# Patient Record
Sex: MALE | Race: WHITE | Employment: OTHER | URBAN - METROPOLITAN AREA
[De-identification: names, ages, dates, MRNs, and addresses within clinical notes are randomized per-mention and may not be internally consistent; named-entity substitution may affect disease eponyms.]

---

## 2017-02-27 ENCOUNTER — OFFICE VISIT (OUTPATIENT)
Dept: OPHTHALMOLOGY | Facility: CLINIC | Age: 67
End: 2017-02-27
Attending: OPHTHALMOLOGY
Payer: COMMERCIAL

## 2017-02-27 DIAGNOSIS — H52.4 PRESBYOPIA: ICD-10-CM

## 2017-02-27 DIAGNOSIS — H52.13 MYOPIC ASTIGMATISM OF BOTH EYES: ICD-10-CM

## 2017-02-27 DIAGNOSIS — H52.203 MYOPIC ASTIGMATISM OF BOTH EYES: ICD-10-CM

## 2017-02-27 DIAGNOSIS — G90.2 HORNER SYNDROME: ICD-10-CM

## 2017-02-27 DIAGNOSIS — H26.9 CATARACTS, BILATERAL: Primary | ICD-10-CM

## 2017-02-27 PROCEDURE — 99214 OFFICE O/P EST MOD 30 MIN: CPT | Mod: ZF

## 2017-02-27 ASSESSMENT — CONF VISUAL FIELD
OD_NORMAL: 1
OS_NORMAL: 1
METHOD: COUNTING FINGERS

## 2017-02-27 ASSESSMENT — EXTERNAL EXAM - LEFT EYE: OS_EXAM: NORMAL

## 2017-02-27 ASSESSMENT — VISUAL ACUITY
OD_CC: 20/20
METHOD: SNELLEN - LINEAR
OD_CC+: -2
OS_CC: 20/20
CORRECTION_TYPE: GLASSES

## 2017-02-27 ASSESSMENT — TONOMETRY
IOP_METHOD: TONOPEN
OD_IOP_MMHG: 16
OS_IOP_MMHG: 14

## 2017-02-27 ASSESSMENT — SLIT LAMP EXAM - LIDS
COMMENTS: UPPER LID PTOSIS
COMMENTS: NORMAL

## 2017-02-27 ASSESSMENT — REFRACTION_WEARINGRX
OS_AXIS: 115
OD_SPHERE: -2.50
OS_SPHERE: -2.00
SPECS_TYPE: SVL
OS_CYLINDER: +0.25
OD_AXIS: 070
OD_CYLINDER: +0.50

## 2017-02-27 ASSESSMENT — CUP TO DISC RATIO
OD_RATIO: 0.5
OS_RATIO: 0.5

## 2017-02-27 ASSESSMENT — EXTERNAL EXAM - RIGHT EYE: OD_EXAM: NORMAL

## 2017-02-27 NOTE — NURSING NOTE
Chief Complaints and History of Present Illnesses   Patient presents with     COMPREHENSIVE EYE EXAM     routine eye exam, Carly syndrome       HPI    Symptoms:     (Comment: Vision is fine BE.)   Floaters (Comment: sometimes LE for last 2 years, unchanged)   No flashes   Glare   Photophobia         Do you have eye pain now?:  No      Comments:  routine eye exam, Carly syndrome. Eye fatigue left eye.    Harinder QUEZADA  12:37 PM02/27/2017

## 2017-02-27 NOTE — MR AVS SNAPSHOT
After Visit Summary   2/27/2017    Ronald Pearson    MRN: [id number]           Patient Information     Date Of Birth          1950        Visit Information        Provider Department      2/27/2017 12:30 PM Kemi Mares MD [facility]        Today's Diagnoses     Cataracts, bilateral    -  1    Carly syndrome        Myopic astigmatism of both eyes        Presbyopia           Follow-ups after your visit        Follow-up notes from your care team     Return in about 1 year (around 2/27/2018).      Who to contact     Please call your clinic at [phone] to:    Ask questions about your health    Make or cancel appointments    Discuss your medicines    Learn about your test results    Speak to your doctor   If you have compliments or concerns about an experience at your clinic, or if you wish to file a complaint, please contact [facility] Patient Relations at [phone] or email us at [email or web address]         Additional Information About Your Visit        MyChart Information     [employer] gives you secure access to your electronic health record. If you see a primary care provider, you can also send messages to your care team and make appointments. If you have questions, please call your primary care clinic.  If you do not have a primary care provider, please call [phone] and they will assist you.      [employer] is an electronic gateway that provides easy, online access to your medical records. With [employer], you can request a clinic appointment, read your test results, renew a prescription or communicate with your care team.     To access your existing account, please contact your [facility] or call [phone] for assistance.        Care EveryWhere ID     This is your Care EveryWhere ID. This could be used by other organizations to access your [place] medical records  [id number]         Blood Pressure from Last 3  Encounters:   07/30/15 150/87   05/27/15 120/78   02/25/15 118/86    Weight from Last 3 Encounters:   07/30/15 74.8 kg (165 lb)   05/27/15 75.3 kg (166 lb)   02/25/15 76.2 kg (168 lb)              Today, you had the following     No orders found for display       Primary Care Provider Office Phone # Fax #    Kerri Braswell TOBIAS Michael [phone]       [facility] [address] [address]        Thank you!     Thank you for choosing EYE CLINIC  for your care. Our goal is always to provide you with excellent care. Hearing back from our patients is one way we can continue to improve our services. Please take a few minutes to complete the written survey that you may receive in the mail after your visit with us. Thank you!             Your Updated Medication List - Protect others around you: Learn how to safely use, store and throw away your medicines at www.disposemymeds.org.          This list is accurate as of: 2/27/17 12:58 PM.  Always use your most recent med list.                   Brand Name Dispense Instructions for use    terbinafine 1 % cream    lamISIL     Apply topically 2 times daily

## 2017-02-27 NOTE — PROGRESS NOTES
HPI  Ronald Pearson is a 66 year old male here for full eye exam. He feels his vision is overall good and stable with current glasses. He is noting some mild increase in glare in his left eye when there is side or back-lighting that is bright. No pain, redness, or discharge. No flashes/floaters.     who has found to have a left internal carotid artery dissection in November of 2014. He states he was diagnosed with mini-strokes related to the dissection, but has no sequelae from these. He is now on anticoagulation, and he states the size of his artery has returned to normal. He has persistent left eyelid droopiness and a small pupil on the left. He notices some problems with glare in the left eye, but overall the pupil and the lid are not bothersome for him.    Assessment & Plan    (H26.9) Cataracts, bilateral  (primary encounter diagnosis)  Comment: May be some early visual significance with glare symptoms, but vision 20/20 OD and OS today.  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 Etiology already known - secondary to his left internal carotid dissection in November of 2014. He was diagnosed with mini-strokes related to the dissection, but has no sequelae from these. He is now on anticoagulation, and per the patient, the size of his artery has returned to normal.  Plan: Discussed that these findings will likely persist over time. If the ptosis becomes bothersome, can consider ptosis repair surgery. He is currently not bothered, so will observe.    (H52.203) Myopic astigmatism of both eyes  (H52.4) Presbyopia  Comment: Good vision with current glasses, Takes glasses off to read  Plan: Observe     -----------------------------------------------------------------------------------    Patient disposition:   Return to clinic in 1 year or sooner as needed.    Teaching statement:  Complete documentation of  historical and exam elements from today's encounter can be found in the full encounter summary report (not reduplicated in this progress note). I personally obtained the chief complaint(s) and history of present illness.  I confirmed and edited as necessary the review of systems, past medical/surgical history, family history, social history, and examination findings as documented by others; and I examined the patient myself. I personally reviewed the relevant tests, images, and reports as documented above.     I formulated and edited as necessary the assessment and plan and discussed the findings and management plan with the patient and family.      Kemi Mares MD  Comprehensive Ophthalmology & Ocular Pathology  Department of Ophthalmology and Visual Neurosciences  [email or web address]  Pager [phone]

## 2017-08-03 ENCOUNTER — MEDICAL CORRESPONDENCE (OUTPATIENT)
Dept: HEALTH INFORMATION MANAGEMENT | Facility: CLINIC | Age: 67
End: 2017-08-03

## 2017-08-03 ENCOUNTER — TRANSFERRED RECORDS (OUTPATIENT)
Dept: HEALTH INFORMATION MANAGEMENT | Facility: CLINIC | Age: 67
End: 2017-08-03

## 2017-08-08 ENCOUNTER — PRE VISIT (OUTPATIENT)
Dept: GASTROENTEROLOGY | Facility: CLINIC | Age: 67
End: 2017-08-08

## 2017-08-08 NOTE — TELEPHONE ENCOUNTER
1.  Date/reason for appt: 8/17/17 8AM - Dysphagia   2.  Referring provider: Dr. Michael  3.  Call to patient (Yes / No - short description): no, pt is referred  4.  Previous care at / records requested from: [facility] -- Need to fax request for records when closer to appt date

## 2017-08-14 ASSESSMENT — ENCOUNTER SYMPTOMS
HEARTBURN: 1
TROUBLE SWALLOWING: 1
BLOATING: 1

## 2017-08-17 ENCOUNTER — OFFICE VISIT (OUTPATIENT)
Dept: GASTROENTEROLOGY | Facility: CLINIC | Age: 67
End: 2017-08-17

## 2017-08-17 VITALS
HEART RATE: 73 BPM | DIASTOLIC BLOOD PRESSURE: 76 MMHG | SYSTOLIC BLOOD PRESSURE: 128 MMHG | OXYGEN SATURATION: 98 % | HEIGHT: 69 IN | BODY MASS INDEX: 25.77 KG/M2 | WEIGHT: 174 LBS

## 2017-08-17 DIAGNOSIS — K21.9 GASTROESOPHAGEAL REFLUX DISEASE, ESOPHAGITIS PRESENCE NOT SPECIFIED: Primary | ICD-10-CM

## 2017-08-17 DIAGNOSIS — I77.71 CAROTID ARTERY DISSECTION (H): ICD-10-CM

## 2017-08-17 DIAGNOSIS — R13.19 ESOPHAGEAL DYSPHAGIA: ICD-10-CM

## 2017-08-17 ASSESSMENT — PAIN SCALES - GENERAL: PAINLEVEL: MILD PAIN (2)

## 2017-08-17 NOTE — NURSING NOTE
"Chief Complaint   Patient presents with     Consult     Dysphagia       Vitals:    08/17/17 0757   BP: 128/76   Pulse: 73   SpO2: 98%   Weight: 78.9 kg (174 lb)   Height: 1.753 m (5' 9\")       Body mass index is 25.7 kg/(m^2).                          "

## 2017-08-17 NOTE — MR AVS SNAPSHOT
After Visit Summary   8/17/2017    Ronald Pearson    MRN: [id number]           Patient Information     Date Of Birth          1950        Visit Information        Provider Department      8/17/2017 8:00 AM Pia Vazquez, APRN CNP [facility]        Today's Diagnoses     Gastroesophageal reflux disease, esophagitis presence not specified    -  1    Esophageal dysphagia        Carotid artery dissection (H)          Care Instructions    1. Continue the ranitidine (Zantac)  Two times daily     2. Follow lifestyle precautions against acid reflux (GERD)    Do not overeat.   Avoid meals and snacks within three or four hours of lying down.   Avoid alcohol and mint. Decrease or quit smoking.   Do not drink carbonation - it IS acid. Decrease or quit caffeine.   If you have nighttime symptoms, elevate the head of the bed    first 3 inches, then 6 to 8 inches.    A foam wedge from the hip may be helpful, if a person lies on their back.    Pillows do NOT work. The entire back must be straight.   Lose weight if you are overweight.    Even ten pounds weight loss can make a difference.  Some people also have specific triggers: tomato, spice, chocolate, citrus/orange juice.    3. Upper GI endoscopy (EGD) at the [facility] ([facility])   [facility]   [address] [address]  [address] - on the north side, in the first driveway after the [address], building on left.    UPPER ENDOSCOPY (also known as EGD- esophageogastroduodenoscopy)    If you do not receive a call to schedule this within two business days, please call Endoscopy .       You will need a  to bring you home from the exam.  You may not take a cab home unless you have an adult with you.    If you have diabetes or are on blood thinners: talk with your doctor at least one week before the exam.  They may want you to change your medicine before  "the test.    This exam looks at the lining of your esophagus, stomach and duodenum (first part of the small intestine).    Do not eat of drink anything for 6-8 hours before your exam.    During this procedure, the doctor will help you to swallow a flexible tube called an endoscope. This endoscope has a small camera that lets the doctor see inside your body. The exam last from 10-20 minutes.    A small IV will be placed in your hand or arm, and medicine will be given to you through this IV to help you relax and reduce pain. They will spray your throat with numbing medicine and ask you to swallow to assist the doctor in passing the tube into your stomach. Also, biopsies may be taken to test in the lab and pictures may be taken of your esophagus, stomach, and duodenum.    You will rest in the recovery room for 30-60 minutes following the exam. Your throat may be numb for a short while and may be sore for a few days.     4. Re loose stool  ? Lactose intolerance? Dairy free two weeks, then add it back in.  Alcohol ? Alcohol free two weeks, add it back in.    For better stool form  Soluble fiber sops up water and gives soft stool and formed stool.    This also supports healthy gut bacteria.    Is a prebiotic that supports the \"pro-biotics\"  Psyllium (Metamucil) is most natural.   Powder - mix and drink immediately, -  or use the capsules or tablets.  or  Wheat dextrin (Benefiber) which has no taste or texture.   Available as powder, capsule, chewable.  When in doubt, return to powder.  If using the fiber discussed in clinic, start as discussed in clinic.  Or: Take the dose on the label.  Increase gradually to 6 two times daily as needed   If it causes distress, start at half the dose and work up to full dose.  If you have no improvement after two weeks, increase the dose and be sure to use it twice daily.  You may feel bloat at the beginning.   Improvement comes gradually.  Continue this regularly for a couple months " before deciding whether it is helpful for you.    Return to GI Clinic to discuss results. Cancel if not needed.     [facility]   For appointments call Suzanne, [phone]  Coordinator  [phone] Teresa or call -  GI Nurse Triage  [phone] for Medical Questions, # 3  Fax results to                       Follow-ups after your visit        Additional Services     GASTROENTEROLOGY ADULT REF PROCEDURE ONLY       Last Lab Result: Creatinine (mg/dL)       Date                     Value                 01/28/2015               0.88             ----------  Body mass index is 25.7 kg/(m^2).     Needed:  No  Language:  English    Patient will be contacted to schedule procedure.     Please be aware that coverage of these services is subject to the terms and limitations of your health insurance plan.  Call member services at your health plan with any benefit or coverage questions.  Any procedures must be performed at a [facility] facility OR coordinated by your clinic's referral office.    Please bring the following with you to your appointment:    (1) Any X-Rays, CTs or MRIs which have been performed.  Contact the facility where they were done to arrange for  prior to your scheduled appointment.    (2) List of current medications   (3) This referral request   (4) Any documents/labs given to you for this referral                  Follow-up notes from your care team     Return in about 2 months (around 10/17/2017).      Who to contact     Please call your clinic at [phone] to:    Ask questions about your health    Make or cancel appointments    Discuss your medicines    Learn about your test results    Speak to your doctor   If you have compliments or concerns about an experience at your clinic, or if you wish to file a complaint, please contact [facility] Patient Relations at [phone] or email us at [email or web address]    "      Additional Information About Your Visit        [employer] Information     [employer] gives you secure access to your electronic health record. If you see a primary care provider, you can also send messages to your care team and make appointments. If you have questions, please call your primary care clinic.  If you do not have a primary care provider, please call [phone] and they will assist you.      [employer] ([employer]) is an electronic gateway that provides easy, online access to your medical records. With [employer]), you can request a clinic appointment, read your test results, renew a prescription or communicate with your care team.     To access your existing account, please contact your [facility] or call [phone] for assistance.        Care EveryWhere ID     This is your Care EveryWhere ID. This could be used by other organizations to access your [place] medical records  [id number]        Your Vitals Were     Pulse Height Pulse Oximetry BMI (Body Mass Index)          73 1.753 m (5' 9\") 98% 25.7 kg/m2         Blood Pressure from Last 3 Encounters:   08/17/17 128/76   07/30/15 150/87   05/27/15 120/78    Weight from Last 3 Encounters:   08/17/17 78.9 kg (174 lb)   07/30/15 74.8 kg (165 lb)   05/27/15 75.3 kg (166 lb)              We Performed the Following     GASTROENTEROLOGY ADULT REF PROCEDURE ONLY        Primary Care Provider Office Phone # Fax #    Kerri Michael, TOBIAS [phone]       [facility] CLINIC [address] [address]        Equal Access to Services     DAHIANA SIMON AH: Hadii aad ku hadasho Soomaali, waaxda luqadaha, qaybta kaalmada adeegyada, waxay alicia chowdary. So [facility] [phone].    ATENCIÓN: Si habla español, tiene a jeffery disposición servicios gratuitos de asistencia lingüística. Llame al [phone].    We comply with applicable federal civil rights laws and [place] laws. We do not discriminate on the basis " of race, color, national origin, age, disability sex, sexual orientation or gender identity.            Thank you!     Thank you for choosing [facility] IBD  for your care. Our goal is always to provide you with excellent care. Hearing back from our patients is one way we can continue to improve our services. Please take a few minutes to complete the written survey that you may receive in the mail after your visit with us. Thank you!             Your Updated Medication List - Protect others around you: Learn how to safely use, store and throw away your medicines at www.disposemymeds.org.          This list is accurate as of: 8/17/17  8:52 AM.  Always use your most recent med list.                   Brand Name Dispense Instructions for use Diagnosis    ranitidine 150 MG tablet    ZANTAC          terbinafine 1 % cream    lamISIL     Apply topically 2 times daily

## 2017-08-17 NOTE — LETTER
"8/17/2017       RE: Ronald Pearson  [address] [facility] [address]     Dear Colleague,    Thank you for referring your patient, Ronald Pearson, to the [facility] at [facility]. Please see a copy of my visit note below.    CHIEF COMPLAINT:  Esophageal dysphagia.      REFERRING PROVIDER:  Kerri Michael CNP.      HISTORY OF PRESENT ILLNESS:  Ronald is a 67-year-old man who, in the last few months, has developed esophageal dysphagia with perception of food slowly going down the esophagus.  He asha with that by eating more slowly and drinking water between bites and still feels it goes slowly.  He has not had full impaction, need to bring food up, or odynophagia.  He has a distant past history of \"noisy swallowing in childhood according to his mother.\"  He has seldom experienced heartburn and taken Tums infrequently.  He has not had past endoscopy or regular use of PPI or H2 blocker.  He began H2 blocker Ranitidine 150 mg twice daily 10 days ago and does not yet note changes.      Ronald has no history of asthma, significant postnasal drip, oral allergy syndrome.  He drinks 1-3 beers each evening.  He has never awakened in the night with choking.  He does not have cough induced drinking liquids.      Ronald has loose stool 1-3 times each morning.  About once a week it is more like diarrhea and he will take a single Pepto-Bismol.  It does not seem especially gassy.  He occasionally has a left lower quadrant gassy feeling, mild discomfort resolved after passing gas or having bowel movements.  He does not have any other abdominal complaints.  Appetite is good.  Energy and appetite stable, no weight loss.      MEDICAL HISTORY:  Reviewed and updated.  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  Initially had diagnosis of " migraine.  Has not had recurrence of that.  Was on clopidogrel about 1 year but then had normal imaging of the internal carotid  and the clopidogrel was stopped. Hyperlipidemia for which he received statin for a while and that was stopped.  Mild rosacea.      SURGICAL HISTORY:  Hernia repair, colonoscopy  with sedation, a few sigmoid diverticula, otherwise normal.  Repeat in 10 years.      FAMILY HISTORY:  Mother  of Parkinsonism.  Father with hypertension, had coronary artery bypass, DM type 2, thyroid disease of hypertrophy for which he had thyroidectomy, no further information known.  No known GI disease, GI cancer, other possible autoimmune disease.      SOCIAL HISTORY:  Ronald is a former smoker, quit in .  Alcohol as beer, 1-3 daily.  Denies drugs.  He plays trombone, occasionally tuba.      REVIEW OF SYSTEMS:  Positive for heartburn, some bloat, hemorrhoids, dysphagia.  Questionnaire reviewed.      OBJECTIVE:  Vital signs reviewed.  Note BMI at 25.7.  Pain in chest mild at 2.  It does not change with exercise, seems likely related to acid reflux.  No recent Cardiology studies found.  A single patient visit note from Primary Care seen from late July.   GENERAL:  Clean, neatly groomed, casually dressed man who shows no distress.   HEENT:  Eyes are clear.  Breathing is calm and grossly normal.  Submandibular nodes are palpable, not enlarged by size criteria, equal.  There is no thyromegaly or nodularity.   BACK:  Nontender to palpation and percussion.   ABDOMEN:  With no unusual tympany, only at the epigastrium at this time.  It is nontender.  Liver margin is palpable at the right, smooth.  There are no other masses or organomegaly appreciated.   EXTREMITIES:  Without edema, cyanosis, clubbing.  Radial pulses are strong and equal and regular.  Left knee DTR is strong with good return, right not obtained.      ASSESSMENT:  A 67-year-old man with esophageal dysphagia, no symptoms of oropharyngeal  dysphagia, only infrequent actual sensation of GERD.  He has a past history of smoking, ongoing history of alcohol intake.  He also plays trombone, which can exacerbate acid reflux though he does not perceive it at that time.  Likely unrelated, he has loose stool 1-3 each morning.  This may relate to dairy ingestion, alcohol ingestion or other.      PLAN:   1.  Continue the Ranitidine 150 mg twice daily.   2.  Upper GI endoscopy with sedation.   3.  Two weeks dairy-free, then add it back in, 2 weeks alcohol free, then add it back in.   4.  Addition of soluable fiber for improved stool form.   5.  Return to GI Clinic for discussion.  Cancel if not needed.      We discussed the assessment and the plan.  We discussed his low possibility of celiac disease and deferred any testing.  Recommendation was to cease daily alcohol and have it perhaps once or twice weekly instead.  Also on exam, he appears to have mild rosacea but anicteric, mild eye changes raises question of higher levels of alcohol, at least at times.      We discussed the assessment and plan.  There were no barriers found to learning.      Total visit 35 minutes with counseling time 20 minutes.      KRISTINE ALEX CNP       D: 2017 09:41   T: 2017 10:14   MT: JASMINA      Name:     KATHERIN TAMAYO   MRN:      [id number]        Account:      [id number]   :      1950           Service Date: 2017      Document: [id number]

## 2017-08-17 NOTE — PATIENT INSTRUCTIONS
1. Continue the ranitidine (Zantac)  Two times daily     2. Follow lifestyle precautions against acid reflux (GERD)    Do not overeat.   Avoid meals and snacks within three or four hours of lying down.   Avoid alcohol and mint. Decrease or quit smoking.   Do not drink carbonation - it IS acid. Decrease or quit caffeine.   If you have nighttime symptoms, elevate the head of the bed    first 3 inches, then 6 to 8 inches.    A foam wedge from the hip may be helpful, if a person lies on their back.    Pillows do NOT work. The entire back must be straight.   Lose weight if you are overweight.    Even ten pounds weight loss can make a difference.  Some people also have specific triggers: tomato, spice, chocolate, citrus/orange juice.    3. Upper GI endoscopy (EGD) at the [facility] ([facility])   [facility]   [address] [place] [address] [address] [place] [address]  1 [address] west of [facility] [address] - on the north side, in the first driveway after the [address]age [address], building on left.    UPPER ENDOSCOPY (also known as EGD- esophageogastroduodenoscopy)    If you do not receive a call to schedule this within two business days, please call Endoscopy .       You will need a  to bring you home from the exam.  You may not take a cab home unless you have an adult with you.    If you have diabetes or are on blood thinners: talk with your doctor at least one week before the exam.  They may want you to change your medicine before the test.    This exam looks at the lining of your esophagus, stomach and duodenum (first part of the small intestine).    Do not eat of drink anything for 6-8 hours before your exam.    During this procedure, the doctor will help you to swallow a flexible tube called an endoscope. This endoscope has a small camera that lets the doctor see inside your body. The exam last from 10-20 minutes.    A small IV will be placed in your hand or arm, and medicine will be given to you  "through this IV to help you relax and reduce pain. They will spray your throat with numbing medicine and ask you to swallow to assist the doctor in passing the tube into your stomach. Also, biopsies may be taken to test in the lab and pictures may be taken of your esophagus, stomach, and duodenum.    You will rest in the recovery room for 30-60 minutes following the exam. Your throat may be numb for a short while and may be sore for a few days.     4. Re loose stool  ? Lactose intolerance? Dairy free two weeks, then add it back in.  Alcohol ? Alcohol free two weeks, add it back in.    For better stool form  Soluble fiber sops up water and gives soft stool and formed stool.    This also supports healthy gut bacteria.    Is a prebiotic that supports the \"pro-biotics\"  Psyllium (Metamucil) is most natural.   Powder - mix and drink immediately, -  or use the capsules or tablets.  or  Wheat dextrin (Benefiber) which has no taste or texture.   Available as powder, capsule, chewable.  When in doubt, return to powder.  If using the fiber discussed in clinic, start as discussed in clinic.  Or: Take the dose on the label.  Increase gradually to 6 two times daily as needed   If it causes distress, start at half the dose and work up to full dose.  If you have no improvement after two weeks, increase the dose and be sure to use it twice daily.  You may feel bloat at the beginning.   Improvement comes gradually.  Continue this regularly for a couple months before deciding whether it is helpful for you.    Return to GI Clinic to discuss results. Cancel if not needed.     Pia Vazquez, [facility]   For appointments call Suzanne, [phone]  Coordinator  [phone] Teresa or call -  GI Nurse Triage  [phone] for Medical Questions, # 3  Fax results to               "

## 2017-08-17 NOTE — PROGRESS NOTES
"CHIEF COMPLAINT:  Esophageal dysphagia.      REFERRING PROVIDER:  Kerri Michael CNP.      HISTORY OF PRESENT ILLNESS:  Ronald is a 67-year-old man who, in the last few months, has developed esophageal dysphagia with perception of food slowly going down the esophagus.  He asha with that by eating more slowly and drinking water between bites and still feels it goes slowly.  He has not had full impaction, need to bring food up, or odynophagia.  He has a distant past history of \"noisy swallowing in childhood according to his mother.\"  He has seldom experienced heartburn and taken Tums infrequently.  He has not had past endoscopy or regular use of PPI or H2 blocker.  He began H2 blocker Ranitidine 150 mg twice daily 10 days ago and does not yet note changes.      Ronald has no history of asthma, significant postnasal drip, oral allergy syndrome.  He drinks 1-3 beers each evening.  He has never awakened in the night with choking.  He does not have cough induced drinking liquids.      Ronald has loose stool 1-3 times each morning.  About once a week it is more like diarrhea and he will take a single Pepto-Bismol.  It does not seem especially gassy.  He occasionally has a left lower quadrant gassy feeling, mild discomfort resolved after passing gas or having bowel movements.  He does not have any other abdominal complaints.  Appetite is good.  Energy and appetite stable, no weight loss.      MEDICAL HISTORY:  Reviewed and updated.  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  Initially had diagnosis of migraine.  Has not had recurrence of that.  Was on clopidogrel about 1 year but then had normal imaging of the internal carotid 2015 and the clopidogrel was stopped. Hyperlipidemia for which he received statin for a while and that was stopped.  Mild rosacea.      SURGICAL HISTORY:  Hernia repair, " colonoscopy  with sedation, a few sigmoid diverticula, otherwise normal.  Repeat in 10 years.      FAMILY HISTORY:  Mother  of Parkinsonism.  Father with hypertension, had coronary artery bypass, DM type 2, thyroid disease of hypertrophy for which he had thyroidectomy, no further information known.  No known GI disease, GI cancer, other possible autoimmune disease.      SOCIAL HISTORY:  Ronald is a former smoker, quit in .  Alcohol as beer, 1-3 daily.  Denies drugs.  He plays trombone, occasionally tuba.      REVIEW OF SYSTEMS:  Positive for heartburn, some bloat, hemorrhoids, dysphagia.  Questionnaire reviewed.      OBJECTIVE:  Vital signs reviewed.  Note BMI at 25.7.  Pain in chest mild at 2.  It does not change with exercise, seems likely related to acid reflux.  No recent Cardiology studies found.  A single patient visit note from Primary Care seen from late July.   GENERAL:  Clean, neatly groomed, casually dressed man who shows no distress.   HEENT:  Eyes are clear.  Breathing is calm and grossly normal.  Submandibular nodes are palpable, not enlarged by size criteria, equal.  There is no thyromegaly or nodularity.   BACK:  Nontender to palpation and percussion.   ABDOMEN:  With no unusual tympany, only at the epigastrium at this time.  It is nontender.  Liver margin is palpable at the right, smooth.  There are no other masses or organomegaly appreciated.   EXTREMITIES:  Without edema, cyanosis, clubbing.  Radial pulses are strong and equal and regular.  Left knee DTR is strong with good return, right not obtained.      ASSESSMENT:  A 67-year-old man with esophageal dysphagia, no symptoms of oropharyngeal dysphagia, only infrequent actual sensation of GERD.  He has a past history of smoking, ongoing history of alcohol intake.  He also plays trombone, which can exacerbate acid reflux though he does not perceive it at that time.  Likely unrelated, he has loose stool 1-3 each morning.  This may relate  to dairy ingestion, alcohol ingestion or other.      PLAN:   1.  Continue the Ranitidine 150 mg twice daily.   2.  Upper GI endoscopy with sedation.   3.  Two weeks dairy-free, then add it back in, 2 weeks alcohol free, then add it back in.   4.  Addition of soluable fiber for improved stool form.   5.  Return to GI Clinic for discussion.  Cancel if not needed.      We discussed the assessment and the plan.  We discussed his low possibility of celiac disease and deferred any testing.  Recommendation was to cease daily alcohol and have it perhaps once or twice weekly instead.  Also on exam, he appears to have mild rosacea but anicteric, mild eye changes raises question of higher levels of alcohol, at least at times.      We discussed the assessment and plan.  There were no barriers found to learning.      Total visit 35 minutes with counseling time 20 minutes.         KRISTINE ALEX CNP             D: 2017 09:41   T: 2017 10:14   MT: JASMINA      Name:     KATHERIN TAMAYO   MRN:      [id number]        Account:      [id number]   :      1950           Service Date: 2017      Document: [id number]

## 2017-08-17 NOTE — PROGRESS NOTES
Answers for HPI/ROS submitted by the patient on 8/14/2017   General Symptoms: No  Skin Symptoms: No  HENT Symptoms: Yes  EYE SYMPTOMS: No  HEART SYMPTOMS: No  LUNG SYMPTOMS: No  INTESTINAL SYMPTOMS: Yes  URINARY SYMPTOMS: Yes  REPRODUCTIVE SYMPTOMS: Yes  SKELETAL SYMPTOMS: No  BLOOD SYMPTOMS: No  NERVOUS SYSTEM SYMPTOMS: No  MENTAL HEALTH SYMPTOMS: No  Trouble swallowing: Yes  Heart burn or indigestion: Yes  Bloating: Yes  Hemorrhoids: Yes  Erectile dysfunction: Yes

## 2017-09-13 ENCOUNTER — TELEPHONE (OUTPATIENT)
Dept: GASTROENTEROLOGY | Facility: OUTPATIENT CENTER | Age: 67
End: 2017-09-13

## 2017-09-20 ENCOUNTER — TRANSFERRED RECORDS (OUTPATIENT)
Dept: HEALTH INFORMATION MANAGEMENT | Facility: CLINIC | Age: 67
End: 2017-09-20

## 2017-09-20 ENCOUNTER — DOCUMENTATION ONLY (OUTPATIENT)
Dept: GASTROENTEROLOGY | Facility: OUTPATIENT CENTER | Age: 67
End: 2017-09-20

## 2017-09-25 LAB — COPATH REPORT: NORMAL

## 2017-09-28 ENCOUNTER — CARE COORDINATION (OUTPATIENT)
Dept: GASTROENTEROLOGY | Facility: CLINIC | Age: 67
End: 2017-09-28

## 2017-09-28 DIAGNOSIS — K22.70 BARRETT'S ESOPHAGUS: Primary | ICD-10-CM

## 2017-09-28 RX ORDER — NICOTINE POLACRILEX 4 MG/1
20 GUM, CHEWING ORAL DAILY
Qty: 90 TABLET | Refills: 3 | Status: SHIPPED | OUTPATIENT
Start: 2017-09-28 | End: 2019-12-10

## 2017-09-28 NOTE — PROGRESS NOTES
Called pt to let him know that Dr. Sharma is swithcing him to omeprazole and to stop ranitidine.  Also order placed to call him to repeat egd in 3 years or sooner if needed. Pt does not have any questions.             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 I wrote him a result note. He needs to switch ranitidine to omeprazole 20 mg daily. Can you help arrange?     Also needs repeat EGD in 3 years     Thanks,   Luis Alberto

## 2018-07-10 ENCOUNTER — MEDICAL CORRESPONDENCE (OUTPATIENT)
Dept: HEALTH INFORMATION MANAGEMENT | Facility: CLINIC | Age: 68
End: 2018-07-10

## 2018-07-10 ENCOUNTER — TRANSFERRED RECORDS (OUTPATIENT)
Dept: HEALTH INFORMATION MANAGEMENT | Facility: CLINIC | Age: 68
End: 2018-07-10

## 2018-07-30 ENCOUNTER — OFFICE VISIT (OUTPATIENT)
Dept: OPHTHALMOLOGY | Facility: CLINIC | Age: 68
End: 2018-07-30
Attending: OPHTHALMOLOGY
Payer: COMMERCIAL

## 2018-07-30 DIAGNOSIS — H52.4 PRESBYOPIA: ICD-10-CM

## 2018-07-30 DIAGNOSIS — H52.13 MYOPIC ASTIGMATISM OF BOTH EYES: ICD-10-CM

## 2018-07-30 DIAGNOSIS — G90.2 HORNER SYNDROME: ICD-10-CM

## 2018-07-30 DIAGNOSIS — H25.13 NUCLEAR SCLEROTIC CATARACT OF BOTH EYES: Primary | ICD-10-CM

## 2018-07-30 DIAGNOSIS — H52.203 MYOPIC ASTIGMATISM OF BOTH EYES: ICD-10-CM

## 2018-07-30 PROCEDURE — 92015 DETERMINE REFRACTIVE STATE: CPT | Mod: ZF

## 2018-07-30 PROCEDURE — G0463 HOSPITAL OUTPT CLINIC VISIT: HCPCS | Mod: ZF

## 2018-07-30 RX ORDER — TAMSULOSIN HYDROCHLORIDE 0.4 MG/1
0.4 CAPSULE ORAL DAILY
COMMUNITY
End: 2021-07-08

## 2018-07-30 ASSESSMENT — VISUAL ACUITY
OS_CC: 20/20
OD_CC: 20/20
METHOD: SNELLEN - LINEAR
CORRECTION_TYPE: GLASSES
OD_CC+: -1

## 2018-07-30 ASSESSMENT — EXTERNAL EXAM - RIGHT EYE: OD_EXAM: NORMAL

## 2018-07-30 ASSESSMENT — EXTERNAL EXAM - LEFT EYE: OS_EXAM: NORMAL

## 2018-07-30 ASSESSMENT — REFRACTION_MANIFEST
OD_CYLINDER: +0.25
OS_CYLINDER: SPHERE
OD_AXIS: 110
OS_SPHERE: -2.00
OD_SPHERE: -2.50

## 2018-07-30 ASSESSMENT — SLIT LAMP EXAM - LIDS
COMMENTS: NORMAL
COMMENTS: UPPER LID PTOSIS

## 2018-07-30 ASSESSMENT — TONOMETRY
OS_IOP_MMHG: 13
IOP_METHOD: TONOPEN
OD_IOP_MMHG: 17

## 2018-07-30 ASSESSMENT — REFRACTION_WEARINGRX
OS_SPHERE: -2.00
OS_AXIS: 115
OS_CYLINDER: +0.25
OD_SPHERE: -2.50
OD_AXIS: 070
OD_CYLINDER: +0.50
SPECS_TYPE: SVL

## 2018-07-30 ASSESSMENT — CUP TO DISC RATIO
OD_RATIO: 0.5
OS_RATIO: 0.5

## 2018-07-30 ASSESSMENT — CONF VISUAL FIELD
OD_NORMAL: 1
OS_NORMAL: 1

## 2018-07-30 NOTE — MR AVS SNAPSHOT
After Visit Summary   7/30/2018    Ronald Pearson    MRN: [id number]           Patient Information     Date Of Birth          1950        Visit Information        Provider Department      7/30/2018 10:00 AM Kemi Mares MD [facility]        Today's Diagnoses     Nuclear sclerotic cataract of both eyes    -  1    Carly syndrome        Myopic astigmatism of both eyes        Presbyopia           Follow-ups after your visit        Follow-up notes from your care team     Return in about 1 year (around 7/30/2019) for BAT testing.      Your next 10 appointments already scheduled     Sep 05, 2018  9:00 AM CDT   (Arrive by 8:45 AM)   New Patient Visit with Lisset Iraheta MD   [facility] ([facility])    [address] [place] [address]  [address] [place] [id number] [phone]              Who to contact     Please call your clinic at [phone] to:    Ask questions about your health    Make or cancel appointments    Discuss your medicines    Learn about your test results    Speak to your doctor            Additional Information About Your Visit        MyChart Information     [facility] gives you secure access to your electronic health record. If you see a primary care provider, you can also send messages to your care team and make appointments. If you have questions, please call your primary care clinic.  If you do not have a primary care provider, please call [phone] and they will assist you.      [facility] is an electronic gateway that provides easy, online access to your medical records. With [facility], you can request a clinic appointment, read your test results, renew a prescription or communicate with your care team.     To access your existing account, please contact your [facility] or call [phone] for assistance.        Care EveryWhere ID     This is your Care EveryWhere ID. This could be used by other organizations  to access your [place] medical records  [id number]         Blood Pressure from Last 3 Encounters:   08/17/17 128/76   07/30/15 150/87   05/27/15 120/78    Weight from Last 3 Encounters:   08/17/17 78.9 kg (174 lb)   07/30/15 74.8 kg (165 lb)   05/27/15 75.3 kg (166 lb)              Today, you had the following     No orders found for display       Primary Care Provider Office Phone # Fax #    Kerri Braswell [facility] Michael [phone]       [facility] [address] [facility] [address]        Equal Access to Services     DAHIANA SIMON : Hadii aad ku hadasho Souriel, waaxda luqadaha, qaybta kaalmada adequangyada, yulia chowdary. So [facility] [phone].    ATENCIÓN: Si habla español, tiene a jeffery disposición servicios gratuitos de asistencia lingüística. [facility] [phone].    We comply with applicable federal civil rights laws and [place] laws. We do not discriminate on the basis of race, color, national origin, age, disability, sex, sexual orientation, or gender identity.            Thank you!     Thank you for choosing EYE CLINIC  for your care. Our goal is always to provide you with excellent care. Hearing back from our patients is one way we can continue to improve our services. Please take a few minutes to complete the written survey that you may receive in the mail after your visit with us. Thank you!             Your Updated Medication List - Protect others around you: Learn how to safely use, store and throw away your medicines at www.disposemymeds.org.          This list is accurate as of 7/30/18 11:31 AM.  Always use your most recent med list.                   Brand Name Dispense Instructions for use Diagnosis    omeprazole 20 MG tablet     90 tablet    Take 1 tablet (20 mg) by mouth daily in am . To be taken at least 30 to 60 minutes prior to eating.    Patterson's esophagus       tamsulosin 0.4 MG capsule    FLOMAX     Take 0.4 mg by mouth daily         terbinafine 1 % cream    lamISIL     Apply topically 2 times daily

## 2018-07-30 NOTE — NURSING NOTE
Chief Complaints and History of Present Illnesses   Patient presents with     Follow Up For     Cataracts, bilateral       HPI    Affected eye(s):  Both   Symptoms:        Duration:  1 year   Frequency:  Constant       Do you have eye pain now?:  No      Comments:  Pt. States that he is doing well.  No change in VA BE.  No flashes or floaters BE.  No c/o comfort BE.  Ariana LEY 10:27 AM July 30, 2018

## 2018-07-30 NOTE — PROGRESS NOTES
HPI  Ronald Pearson is a 68 year old male here for full eye exam. He feels his vision is overall good and stable with current glasses. He is noting some increase in glare in both eyes, but especially the left eye when there is side or back-lighting that is bright. No pain, redness, or discharge. No flashes/floaters.     He has a history of left internal carotid artery dissection in November of 2014. He states he was diagnosed with mini-strokes related to the dissection, but has no sequelae from these. He is now on anticoagulation, and he states the size of his artery has returned to normal. He has persistent left eyelid droopiness and a small pupil on the left.     Assessment & Plan    (H26.9) Cataracts, bilateral  (primary encounter diagnosis)  Comment: May be some early visual significance with glare symptoms, but vision 20/20 OD and OS today.  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 Etiology already known - secondary to his left internal carotid dissection in November of 2014. He was diagnosed with mini-strokes related to the dissection, but has no sequelae from these. He is now on anticoagulation, and per the patient, the size of his artery has returned to normal.  Plan: Discussed that these findings will likely persist over time. If the ptosis becomes bothersome, can consider ptosis repair surgery. He is currently not bothered, so will observe.    (H52.203) Myopic astigmatism of both eyes  (H52.4) Presbyopia  Comment: Good vision with current glasses, Takes glasses off to read  Plan: Observe     -----------------------------------------------------------------------------------    Patient disposition:   Return to clinic in 1 year with BAT testing or sooner as needed.    Teaching statement:  Complete documentation of historical and exam elements from today's encounter can be found in the full  encounter summary report (not reduplicated in this progress note). I personally obtained the chief complaint(s) and history of present illness.  I confirmed and edited as necessary the review of systems, past medical/surgical history, family history, social history, and examination findings as documented by others; and I examined the patient myself. I personally reviewed the relevant tests, images, and reports as documented above.     I formulated and edited as necessary the assessment and plan and discussed the findings and management plan with the patient and family.      Kemi Mares MD  Comprehensive Ophthalmology & Ocular Pathology  Department of Ophthalmology and Visual Neurosciences  [email or web address]  Pager [phone]

## 2018-09-04 NOTE — TELEPHONE ENCOUNTER
FUTURE VISIT INFORMATION:    Date: 9/5/18    Time: 0900    Location:  Cardiology  REFERRAL INFORMATION:    Referring provider:  Kerri Michael NP    Referring providers clinic:  [facility]    Reason for visit/diagnosis:Family hx of heart disease, borderline high BP, appt mario Wiseman at [facility]

## 2018-09-05 ENCOUNTER — OFFICE VISIT (OUTPATIENT)
Dept: CARDIOLOGY | Facility: CLINIC | Age: 68
End: 2018-09-05
Attending: NURSE PRACTITIONER
Payer: COMMERCIAL

## 2018-09-05 ENCOUNTER — PRE VISIT (OUTPATIENT)
Dept: CARDIOLOGY | Facility: CLINIC | Age: 68
End: 2018-09-05

## 2018-09-05 VITALS
WEIGHT: 172.4 LBS | HEART RATE: 72 BPM | DIASTOLIC BLOOD PRESSURE: 85 MMHG | OXYGEN SATURATION: 98 % | HEIGHT: 69 IN | SYSTOLIC BLOOD PRESSURE: 135 MMHG | BODY MASS INDEX: 25.53 KG/M2

## 2018-09-05 DIAGNOSIS — Z86.79 HISTORY OF CAROTID ARTERY DISSECTION: ICD-10-CM

## 2018-09-05 DIAGNOSIS — E78.2 MIXED HYPERLIPIDEMIA: ICD-10-CM

## 2018-09-05 DIAGNOSIS — I10 ESSENTIAL HYPERTENSION: Primary | ICD-10-CM

## 2018-09-05 PROCEDURE — 93010 ELECTROCARDIOGRAM REPORT: CPT | Mod: ZP | Performed by: INTERNAL MEDICINE

## 2018-09-05 PROCEDURE — 93005 ELECTROCARDIOGRAM TRACING: CPT | Mod: ZF

## 2018-09-05 PROCEDURE — 99204 OFFICE O/P NEW MOD 45 MIN: CPT | Mod: 25 | Performed by: INTERNAL MEDICINE

## 2018-09-05 PROCEDURE — G0463 HOSPITAL OUTPT CLINIC VISIT: HCPCS

## 2018-09-05 RX ORDER — ATORVASTATIN CALCIUM 10 MG/1
40 TABLET, FILM COATED ORAL DAILY
Qty: 30 TABLET | Refills: 11 | Status: SHIPPED | OUTPATIENT
Start: 2018-09-05 | End: 2018-09-10

## 2018-09-05 ASSESSMENT — ENCOUNTER SYMPTOMS: MYALGIAS: 1

## 2018-09-05 ASSESSMENT — PAIN SCALES - GENERAL: PAINLEVEL: NO PAIN (0)
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
## 2018-09-05 NOTE — NURSING NOTE
Vitals completed successfully and medication reconciled. EKG done. Ariam Nugent MA  Chief Complaint   Patient presents with     New Patient     67 yo male h/o hyperlipidemia, ICA with underlying HTN present for borderline HTN

## 2018-09-05 NOTE — MR AVS SNAPSHOT
"              After Visit Summary   2018    Ronald Pearson    MRN: [id number]           Patient Information     Date Of Birth          1950        Visit Information        Provider Department      2018 9:00 AM Lisset Iraheta MD [facility]        Today's Diagnoses     Essential hypertension    -  1    Mixed hyperlipidemia        History of carotid artery dissection          Care Instructions    You were seen today in the Cardiovascular Clinic at the [facility].     Cardiology Providers you saw during your visit: Dr. Iraheta     Diagnosis:   Encounter Diagnoses   Name Primary?     Essential hypertension Yes     Mixed hyperlipidemia      History of carotid artery dissection         Results: Discussed with you today EKG/CTA neck & head/Cholesterol from July      Orders:   Orders Placed This Encounter   Procedures     EKG 12-lead, tracing only (Same Day)       Current Medication List  Current Outpatient Prescriptions   Medication Sig Dispense Refill     atorvastatin (LIPITOR) 10 MG tablet Take 4 tablets (40 mg) by mouth daily 30 tablet 11     omeprazole 20 MG tablet Take 1 tablet (20 mg) by mouth daily in am . To be taken at least 30 to 60 minutes prior to eating. 90 tablet 3     tamsulosin (FLOMAX) 0.4 MG capsule Take 0.4 mg by mouth daily       terbinafine (LAMISIL) 1 % cream Apply topically 2 times daily           Medications Discontinued:  There are no discontinued medications.      Recommendations:   1. Follow BP at home  2. Start baby aspirin and cholesterol lowering therapy Atorvastatin 40 mg daily    Follow-up: 1 year with fasting lipid panel plus EKG         Please feel free to call me with any questions or concerns.       Zen López LPN     Questions: [phone].   First press #1 for the [employer] and then press #3 for \"Medical Questions\" to reach us Cardiology Nurses.     [phone].   First press #1 for [employer] and then press #1 "      On Call Cardiologist for after hours or on weekends: [phone]   option #4 and ask to speak to the on-call Cardiologist.          If you need a medication refill please contact your pharmacy.  Please allow 3 business days for your refill to be completed.  ________________________________________________________________________________________________________________________________                Follow-ups after your visit        Additional Services     Follow-Up with Cardiologist                 Future tests that were ordered for you today     Open Future Orders        Priority Expected Expires Ordered    Follow-Up with Cardiologist Routine 9/5/2019 9/6/2019 9/5/2018    EKG 12-lead, tracing only Routine 9/5/2019 9/6/2019 9/5/2018    ALT Routine 9/5/2019 9/6/2019 9/5/2018    AST Routine 9/5/2019 9/6/2019 9/5/2018    Lipid panel reflex to direct LDL Fasting Routine 9/5/2019 9/6/2019 9/5/2018            Who to contact     If you have questions or need follow up information about today's clinic visit or your schedule please contact [facility] directly at [phone].  Normal or non-critical lab and imaging results will be communicated to you by [email or web address], letter or phone within 4 business days after the clinic has received the results. If you do not hear from us within 7 days, please contact the clinic through [email or web address] or phone. If you have a critical or abnormal lab result, we will notify you by phone as soon as possible.  Submit refill requests through [facility] or call your pharmacy and they will forward the refill request to us. Please allow 3 business days for your refill to be completed.          Additional Information About Your Visit        [email or web address] Information     [facility] gives you secure access to your electronic health record. If you see a primary care provider, you can also send messages to your care team and make appointments. If you have questions, please call your primary care clinic.  If  "you do not have a primary care provider, please call [phone] and they will assist you.        Care EveryWhere ID     This is your Care EveryWhere ID. This could be used by other organizations to access your [place] medical records  [id number]        Your Vitals Were     Pulse Height Pulse Oximetry BMI (Body Mass Index)          72 1.753 m (5' 9\") 98% 25.46 kg/m2         Blood Pressure from Last 3 Encounters:   09/05/18 135/85   08/17/17 128/76   07/30/15 150/87    Weight from Last 3 Encounters:   09/05/18 78.2 kg (172 lb 6.4 oz)   08/17/17 78.9 kg (174 lb)   07/30/15 74.8 kg (165 lb)              We Performed the Following     EKG 12-lead, tracing only (Same Day)          Today's Medication Changes          These changes are accurate as of 9/5/18  9:45 AM.  If you have any questions, ask your nurse or doctor.               Start taking these medicines.        Dose/Directions    atorvastatin 10 MG tablet   Commonly known as:  LIPITOR   Used for:  Essential hypertension, Mixed hyperlipidemia, History of carotid artery dissection   Started by:  Lisset Iraheta MD        Dose:  40 mg   Take 4 tablets (40 mg) by mouth daily   Quantity:  30 tablet   Refills:  11            Where to get your medicines      These medications were sent to [place] [address] [place] [facility] [address] [address]     Phone:  [phone]     atorvastatin 10 MG tablet                Primary Care Provider Office Phone # Fax #    Kerri Michaelle Michael [phone]       [facility] [address] [facility] [address]        Equal Access to Services     NEELAM [facility] Center[facility]AN AH: Hadcarli [place], washrutida luqadaha, qaybta kaalmayulia shen. So [facility] [phone].    ATENCIÓN: Si habla español, tiene a ejffery disposición servicios gratuitos de asistencia lingüística. Llame al [phone].    We comply with applicable federal " civil rights laws and [place] laws. We do not discriminate on the basis of race, color, national origin, age, disability, sex, sexual orientation, or gender identity.            Thank you!     Thank you for choosing [facility]  for your care. Our goal is always to provide you with excellent care. Hearing back from our patients is one way we can continue to improve our services. Please take a few minutes to complete the written survey that you may receive in the mail after your visit with us. Thank you!             Your Updated Medication List - Protect others around you: Learn how to safely use, store and throw away your medicines at www.disposemymeds.org.          This list is accurate as of 9/5/18  9:45 AM.  Always use your most recent med list.                   Brand Name Dispense Instructions for use Diagnosis    atorvastatin 10 MG tablet    LIPITOR    30 tablet    Take 4 tablets (40 mg) by mouth daily    Essential hypertension, Mixed hyperlipidemia, History of carotid artery dissection       omeprazole 20 MG tablet     90 tablet    Take 1 tablet (20 mg) by mouth daily in am . To be taken at least 30 to 60 minutes prior to eating.    Patterson's esophagus       tamsulosin 0.4 MG capsule    FLOMAX     Take 0.4 mg by mouth daily        terbinafine 1 % cream    lamISIL     Apply topically 2 times daily

## 2018-09-05 NOTE — PATIENT INSTRUCTIONS
"You were seen today in the Cardiovascular Clinic at the [facility].     Cardiology Providers you saw during your visit: Dr. Iraheta     Diagnosis:   Encounter Diagnoses   Name Primary?     Essential hypertension Yes     Mixed hyperlipidemia      History of carotid artery dissection         Results: Discussed with you today EKG/CTA neck & head/Cholesterol from July      Orders:   Orders Placed This Encounter   Procedures     EKG 12-lead, tracing only (Same Day)       Current Medication List  Current Outpatient Prescriptions   Medication Sig Dispense Refill     atorvastatin (LIPITOR) 10 MG tablet Take 4 tablets (40 mg) by mouth daily 30 tablet 11     omeprazole 20 MG tablet Take 1 tablet (20 mg) by mouth daily in am . To be taken at least 30 to 60 minutes prior to eating. 90 tablet 3     tamsulosin (FLOMAX) 0.4 MG capsule Take 0.4 mg by mouth daily       terbinafine (LAMISIL) 1 % cream Apply topically 2 times daily           Medications Discontinued:  There are no discontinued medications.      Recommendations:   1. Follow BP at home  2. Start baby aspirin and cholesterol lowering therapy Atorvastatin 40 mg daily    Follow-up: 1 year with fasting lipid panel plus EKG         Please feel free to call me with any questions or concerns.       Zen López LPN     Questions: [phone].   First press #1 for the [employer] and then press #3 for \"Medical Questions\" to reach us Cardiology Nurses.     [phone].   First press #1 for the [employer] and then press #1      On Call Cardiologist for after hours or on weekends: [phone]   option #4 and ask to speak to the on-call Cardiologist.          If you need a medication refill please contact your pharmacy.  Please allow 3 business days for your refill to be completed.  ________________________________________________________________________________________________________________________________        "

## 2018-09-06 LAB — INTERPRETATION ECG - MUSE: NORMAL

## 2018-09-10 DIAGNOSIS — E78.2 MIXED HYPERLIPIDEMIA: ICD-10-CM

## 2018-09-10 DIAGNOSIS — I10 ESSENTIAL HYPERTENSION: ICD-10-CM

## 2018-09-10 DIAGNOSIS — Z86.79 HISTORY OF CAROTID ARTERY DISSECTION: ICD-10-CM

## 2018-09-10 NOTE — TELEPHONE ENCOUNTER
Pharmacy calling to ask for script clarification for dosage. Pharmacy asking for a dispense quantity of 120 or one 40mg tablet.

## 2018-09-14 ENCOUNTER — CARE COORDINATION (OUTPATIENT)
Dept: CARDIOLOGY | Facility: CLINIC | Age: 68
End: 2018-09-14

## 2018-09-14 NOTE — PROGRESS NOTES
Received refill request to change 10 mg tablets to 40 mg tablets from pharmacy. LM for patient to confirm that he wants a bigger tablet (40 mg) or keep 10 mg tablets and just keep taking 4 of them.

## 2018-09-17 RX ORDER — ATORVASTATIN CALCIUM 40 MG/1
40 TABLET, FILM COATED ORAL DAILY
Qty: 90 TABLET | Refills: 3 | Status: SHIPPED | OUTPATIENT
Start: 2018-09-17 | End: 2021-07-08

## 2018-09-17 NOTE — PROGRESS NOTES
Patient returned call and advised him that an new updated RX for atorvastatin  40 mg -take 1 tablet by mouth daily was sent to his pharmacy as a 90 day supply refill. Patient states understanding and agrees to call with any questions or concerns.

## 2018-09-17 NOTE — TELEPHONE ENCOUNTER
Pharmacy called on 09/10/2018 to get dosage clarification. Changed tablet dosing from 4- 10 mg (40 mg) tablets to 1- 40 mg tablets.

## 2018-09-17 NOTE — PROGRESS NOTES
LM for patient again to clarify and confirm that he would like to change size of medication for atorvastatin. Spoke with pharmacy and he states that there were no notes for dosing clarification. Pharmacy states that RX was filled yesterday dispensed at Atorvastatin 40 mg tablets 30 tablets. Sent updated RX for Atrovastatin 40 mg by mouth daily.

## 2018-11-13 ENCOUNTER — THERAPY VISIT (OUTPATIENT)
Dept: PHYSICAL THERAPY | Facility: CLINIC | Age: 68
End: 2018-11-13
Payer: COMMERCIAL

## 2018-11-13 DIAGNOSIS — M25.511 CHRONIC RIGHT SHOULDER PAIN: ICD-10-CM

## 2018-11-13 DIAGNOSIS — G89.29 CHRONIC RIGHT SHOULDER PAIN: ICD-10-CM

## 2018-11-13 PROCEDURE — G8985 CARRY GOAL STATUS: HCPCS | Mod: GP | Performed by: PHYSICAL THERAPIST

## 2018-11-13 PROCEDURE — 97161 PT EVAL LOW COMPLEX 20 MIN: CPT | Mod: GP | Performed by: PHYSICAL THERAPIST

## 2018-11-13 PROCEDURE — 97110 THERAPEUTIC EXERCISES: CPT | Mod: GP | Performed by: PHYSICAL THERAPIST

## 2018-11-13 PROCEDURE — G8984 CARRY CURRENT STATUS: HCPCS | Mod: GP | Performed by: PHYSICAL THERAPIST

## 2018-11-13 NOTE — LETTER
[facility]  [address]  [facility] [address]  [phone]    2018    Re: Ronald Pearson   :   1950  MRN:  [id number]   REFERRING PHYSICIAN:   Kerri Michael    [facility]    Date of Initial Evaluation:  2018  Visits:  Rxs Used: 1  Reason for Referral:  Chronic right shoulder pain    EVALUATION SUMMARY    [facility]    Subjective:  Ronald Pearson is a 68 year old male.    Patient s chief complaints: Right shoulder pain.    Condition occurred due to no particular event.  Thinks maybe he may have over-stretched his arm in a stretching class potentially.  Date of Onset: 2018  Location of symptoms is Posterior shoulder, lateral deltoid down the upper arm.  Symptoms other than pain include: None   Quality of pain is dull and achy, sometimes throbbing, occasionally sharp and frequency is intermittent.    Pain dependence on time of day is: thinks it might be worse when he is trying to sleep.   Pain rating is: 3/10, 8/10.    Symptoms are exacerbated by: Aches when he is laying down at night.  Painful to play trombone.  Working on his smart phone.  Symptoms are relieved by:  Movement throughout the day.    Progression of symptoms is that symptoms are:  About the same.  Imaging/Special tests include: None   Previous treatments include: None.   Patient reports that general health is: Good.   Pertinent medical history includes:  History of  shoulder 40 years ago.  History of high blood pressure, acid reflux.  See EMR for full details.  Medical allergies includes: Reglan.   Surgical history includes: None.  Current medications include: Occasional Ibuprofen.  Tamsilosen, atorvastatin, omeprazole.  See EMR for full details.  Current occupation is: Musical instrument repair, playing trombone,   Work status is:  Part time  Primary job tasks include: handling instruments and tools, desk work, sits on sofa with his I-phone a lot  Barriers include: None  Red flags include: None      Re: Ronald Pearson   :   1950    Patient's expectations for therapy include: Less pain with daily activities, work, playing trombone  HPI            Objective:  SHOULDER:  Cervical Screen: Some minor tightness in upper traps bilaterally with cervical motion, WNL otherwise.  Negative spurling's.     Repeated cervical retraction: resulted in less pain with right shoulder flexion and abduction.    Shoulder:   PROM L PROM R AROM L AROM R MMT L MMT R   Flex   WNL WNL (end range pain) 5 5 (slight pain in forearm)   Abd   WNL WNL (pain at about 85 and end range) 5 5 (slight pain in shoulder and forearm)   Full Can     5 5   Empty Can     5 5   IR   T9 T9 (slight tightness at end range) 5 5   ER   40 40 5 4 (moderate pain in lateral shoulder)   Ext/IR           Scapulothoraic Rhythm: Slight winging in right shoulder blade  Palpation: Slight tenderness to anterior shoulder near biceps insertion      Special tests:   L R   Impingement     Neer's  +   Hawkin's-Adam  -   Coracoid Impingement  -   Biceps      Speed's  -   Assessment/Plan:    Patient is a 68 year old male with right side shoulder complaints.    Patient has the following significant findings with corresponding treatment plan.                Diagnosis 1:  Right Shoulder Pain, possible posterior R/C impingement and cervical spine referral     Pain -  hot/cold therapy, manual therapy, self management, education, directional preference exercise and home program  Decreased ROM/flexibility - manual therapy and therapeutic exercise  Decreased strength - therapeutic exercise and therapeutic activities  Decreased function - therapeutic activities  Impaired posture - neuro re-education  Therapy Evaluation Codes:   1) History comprised of:   Personal factors that impact the plan of care:       None.    Comorbidity factors that impact the plan of care are:      None.     Medications impacting care: None.  2) Examination of Body Systems comprised of:   Body structures and functions that impact the plan of care:      Cervical spine and Shoulder.   Activity limitations that impact the plan of care are:      Bathing, Dressing, Lifting, Working, Sleeping and Laying down.  3) Clinical presentation characteristics are:   Stable/Uncomplicated.  4) Decision-Making    Low complexity using standardized patient assessment instrument and/or measureable assessment of functional outcome.  Cumulative Therapy Evaluation is: Low complexity.    Previous and current functional limitations:  (See Goal Flow Sheet for this information)    Short term and Long term goals: (See Goal Flow Sheet for this information)     Communication ability:  Patient appears to be able to clearly communicate and understand verbal and written communication and follow directions correctly.  Treatment Explanation - The following has been discussed with the patient:   RX ordered/plan of care  Anticipated outcomes  Possible risks and side effects  This patient would benefit from PT intervention to resume normal activities.   Rehab potential is good.  Frequency:  1 X week, once daily  Duration:  for 6 weeks  Discharge Plan:  Achieve all LTG.  Independent in home treatment program.  Reach maximal therapeutic benefit.        Thank you for your referral.    INQUIRIES  Therapist: Jordyn Rainey, SCS, Cert. MDT  [facility]  [address] [place] [address] [address] [address]  Phone: [phone]  Fax: [phone]

## 2018-11-13 NOTE — PROGRESS NOTES
[facility] Initial Evaluation  Subjective:  Ronald Pearson is a 68 year old male.    Patient s chief complaints: Right shoulder pain.    Condition occurred due to no particular event.  Thinks maybe he may have over-stretched his arm in a stretching class potentially.  Date of Onset: 5/13/2018  Location of symptoms is Posterior shoulder, lateral deltoid down the upper arm.  Symptoms other than pain include: None   Quality of pain is dull and achy, sometimes throbbing, occasionally sharp and frequency is intermittent.    Pain dependence on time of day is: thinks it might be worse when he is trying to sleep.   Pain rating is: 3/10, 8/10.    Symptoms are exacerbated by: Aches when he is laying down at night.  Painful to play trombone.  Working on his smart phone.  Symptoms are relieved by:  Movement throughout the day.    Progression of symptoms is that symptoms are:  About the same.  Imaging/Special tests include: None   Previous treatments include: None.   Patient reports that general health is: Good.   Pertinent medical history includes:  History of  shoulder 40 years ago.  History of high blood pressure, acid reflux.  See EMR for full details.  Medical allergies includes: Reglan.   Surgical history includes: None.  Current medications include: Occasional Ibuprofen.  Tamsilosen, atorvastatin, omeprazole.  See EMR for full details.  Current occupation is: Musical instrument repair, playing trombone,   Work status is: Part time  Primary job tasks include: handling instruments and tools, desk work, sits on sofa with his I-phone a lot  Barriers include: None  Red flags include: None    Patient's expectations for therapy include: Less pain with daily activities, work, playing trombone  HPI                    Objective:  SHOULDER:    Cervical Screen: Some minor tightness in upper traps bilaterally with cervical motion, WNL otherwise.  Negative spurling's.     Repeated  cervical retraction: resulted in less pain with right shoulder flexion and abduction.    Shoulder:   PROM L PROM R AROM L AROM R MMT L MMT R   Flex   WNL WNL (end range pain) 5 5 (slight pain in forearm)   Abd   WNL WNL (pain at about 85 and end range) 5 5 (slight pain in shoulder and forearm)   Full Can     5 5   Empty Can     5 5   IR   T9 T9 (slight tightness at end range) 5 5   ER   40 40 5 4 (moderate pain in lateral shoulder)   Ext/IR           Scapulothoraic Rhythm: Slight winging in right shoulder blade    Palpation: Slight tenderness to anterior shoulder near biceps insertion      Special tests:   L R   Impingement     Neer's  +   Hawkin's-Adam  -   Coracoid Impingement  -   Biceps      Speed's  -     System    Physical Exam    General     ROS    Assessment/Plan:    Patient is a 68 year old male with right side shoulder complaints.    Patient has the following significant findings with corresponding treatment plan.                Diagnosis 1:  Right Shoulder Pain, possible posterior R/C impingement and cervical spine referral     Pain -  hot/cold therapy, manual therapy, self management, education, directional preference exercise and home program  Decreased ROM/flexibility - manual therapy and therapeutic exercise  Decreased strength - therapeutic exercise and therapeutic activities  Decreased function - therapeutic activities  Impaired posture - neuro re-education    Therapy Evaluation Codes:   1) History comprised of:   Personal factors that impact the plan of care:      None.    Comorbidity factors that impact the plan of care are:      None.     Medications impacting care: None.  2) Examination of Body Systems comprised of:   Body structures and functions that impact the plan of care:      Cervical spine and Shoulder.   Activity limitations that impact the plan of care are:      Bathing, Dressing, Lifting, Working, Sleeping and Laying down.  3) Clinical presentation characteristics  are:   Stable/Uncomplicated.  4) Decision-Making    Low complexity using standardized patient assessment instrument and/or measureable assessment of functional outcome.  Cumulative Therapy Evaluation is: Low complexity.    Previous and current functional limitations:  (See Goal Flow Sheet for this information)    Short term and Long term goals: (See Goal Flow Sheet for this information)     Communication ability:  Patient appears to be able to clearly communicate and understand verbal and written communication and follow directions correctly.  Treatment Explanation - The following has been discussed with the patient:   RX ordered/plan of care  Anticipated outcomes  Possible risks and side effects  This patient would benefit from PT intervention to resume normal activities.   Rehab potential is good.    Frequency:  1 X week, once daily  Duration:  for 6 weeks  Discharge Plan:  Achieve all LTG.  Independent in home treatment program.  Reach maximal therapeutic benefit.    Please refer to the daily flowsheet for treatment today, total treatment time and time spent performing 1:1 timed codes.

## 2018-11-29 ENCOUNTER — THERAPY VISIT (OUTPATIENT)
Dept: PHYSICAL THERAPY | Facility: CLINIC | Age: 68
End: 2018-11-29
Payer: COMMERCIAL

## 2018-11-29 DIAGNOSIS — G89.29 CHRONIC RIGHT SHOULDER PAIN: ICD-10-CM

## 2018-11-29 DIAGNOSIS — M25.511 CHRONIC RIGHT SHOULDER PAIN: ICD-10-CM

## 2018-11-29 PROCEDURE — 97112 NEUROMUSCULAR REEDUCATION: CPT | Mod: GP | Performed by: PHYSICAL THERAPIST

## 2018-11-29 PROCEDURE — 97110 THERAPEUTIC EXERCISES: CPT | Mod: GP | Performed by: PHYSICAL THERAPIST

## 2018-11-29 PROCEDURE — 97140 MANUAL THERAPY 1/> REGIONS: CPT | Mod: GP | Performed by: PHYSICAL THERAPIST

## 2018-12-13 ENCOUNTER — THERAPY VISIT (OUTPATIENT)
Dept: PHYSICAL THERAPY | Facility: CLINIC | Age: 68
End: 2018-12-13
Payer: COMMERCIAL

## 2018-12-13 DIAGNOSIS — M25.511 CHRONIC RIGHT SHOULDER PAIN: ICD-10-CM

## 2018-12-13 DIAGNOSIS — G89.29 CHRONIC RIGHT SHOULDER PAIN: ICD-10-CM

## 2018-12-13 PROCEDURE — 97110 THERAPEUTIC EXERCISES: CPT | Mod: GP | Performed by: PHYSICAL THERAPIST

## 2018-12-13 PROCEDURE — 97140 MANUAL THERAPY 1/> REGIONS: CPT | Mod: GP | Performed by: PHYSICAL THERAPIST

## 2018-12-13 PROCEDURE — 97112 NEUROMUSCULAR REEDUCATION: CPT | Mod: GP | Performed by: PHYSICAL THERAPIST

## 2018-12-26 ENCOUNTER — THERAPY VISIT (OUTPATIENT)
Dept: PHYSICAL THERAPY | Facility: CLINIC | Age: 68
End: 2018-12-26
Payer: COMMERCIAL

## 2018-12-26 DIAGNOSIS — G89.29 CHRONIC RIGHT SHOULDER PAIN: ICD-10-CM

## 2018-12-26 DIAGNOSIS — M25.511 CHRONIC RIGHT SHOULDER PAIN: ICD-10-CM

## 2018-12-26 PROCEDURE — 97112 NEUROMUSCULAR REEDUCATION: CPT | Mod: GP | Performed by: PHYSICAL THERAPIST

## 2018-12-26 PROCEDURE — 97110 THERAPEUTIC EXERCISES: CPT | Mod: GP | Performed by: PHYSICAL THERAPIST

## 2018-12-26 PROCEDURE — 97140 MANUAL THERAPY 1/> REGIONS: CPT | Mod: GP | Performed by: PHYSICAL THERAPIST

## 2019-01-16 ENCOUNTER — THERAPY VISIT (OUTPATIENT)
Dept: PHYSICAL THERAPY | Facility: CLINIC | Age: 69
End: 2019-01-16
Payer: COMMERCIAL

## 2019-01-16 DIAGNOSIS — M25.511 CHRONIC RIGHT SHOULDER PAIN: ICD-10-CM

## 2019-01-16 DIAGNOSIS — G89.29 CHRONIC RIGHT SHOULDER PAIN: ICD-10-CM

## 2019-01-16 PROCEDURE — 97112 NEUROMUSCULAR REEDUCATION: CPT | Mod: GP | Performed by: PHYSICAL THERAPIST

## 2019-01-16 PROCEDURE — 97140 MANUAL THERAPY 1/> REGIONS: CPT | Mod: GP | Performed by: PHYSICAL THERAPIST

## 2019-01-16 PROCEDURE — G8985 CARRY GOAL STATUS: HCPCS | Mod: GP | Performed by: PHYSICAL THERAPIST

## 2019-01-16 PROCEDURE — 97110 THERAPEUTIC EXERCISES: CPT | Mod: GP | Performed by: PHYSICAL THERAPIST

## 2019-01-16 PROCEDURE — G8986 CARRY D/C STATUS: HCPCS | Mod: GP | Performed by: PHYSICAL THERAPIST

## 2019-01-16 NOTE — LETTER
[facility]THLETIC [facility]AP[facility]  [address]  [facility] [address]  [phone]    2019    Re: Ronald Pearson   :   1950  MRN:  [id number]   REFERRING PHYSICIAN:   Kerri Michael    [facility]THLET[facility] PHYSICAL [facility]    Date of Initial Evaluation:  18  Visits:  Rxs Used: 5  Reason for Referral:  Chronic right shoulder pain    DISCHARGE REPORT  Progress reporting period is from 18 to 19 (5 visits).     SUBJECTIVE  Subjective changes noted by patient:  Patient notes pain is minimal now, 0-2/10.  Overall, he's doing better.  Neck is not as stiff.  Shoulder is feeling better.  Lifting is easier.  Had some mild pain during sleeping last night, but has otherwise been feeing good lately.     Current Pain level: (0-2/10).     Initial Pain level: 8/10.   Changes in function:  Yes (See Goal flowsheet attached for changes in current functional level)  Adverse reaction to treatment or activity: None  OBJECTIVE  Changes noted in objective findings:  Yes,   Objective: Cervical AROM: no pain during, min-nil loss.  Shoulder AROM no pain with flex, abd, IR, ER, ext/IR.  5/5 throughout shoulder strength testing, trace pain R shoulder ER otherwise non painful.     ASSESSMENT/PLAN  Updated problem list and treatment plan: Diagnosis 1:  R shoulder rotator cuff tendinopathy    Will transition to self management with HEP for any remaining deficits in pain, strength, function.  STG/LTGs have been met or progress has been made towards goals:  Yes (See Goal flow sheet completed today.)  Assessment of Progress: The patient's condition is improving.  Self Management Plans:  Patient has been instructed in a home treatment program.  I have re-evaluated this patient and find that the nature, scope, duration and intensity of the therapy is appropriate for the medical condition of the patient.  Ronald continues to require the following  intervention to meet STG and LTG's:  PT intervention is no longer required to meet STG/LTG.              Re: Ronald Pearson   :   1950    Recommendations:  This patient is ready to be discharged from therapy and continue their home treatment program.    Thank you for your referral.    INQUIRIES  Therapist: Harlan Maria, PT, DPT, SCS, Cert., MDT   [facility]  [address] [place] [id number]  Phone: [phone]  Fax: [phone]

## 2019-01-16 NOTE — PROGRESS NOTES
Subjective:  HPI                    Objective:  System    Physical Exam    General     ROS    Assessment/Plan:    DISCHARGE REPORT    Progress reporting period is from 11/13/18 to 1/16/19 (5 visits).       SUBJECTIVE  Subjective changes noted by patient:  Patient notes pain is minimal now, 0-2/10.  Overall, he's doing better.  Neck is not as stiff.  Shoulder is feeling better.  Lifting is easier.  Had some mild pain during sleeping last night, but has otherwise been feeing good lately.     Current Pain level: (0-2/10).     Initial Pain level: 8/10.   Changes in function:  Yes (See Goal flowsheet attached for changes in current functional level)  Adverse reaction to treatment or activity: None    OBJECTIVE  Changes noted in objective findings:  Yes,   Objective: Cervical AROM: no pain during, min-nil loss.  Shoulder AROM no pain with flex, abd, IR, ER, ext/IR.  5/5 throughout shoulder strength testing, trace pain R shoulder ER otherwise non painful.       ASSESSMENT/PLAN  Updated problem list and treatment plan: Diagnosis 1:  R shoulder rotator cuff tendinopathy    Will transition to self management with HEP for any remaining deficits in pain, strength, function.  STG/LTGs have been met or progress has been made towards goals:  Yes (See Goal flow sheet completed today.)  Assessment of Progress: The patient's condition is improving.  Self Management Plans:  Patient has been instructed in a home treatment program.  I have re-evaluated this patient and find that the nature, scope, duration and intensity of the therapy is appropriate for the medical condition of the patient.  Ronald continues to require the following intervention to meet STG and LTG's:  PT intervention is no longer required to meet STG/LTG.    Recommendations:  This patient is ready to be discharged from therapy and continue their home treatment program.    Please refer to the daily flowsheet for treatment today, total treatment time and time spent  performing 1:1 timed codes.

## 2019-10-04 ENCOUNTER — HEALTH MAINTENANCE LETTER (OUTPATIENT)
Age: 69
End: 2019-10-04

## 2019-11-05 ENCOUNTER — TELEPHONE (OUTPATIENT)
Dept: GASTROENTEROLOGY | Facility: CLINIC | Age: 69
End: 2019-11-05

## 2019-11-05 NOTE — TELEPHONE ENCOUNTER
[facility] Call Center    Phone Message    May a detailed message be left on voicemail: yes    Reason for Call: Other: Previous patient of Alyson Vazquez calling to schedule a follow up for thomas's esophagus, writer unable to get an appointment within a month. please call to discuss scheduling. thank you.      Action Taken: Message routed to:  [facility] gastro

## 2019-11-06 NOTE — TELEPHONE ENCOUNTER
Returned pt call, reached pt and scheduled appointment to see Dr. Mccarty. Pt did see past provider Pia Vazquez in 2017, at that time an EGD with Dr. Sharma revealed Barretts. Pt hasn't been to clinic since 2017 EGD. Per pt report, no severe s/s, but GERD persists and has become more noticeable in the past 2 months.

## 2019-11-07 NOTE — TELEPHONE ENCOUNTER
RECORDS RECEIVED FROM: Internal    DATE RECEIVED: 12/10/19    NOTES STATUS DETAILS   OFFICE NOTE from referring provider N/A Self referral    OFFICE NOTE from other specialist Internal Previous pt of Born APRN CNP    DISCHARGE SUMMARY from hospital N/A    OPERATIVE REPORT N/A    MEDICATION LIST N/A         ENDOSCOPY  Received 9/20/17   COLONOSCOPY Received 9/3/14   ERCP N/A    EUS N/A    STOOL TESTING N/A    PERTINENT LABS Internal    PATHOLOGY REPORTS (RELATED) Internal 9/20/17   IMAGING (CT, MRI, EGD) N/A      REFERRAL INFORMATION    Date referral was placed: 12/10/19   Date all records received: N/A   Date records were scanned into Epic: N/A   Date records were sent to Provider to review: N/A   Date and recommendation received from provider:  LETTER SENT  SCHEDULE APPOINTMENT   Date patient was contacted to schedule: 11/6/19

## 2019-12-06 ENCOUNTER — TELEPHONE (OUTPATIENT)
Dept: GASTROENTEROLOGY | Facility: CLINIC | Age: 69
End: 2019-12-06

## 2019-12-06 NOTE — TELEPHONE ENCOUNTER
Spoke to patient reminding of appointment scheduled on 12/10/19 at 0920 with [facility]. Patient to arrive 15 min early. To reschedule or cancel patient to call [phone].      YASEMIN Rodriguez

## 2019-12-10 ENCOUNTER — OFFICE VISIT (OUTPATIENT)
Dept: GASTROENTEROLOGY | Facility: CLINIC | Age: 69
End: 2019-12-10
Payer: COMMERCIAL

## 2019-12-10 ENCOUNTER — PRE VISIT (OUTPATIENT)
Dept: GASTROENTEROLOGY | Facility: CLINIC | Age: 69
End: 2019-12-10

## 2019-12-10 VITALS
WEIGHT: 176.2 LBS | DIASTOLIC BLOOD PRESSURE: 85 MMHG | BODY MASS INDEX: 26.1 KG/M2 | HEART RATE: 67 BPM | OXYGEN SATURATION: 99 % | HEIGHT: 69 IN | SYSTOLIC BLOOD PRESSURE: 143 MMHG

## 2019-12-10 DIAGNOSIS — K22.2 SCHATZKI'S RING: Primary | ICD-10-CM

## 2019-12-10 DIAGNOSIS — K22.89 MUCOSAL ABNORMALITY OF ESOPHAGUS: ICD-10-CM

## 2019-12-10 RX ORDER — NICOTINE POLACRILEX 4 MG/1
20 GUM, CHEWING ORAL DAILY
Qty: 90 TABLET | Refills: 3 | Status: SHIPPED | OUTPATIENT
Start: 2019-12-10 | End: 2021-07-08

## 2019-12-10 ASSESSMENT — PAIN SCALES - GENERAL: PAINLEVEL: NO PAIN (0)

## 2019-12-10 ASSESSMENT — MIFFLIN-ST. JEOR: SCORE: 1554.62
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
## 2019-12-10 NOTE — PATIENT INSTRUCTIONS
It was a pleasure taking care of you today.  I've included a brief summary of our discussion and care plan from today's visit below.  Please review this information with your primary care provider.  _______________________________________________________________________    My recommendations are summarized as follows:  1. Continue omeprazole 20mg once per day.   2. Schedule appointment for EGD in ~1 year- we will evaluate for Schatzki's ring and possible Patterson's esophagus.    Please call our nurse Nadira with any questions or concerns- [phone].  --    Return to GI Clinic as needed to review your progress.    _______________________________________________________________________    Who do I call with any questions after my visit?  Please be in touch if there are any further questions that arise following today's visit.  There are multiple ways to contact your gastroenterology care team.        During business hours, you may reach a Gastroenterology nurse at [phone] and choose option 3.         To schedule or reschedule an appointment, please call [phone].       You can always send a secure message through [employer].  [employer] messages are answered by your nurse or doctor typically within 24 hours.  Please allow extra time on weekends and holidays.        For urgent/emergent questions after business hours, you may reach the on-call GI Fellow by contacting the [facility] at [phone].     How will I get the results of any tests ordered?    You will receive all of your results.  If you have signed up for [employer], any tests ordered at your visit will be available to you after your physician reviews them.  Typically this takes 1-2 weeks.  If there are urgent results that require a change in your care plan, your physician or nurse will call you to discuss the next steps.      What is [employer]?  Invajo is a secure way for you to access all of your healthcare records from the [place] [facility]  [place].  It is a web based computer program, so you can sign on to it from any location.  It also allows you to send secure messages to your care team.  I recommend signing up for [employer] access if you have not already done so and are comfortable with using a computer.      How to I schedule a follow-up visit?  If you did not schedule a follow-up visit today, please call [phone] to schedule a follow-up office visit.        Sincerely,    Kenton Mccarty MD     [facility]  Division of Gastroenterology

## 2019-12-10 NOTE — NURSING NOTE
"Chief Complaint   Patient presents with     New Patient     new esophageal       Vitals:    12/10/19 0925   BP: (!) 143/85   Pulse: 67   SpO2: 99%   Weight: 79.9 kg (176 lb 3.2 oz)   Height: 1.753 m (5' 9\")       Body mass index is 26.02 kg/m .    Teresa Carrero CMA    "

## 2020-01-06 ENCOUNTER — OFFICE VISIT (OUTPATIENT)
Dept: FAMILY MEDICINE | Facility: CLINIC | Age: 70
End: 2020-01-06
Payer: COMMERCIAL

## 2020-01-06 VITALS
BODY MASS INDEX: 27.13 KG/M2 | WEIGHT: 179 LBS | OXYGEN SATURATION: 97 % | HEIGHT: 68 IN | RESPIRATION RATE: 16 BRPM | DIASTOLIC BLOOD PRESSURE: 91 MMHG | HEART RATE: 86 BPM | TEMPERATURE: 97.7 F | SYSTOLIC BLOOD PRESSURE: 156 MMHG

## 2020-01-06 DIAGNOSIS — M54.42 ACUTE BILATERAL LOW BACK PAIN WITH LEFT-SIDED SCIATICA: ICD-10-CM

## 2020-01-06 DIAGNOSIS — R07.0 THROAT PAIN: Primary | ICD-10-CM

## 2020-01-06 DIAGNOSIS — R09.82 POST-NASAL DRIP: ICD-10-CM

## 2020-01-06 LAB — S PYO AG THROAT QL IA.RAPID: NEGATIVE

## 2020-01-06 RX ORDER — SILDENAFIL 50 MG/1
50 TABLET, FILM COATED ORAL DAILY PRN
COMMUNITY
End: 2021-12-28

## 2020-01-06 RX ORDER — FLUTICASONE PROPIONATE 50 MCG
1-2 SPRAY, SUSPENSION (ML) NASAL DAILY
Qty: 9.9 ML | Refills: 1 | Status: SHIPPED | OUTPATIENT
Start: 2020-01-06 | End: 2021-07-08

## 2020-01-06 SDOH — HEALTH STABILITY: MENTAL HEALTH: HOW MANY STANDARD DRINKS CONTAINING ALCOHOL DO YOU HAVE ON A TYPICAL DAY?: 1 OR 2

## 2020-01-06 SDOH — HEALTH STABILITY: MENTAL HEALTH: HOW OFTEN DO YOU HAVE A DRINK CONTAINING ALCOHOL?: 2-3 TIMES A WEEK

## 2020-01-06 SDOH — HEALTH STABILITY: MENTAL HEALTH: HOW OFTEN DO YOU HAVE 6 OR MORE DRINKS ON ONE OCCASION?: NEVER

## 2020-01-06 ASSESSMENT — ENCOUNTER SYMPTOMS
VOICE CHANGE: 0
UNEXPECTED WEIGHT CHANGE: 0
RHINORRHEA: 1
FATIGUE: 1
SORE THROAT: 1
TROUBLE SWALLOWING: 0
SINUS PRESSURE: 0
APPETITE CHANGE: 0
FEVER: 0
CHILLS: 0
SINUS PAIN: 0
COUGH: 1

## 2020-01-06 ASSESSMENT — PATIENT HEALTH QUESTIONNAIRE - PHQ9
SUM OF ALL RESPONSES TO PHQ QUESTIONS 1-9: 3
5. POOR APPETITE OR OVEREATING: NOT AT ALL

## 2020-01-06 ASSESSMENT — ANXIETY QUESTIONNAIRES
2. NOT BEING ABLE TO STOP OR CONTROL WORRYING: NOT AT ALL
3. WORRYING TOO MUCH ABOUT DIFFERENT THINGS: SEVERAL DAYS
6. BECOMING EASILY ANNOYED OR IRRITABLE: NOT AT ALL
5. BEING SO RESTLESS THAT IT IS HARD TO SIT STILL: NOT AT ALL
GAD7 TOTAL SCORE: 2
IF YOU CHECKED OFF ANY PROBLEMS ON THIS QUESTIONNAIRE, HOW DIFFICULT HAVE THESE PROBLEMS MADE IT FOR YOU TO DO YOUR WORK, TAKE CARE OF THINGS AT HOME, OR GET ALONG WITH OTHER PEOPLE: SOMEWHAT DIFFICULT
1. FEELING NERVOUS, ANXIOUS, OR ON EDGE: SEVERAL DAYS
7. FEELING AFRAID AS IF SOMETHING AWFUL MIGHT HAPPEN: NOT AT ALL

## 2020-01-06 ASSESSMENT — MIFFLIN-ST. JEOR: SCORE: 1557.79

## 2020-01-06 NOTE — NURSING NOTE
"69 year old  Chief Complaint   Patient presents with     Pharyngitis     Pt. presents to the clinic today with a sore throat x 1 month and back pain from shoveling X 2 weeks.        Blood pressure (!) 156/91, pulse 86, temperature 97.7  F (36.5  C), temperature source Oral, resp. rate 16, height 1.737 m (5' 8.4\"), weight 81.2 kg (179 lb), SpO2 97 %. Body mass index is 26.9 kg/m .  BP completed using cuff size:    Patient Active Problem List   Diagnosis     Carotid artery dissection (H)     History of TIA (transient ischemic attack) and stroke     Carly syndrome     Stroke (H)       Wt Readings from Last 2 Encounters:   20 81.2 kg (179 lb)   12/10/19 79.9 kg (176 lb 3.2 oz)     BP Readings from Last 3 Encounters:   20 (!) 156/91   12/10/19 (!) 143/85   18 135/85       Allergies   Allergen Reactions     Reglan [Metoclopramide] Other (See Comments)     [\"Given for diagnosis of migraine, later disproved\"]   Dystonic reaction. Resolved with benadryl        Current Outpatient Medications   Medication     atorvastatin (LIPITOR) 40 MG tablet     omeprazole 20 MG tablet     sildenafil (VIAGRA) 50 MG tablet     tamsulosin (FLOMAX) 0.4 MG capsule     terbinafine (LAMISIL) 1 % cream     No current facility-administered medications for this visit.        Social History     Tobacco Use     Smoking status: Former Smoker     Last attempt to quit: 1983     Years since quittin.9     Smokeless tobacco: Never Used   Substance Use Topics     Alcohol use: Yes     Comment: beer 2 daily     Drug use: No       Honoring Choices - Health Care Directive Guide offered to patient at time of visit.    Health Maintenance Due   Topic Date Due     HEPATITIS C SCREENING  1950     ADVANCE CARE PLANNING  1950     ZOSTER IMMUNIZATION (1 of 2) 2000     PNEUMOCOCCAL IMMUNIZATION 65+ LOW/MEDIUM RISK (1 of 2 - PCV13) 2015     MEDICARE ANNUAL WELLNESS VISIT  2016     INFLUENZA VACCINE (1) 2019 "     LIPID  11/19/2019     PHQ-2  01/01/2020       Immunization History   Administered Date(s) Administered     Tdap (Adacel,Boostrix) 04/15/2014       No results found for: PAP      Recent Labs   Lab Test 01/28/15  0710 11/19/14  0430 11/17/14 2041   A1C  --  5.8  --    LDL  --  104  --    HDL  --  47  --    TRIG  --  100  --    ALT  --   --  34   CR 0.88 0.93 0.95   GFRESTIMATED 87 82 79   GFRESTBLACK >90   GFR Calc   >90   GFR Calc   >90   GFR Calc     ALBUMIN  --   --  3.6   POTASSIUM  --  4.3 3.7       PHQ-2 ( 1999 Pfizer) 1/6/2020 12/10/2019   Q1: Little interest or pleasure in doing things 0 0   Q2: Feeling down, depressed or hopeless 1 0   PHQ-2 Score 1 0   Q1: Little interest or pleasure in doing things - Not at all   Q2: Feeling down, depressed or hopeless - Not at all   PHQ-2 Score - 0       PHQ-9 SCORE 1/6/2020   PHQ-9 Total Score 3       SAKSHI-7 SCORE 1/6/2020   Total Score 2       No flowsheet data found.    Renae Jessica CMA  January 6, 2020 9:42 AM

## 2020-01-06 NOTE — PROGRESS NOTES
HPI       Ronald Pearson is a 69 year old male who presents for   Chief Complaint   Patient presents with     Pharyngitis     Pt. presents to the clinic today with a sore throat x 1 month and back pain from shoveling X 2 weeks.      Has had sore throat off and on for a month. Had a fever originally but has since resolved. Coughing in the evening. Has not taken any medication at this time. Endorses mild rhinorrhea, post-nasal drip, and congestion, denies any sinus pressure or pain. No ear pressure/pain or drainage,     Hurt back about 2 weeks ago left thoracic spine by shoulder blade and right lower back that has been hurting. Has had intermittent nubmness in either foot. No changes to bowel or bladder function. Endorses saddle anesthesia, specifically with numbness on his penis in the past two weeks. Denies any new sexual partners or genital lesions, wounds, drainage, testicular pain or swelling. Is on flomax for BPH and low stream. Denies any fevers, pathological fracture history, severe pain, weakness with legs or feet, or falls. Denies any trauma to spine, originally believed soreness is from shoveling. Pain has been gradually improving, rates at this time 4/10. Has not been taking anything OTC to help with discomfort. Reports debating informing provider of numbness on genitals.     Problem, Medication and Allergy Lists were       Current Outpatient Medications   Medication Sig Dispense Refill     atorvastatin (LIPITOR) 40 MG tablet Take 1 tablet (40 mg) by mouth daily 90 tablet 3     fluticasone (FLONASE) 50 MCG/ACT nasal spray Spray 1-2 sprays into both nostrils daily 9.9 mL 1     omeprazole 20 MG tablet Take 1 tablet (20 mg) by mouth daily in am . To be taken at least 30 to 60 minutes prior to eating. 90 tablet 3     sildenafil (VIAGRA) 50 MG tablet Take 50 mg by mouth daily as needed       tamsulosin (FLOMAX) 0.4 MG capsule Take 0.4 mg by mouth daily       terbinafine (LAMISIL) 1 % cream Apply  "topically 2 times daily           Allergies   Allergen Reactions     Reglan [Metoclopramide] Other (See Comments)     [\"Given for diagnosis of migraine, later disproved\"]   Dystonic reaction. Resolved with benadryl      Patient is a new patient to this clinic and so  I reviewed/updated the Past Medical History, the Family History and the Social History .         Review of Systems:   Review of Systems   Constitutional: Positive for fatigue. Negative for appetite change, chills, fever and unexpected weight change.   HENT: Positive for congestion, postnasal drip, rhinorrhea and sore throat. Negative for sinus pressure, sinus pain, trouble swallowing and voice change.    Respiratory: Positive for cough. Negative for choking, shortness of breath and wheezing.    Gastrointestinal: Negative for abdominal distention, abdominal pain, constipation, diarrhea, nausea and vomiting.   Genitourinary: Negative for difficulty urinating, discharge, dysuria, flank pain, frequency, penile pain, penile swelling, scrotal swelling, testicular pain and urgency.   Neurological: Positive for numbness. Negative for dizziness, tremors, syncope, weakness and light-headedness.   All other systems reviewed and are negative.           Physical Exam:     Vitals:    01/06/20 0941   BP: (!) 156/91   BP Location: Left arm   Patient Position: Chair   Cuff Size: Adult Regular   Pulse: 86   Resp: 16   Temp: 97.7  F (36.5  C)   TempSrc: Oral   SpO2: 97%   Weight: 81.2 kg (179 lb)   Height: 1.737 m (5' 8.4\")     Body mass index is 26.9 kg/m .  Vitals were reviewed and were normal     Physical Exam  Vitals signs and nursing note reviewed.   Constitutional:       General: He is not in acute distress.     Appearance: Normal appearance. He is normal weight. He is not ill-appearing, toxic-appearing or diaphoretic.   HENT:      Head: Normocephalic and atraumatic.      Right Ear: Tympanic membrane, ear canal and external ear normal. There is no impacted cerumen. "      Left Ear: Tympanic membrane, ear canal and external ear normal. There is no impacted cerumen.      Nose: Nose normal. No congestion or rhinorrhea.      Mouth/Throat:      Mouth: Mucous membranes are moist.      Pharynx: Oropharynx is clear. No oropharyngeal exudate or posterior oropharyngeal erythema.   Eyes:      General:         Right eye: No discharge.         Left eye: No discharge.      Extraocular Movements: Extraocular movements intact.      Conjunctiva/sclera: Conjunctivae normal.      Pupils: Pupils are equal, round, and reactive to light.   Neck:      Musculoskeletal: Normal range of motion and neck supple. No neck rigidity or muscular tenderness.   Cardiovascular:      Rate and Rhythm: Normal rate and regular rhythm.      Pulses: Normal pulses.      Heart sounds: Normal heart sounds. No murmur. No friction rub. No gallop.    Pulmonary:      Effort: Pulmonary effort is normal. No respiratory distress.      Breath sounds: Normal breath sounds. No stridor. No wheezing, rhonchi or rales.   Chest:      Chest wall: No tenderness.   Lymphadenopathy:      Cervical: No cervical adenopathy.   Skin:     General: Skin is warm and dry.      Coloration: Skin is not jaundiced or pale.      Findings: No bruising, erythema, lesion or rash.   Neurological:      General: No focal deficit present.      Mental Status: He is alert and oriented to person, place, and time. Mental status is at baseline.      Cranial Nerves: No cranial nerve deficit.      Sensory: No sensory deficit.      Motor: No weakness.      Coordination: Coordination normal.      Gait: Gait normal.      Deep Tendon Reflexes: Reflexes normal.   Psychiatric:         Mood and Affect: Mood normal.         Thought Content: Thought content normal.     Back Exam     Tenderness   The patient is experiencing tenderness in the thoracic.    Range of Motion   The patient has normal back ROM.  Extension: normal   Flexion: normal   Lateral bend right: normal    Lateral bend left: normal   Rotation right: normal   Rotation left: normal     Muscle Strength   The patient has normal back strength.    Tests   Straight leg raise right: negative  Straight leg raise left: negative    Reflexes   Patellar: 1/4  Achilles: 1/4    Other   Toe walk: normal  Heel walk: normal  Sensation: normal  Gait: normal   Erythema: no back redness  Scars: absent            Results:   Results are ordered and pending    Assessment and Plan        1. Throat pain  - Rapid Strep Screen (Group) ([facility]) - negative  - Beta strep group A culture    2. Post-nasal drip  Will trial treating postnasal drip to aid with sore throat resolution as rapid strep test was negative. Reviewed side effects of medication, risks and benefits, and proper medication administration. Discussed red flag symptoms and when patient should seek immediate medical attention. Ronald Pearson verbalized understanding.   - fluticasone (FLONASE) 50 MCG/ACT nasal spray; Spray 1-2 sprays into both nostrils daily  Dispense: 9.9 mL; Refill: 1    3. Acute bilateral low back pain with left-sided sciatica  With numbness on genitals, bilateral feet that correlate with onset of pain will order MRI to rule out cauda equina syndrome. Harlan hesitant to commit to scheduling at time of office visit and would prefer to check with insurance for where he should go to have best price of imaging. Discussed possibilities of infection (less like), inflammation, malignancy, narrowing of spinal canal, or fracture and best way to rule out is to having imaging done ASAP. Harlan stated he would call clinic later in day to have orders for MRI sent to desired location ([facility] versus [facility]).   - MR Lumbar Spine w/o Contrast; Future       There are no discontinued medications.    Options for treatment and follow-up care were reviewed with the patient. Ronald Pearson  engaged in the decision making process and verbalized understanding of the options discussed and  agreed with the final plan.    KRISTINE Nguyen CNP

## 2020-01-06 NOTE — PATIENT INSTRUCTIONS
Flonase nasal spray 1-2 spray daily into nose    Call [facility] and find where the best place for you to have an MRI is for you lumbar spine.

## 2020-01-07 ENCOUNTER — MYC MEDICAL ADVICE (OUTPATIENT)
Dept: FAMILY MEDICINE | Facility: CLINIC | Age: 70
End: 2020-01-07

## 2020-01-07 ASSESSMENT — ENCOUNTER SYMPTOMS
CHOKING: 0
DIFFICULTY URINATING: 0
DIARRHEA: 0
FLANK PAIN: 0
ABDOMINAL PAIN: 0
DIZZINESS: 0
NAUSEA: 0
WEAKNESS: 0
SHORTNESS OF BREATH: 0
CONSTIPATION: 0
VOMITING: 0
TREMORS: 0
ABDOMINAL DISTENTION: 0
WHEEZING: 0
DYSURIA: 0
LIGHT-HEADEDNESS: 0
NUMBNESS: 1
FREQUENCY: 0

## 2020-01-07 ASSESSMENT — ANXIETY QUESTIONNAIRES: GAD7 TOTAL SCORE: 2

## 2020-01-08 LAB
BACTERIA SPEC CULT: NORMAL
Lab: NORMAL
SPECIMEN SOURCE: NORMAL

## 2020-01-12 ENCOUNTER — ANCILLARY PROCEDURE (OUTPATIENT)
Dept: MRI IMAGING | Facility: CLINIC | Age: 70
End: 2020-01-12
Attending: NURSE PRACTITIONER
Payer: COMMERCIAL

## 2020-01-12 DIAGNOSIS — M54.42 ACUTE BILATERAL LOW BACK PAIN WITH LEFT-SIDED SCIATICA: ICD-10-CM

## 2020-01-14 DIAGNOSIS — M54.42 ACUTE BILATERAL LOW BACK PAIN WITH LEFT-SIDED SCIATICA: Primary | ICD-10-CM

## 2020-01-16 DIAGNOSIS — H25.10 SENILE NUCLEAR SCLEROSIS: Primary | ICD-10-CM

## 2020-01-20 ENCOUNTER — OFFICE VISIT (OUTPATIENT)
Dept: OPHTHALMOLOGY | Facility: CLINIC | Age: 70
End: 2020-01-20
Attending: OPHTHALMOLOGY
Payer: COMMERCIAL

## 2020-01-20 DIAGNOSIS — H52.4 PRESBYOPIA: ICD-10-CM

## 2020-01-20 DIAGNOSIS — H52.203 MYOPIC ASTIGMATISM OF BOTH EYES: ICD-10-CM

## 2020-01-20 DIAGNOSIS — H52.13 MYOPIC ASTIGMATISM OF BOTH EYES: ICD-10-CM

## 2020-01-20 DIAGNOSIS — G90.2 HORNER SYNDROME: ICD-10-CM

## 2020-01-20 DIAGNOSIS — H25.813 COMBINED FORM OF SENILE CATARACT OF BOTH EYES: Primary | ICD-10-CM

## 2020-01-20 PROCEDURE — G0463 HOSPITAL OUTPT CLINIC VISIT: HCPCS | Mod: ZF

## 2020-01-20 PROCEDURE — 92015 DETERMINE REFRACTIVE STATE: CPT | Mod: ZF

## 2020-01-20 ASSESSMENT — TONOMETRY
IOP_METHOD: TONOPEN
OS_IOP_MMHG: 14
OD_IOP_MMHG: 14

## 2020-01-20 ASSESSMENT — VISUAL ACUITY
OD_CC: 20/25
METHOD: SNELLEN - LINEAR
CORRECTION_TYPE: GLASSES
OS_BAT_MED: 20/20
OD_BAT_HIGH: 20/30
OD_BAT_LOW: 20/25
OS_BAT_LOW: 20/20
OD_BAT_MED: 20/20
OS_BAT_HIGH: 20/20
OS_CC: 20/20

## 2020-01-20 ASSESSMENT — REFRACTION_MANIFEST
OD_SPHERE: -3.00
OD_CYLINDER: +0.50
OD_AXIS: 145
OD_AXIS: 145
OS_SPHERE: -2.50
OS_SPHERE: -0.75
OD_CYLINDER: +0.50
OS_CYLINDER: SPHERE
OS_CYLINDER: SPHERE
OD_SPHERE: -1.25

## 2020-01-20 ASSESSMENT — CUP TO DISC RATIO
OD_RATIO: 0.4
OS_RATIO: 0.35

## 2020-01-20 ASSESSMENT — CONF VISUAL FIELD
OD_NORMAL: 1
OS_NORMAL: 1

## 2020-01-20 ASSESSMENT — REFRACTION_WEARINGRX
OS_SPHERE: -2.00
OD_CYLINDER: +0.50
OS_CYLINDER: +0.25
SPECS_TYPE: SVL
OD_AXIS: 070
OS_AXIS: 115
OD_SPHERE: -2.50

## 2020-01-20 ASSESSMENT — EXTERNAL EXAM - RIGHT EYE: OD_EXAM: NORMAL

## 2020-01-20 ASSESSMENT — EXTERNAL EXAM - LEFT EYE: OS_EXAM: NORMAL

## 2020-01-20 NOTE — PROGRESS NOTES
HPI  Ronald Pearson is a 69 year old male here for full eye exam. He feels his vision is overall good and stable with current glasses. He notes mild trouble with glare when driving at night in unfamiliar areas and a little bit of trouble reading music in dim lighting, but he does well with good lighting. No pain, redness, or discharge. No flashes/floaters.     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   - Flomax patient   - HLD    PMH:  - Multiple mini-strokes s/p LICA dissection 11/2014 without residual deficits.      Assessment & Plan    (H26.9) Cataracts, bilateral  (primary encounter diagnosis)  Comment: May be some early visual significance with glare symptoms, but vision 20/20 OD and 20/15 OS today.  BAT's to 20/30 and 20/20.  R > L.  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 Etiology already known - secondary to his left internal carotid dissection in November of 2014. He was diagnosed with mini-strokes related to the dissection, but has no sequelae from these. He is now on anticoagulation, and per the patient, the size of his artery has returned to normal.  Plan: Discussed that these findings will likely persist over time. If the ptosis becomes bothersome, can consider ptosis repair surgery. He is currently not bothered, so will observe.    (H52.203) Myopic astigmatism of both eyes  (H52.4) Presbyopia  Comment: Good vision with current glasses, Takes glasses off to read  Plan: Observe     -----------------------------------------------------------------------------------    Patient disposition:   Return to clinic in 1 year with BAT testing or sooner as needed.    Natalio Pierre, PGY2  Ophthalmology Resident      Teaching statement:  Complete documentation of historical and exam elements from today's encounter can be found in the full encounter summary report (not reduplicated in this  progress note). I personally obtained the chief complaint(s) and history of present illness.  I confirmed and edited as necessary the review of systems, past medical/surgical history, family history, social history, and examination findings as documented by others; and I examined the patient myself. I personally reviewed the relevant tests, images, and reports as documented above.     I formulated and edited as necessary the assessment and plan and discussed the findings and management plan with the patient and family.    Kemi Mares MD  Comprehensive Ophthalmology & Ocular Pathology  Department of Ophthalmology and Visual Neurosciences  [email or web address]  Pager [phone]

## 2020-01-20 NOTE — NURSING NOTE
Chief Complaints and History of Present Illnesses   Patient presents with     Annual Eye Exam     Chief Complaint(s) and History of Present Illness(es)     Annual Eye Exam     Laterality: both eyes    Associated symptoms: flashes (rare ), floaters and glare.  Negative for haloes and eye pain    Treatments tried: no treatments    Pain scale: 0/10              Comments     Routine exam  Doing good w current glasses  Kimmie QUEZADA 9:04 AM January 20, 2020

## 2020-01-22 NOTE — TELEPHONE ENCOUNTER
DIAGNOSIS: Acute bilateral low back pain with left-sided sciatica/MRI/Kia Boateng APRN CNP/[facility]/ortho con   APPOINTMENT DATE: 2/12   NOTES STATUS DETAILS   OFFICE NOTE from referring provider internal Kia Boateng APRN CNP   OFFICE NOTE from other specialist Internal    DISCHARGE SUMMARY from hospital N/A    DISCHARGE REPORT from the ER N/A    OPERATIVE REPORT N/A    MEDICATION LIST Internal    MRI Internal 1/12/20   CT SCAN N/A    XRAYS (IMAGES & REPORTS) N/A

## 2020-02-08 ENCOUNTER — HEALTH MAINTENANCE LETTER (OUTPATIENT)
Age: 70
End: 2020-02-08

## 2020-02-12 ENCOUNTER — PRE VISIT (OUTPATIENT)
Dept: ORTHOPEDICS | Facility: CLINIC | Age: 70
End: 2020-02-12

## 2020-02-12 ENCOUNTER — OFFICE VISIT (OUTPATIENT)
Dept: ORTHOPEDICS | Facility: CLINIC | Age: 70
End: 2020-02-12
Payer: COMMERCIAL

## 2020-02-12 VITALS — WEIGHT: 179 LBS | HEIGHT: 69 IN | BODY MASS INDEX: 26.51 KG/M2

## 2020-02-12 DIAGNOSIS — M25.552 LEFT HIP PAIN: ICD-10-CM

## 2020-02-12 DIAGNOSIS — M54.50 CHRONIC BILATERAL LOW BACK PAIN WITHOUT SCIATICA: Primary | ICD-10-CM

## 2020-02-12 DIAGNOSIS — G89.29 CHRONIC BILATERAL LOW BACK PAIN WITHOUT SCIATICA: Primary | ICD-10-CM

## 2020-02-12 ASSESSMENT — PAIN SCALES - GENERAL: PAINLEVEL: NO PAIN (1)

## 2020-02-12 ASSESSMENT — MIFFLIN-ST. JEOR: SCORE: 1567.32
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
## 2020-02-19 ENCOUNTER — THERAPY VISIT (OUTPATIENT)
Dept: PHYSICAL THERAPY | Facility: CLINIC | Age: 70
End: 2020-02-19
Attending: FAMILY MEDICINE
Payer: COMMERCIAL

## 2020-02-19 DIAGNOSIS — G89.29 CHRONIC BILATERAL LOW BACK PAIN WITH LEFT-SIDED SCIATICA: ICD-10-CM

## 2020-02-19 DIAGNOSIS — G89.29 CHRONIC BILATERAL LOW BACK PAIN WITHOUT SCIATICA: ICD-10-CM

## 2020-02-19 DIAGNOSIS — M54.42 CHRONIC BILATERAL LOW BACK PAIN WITH LEFT-SIDED SCIATICA: ICD-10-CM

## 2020-02-19 DIAGNOSIS — M54.50 CHRONIC BILATERAL LOW BACK PAIN WITHOUT SCIATICA: ICD-10-CM

## 2020-02-19 DIAGNOSIS — M25.552 LEFT HIP PAIN: ICD-10-CM

## 2020-02-19 PROCEDURE — 97530 THERAPEUTIC ACTIVITIES: CPT | Mod: GP | Performed by: PHYSICAL THERAPIST

## 2020-02-19 PROCEDURE — 97161 PT EVAL LOW COMPLEX 20 MIN: CPT | Mod: GP | Performed by: PHYSICAL THERAPIST

## 2020-02-19 PROCEDURE — 97110 THERAPEUTIC EXERCISES: CPT | Mod: GP | Performed by: PHYSICAL THERAPIST

## 2020-02-19 NOTE — PROGRESS NOTES
[facility] Initial Evaluation    Subjective:  Ronald Pearson is a 69 year old male with a lumbar condition. Symptoms commenced as a result of: shoveling snow. Condition occurred in the following environment: at home. Onset of symptoms: November 2019. Location of symptoms: bilateral low back, left buttock, left hip, left ankle and foot. Pain level on number scale: 2-4/10. Quality of pain: tender, spasm. Associated symptoms: numbness and tingling. Pain frequency (constant/intermittent): intermittent. Symptoms are exacerbated by: shoveling, sit<>stand, prolonged sitting. Symptoms are relieved by: rest, stretching. Progression of symptoms since onset (same/better/worse): better. Special tests (x-ray, MRI, CT scan, EMG, bone scan): MRI. Previous treatment: None. Improvement with previous treatment: NA. General health as reported by patient is good. Pertinent medical history includes: See Epic. Medical allergies: see Epic. Other pertinent surgeries: see Epic. Current medications: See Epic. Occupation: musical . Patient is (working in normal job without restrictions/working in normal job with restrictions/working in an alternate job/not working due to present treatment problem): working in normal job without restrictions. Primary job tasks: prolonged sitting. Barriers at home/work: None reported by patient. Red flags: None reported by patient.    Objective  Posture    Sitting (good/fair/poor): poor  Standing (good/fair/poor):  fair  Lordosis (red/acc/normal):  reduced  Lateral shift (right/left/nil):  nil  Relevant lateral shift (yes/no):  no  Correction of posture (better/worse/no effect): better    Neurological    Myotomes L R   L1-2 (hip flexion) 5/5 5/5   L3 (knee extension) 5/5 5/5   L4 (ankle DF) 5/5 5/5   L5 (g. toe ext) 5/5 5/5   S1 (ankle PF or knee flex) 5/5 5/5     Sensory Deficit, Reflexes: lumbar dermatomes WNL    Dural Signs L R   Slump - -   SLR       Lumbar  Movement Loss Jd Mod Min Nil Pain   Flexion   x  +   Extension  x      Side Gliding L   x     Side Gliding R   x       Assessment/Plan:    Patient is a 69 year old male with lumbar complaints.    Patient has the following significant findings with corresponding treatment plan.                Diagnosis 1:  LBP with left radiculopathy  Pain -  manual therapy, self management, education, directional preference exercise and home program  Decreased ROM/flexibility - manual therapy and therapeutic exercise  Decreased joint mobility - manual therapy and therapeutic exercise  Decreased strength - therapeutic exercise and therapeutic activities  Impaired muscle performance - neuro re-education  Decreased function - therapeutic activities  Impaired posture - neuro re-education    Previous and current functional limitations:  (See Goal Flow Sheet for this information)    Short term and Long term goals: (See Goal Flow Sheet for this information)     Communication ability:  Patient appears to be able to clearly communicate and understand verbal and written communication and follow directions correctly.  Treatment Explanation - The following has been discussed with the patient:   RX ordered/plan of care  Anticipated outcomes  Possible risks and side effects  This patient would benefit from PT intervention to resume normal activities.   Rehab potential is good.    Frequency:  1 X week, once daily  Duration:  for 4 weeks tapering to 2 X a month over 1 month  Discharge Plan:  Achieve all LTG.  Independent in home treatment program.  Reach maximal therapeutic benefit.    Please refer to the daily flowsheet for treatment today, total treatment time and time spent performing 1:1 timed codes.

## 2020-02-24 ENCOUNTER — THERAPY VISIT (OUTPATIENT)
Dept: PHYSICAL THERAPY | Facility: CLINIC | Age: 70
End: 2020-02-24
Payer: COMMERCIAL

## 2020-02-24 DIAGNOSIS — G89.29 CHRONIC BILATERAL LOW BACK PAIN WITH LEFT-SIDED SCIATICA: ICD-10-CM

## 2020-02-24 DIAGNOSIS — M54.42 CHRONIC BILATERAL LOW BACK PAIN WITH LEFT-SIDED SCIATICA: ICD-10-CM

## 2020-02-24 PROCEDURE — 97110 THERAPEUTIC EXERCISES: CPT | Mod: GP | Performed by: PHYSICAL THERAPIST

## 2020-02-24 PROCEDURE — 97530 THERAPEUTIC ACTIVITIES: CPT | Mod: GP | Performed by: PHYSICAL THERAPIST

## 2020-03-02 ENCOUNTER — THERAPY VISIT (OUTPATIENT)
Dept: PHYSICAL THERAPY | Facility: CLINIC | Age: 70
End: 2020-03-02
Payer: COMMERCIAL

## 2020-03-02 DIAGNOSIS — G89.29 CHRONIC BILATERAL LOW BACK PAIN WITH LEFT-SIDED SCIATICA: ICD-10-CM

## 2020-03-02 DIAGNOSIS — M54.42 CHRONIC BILATERAL LOW BACK PAIN WITH LEFT-SIDED SCIATICA: ICD-10-CM

## 2020-03-02 PROCEDURE — 97110 THERAPEUTIC EXERCISES: CPT | Mod: GP | Performed by: PHYSICAL THERAPIST

## 2020-03-23 PROBLEM — M54.42 CHRONIC BILATERAL LOW BACK PAIN WITH LEFT-SIDED SCIATICA: Status: RESOLVED | Noted: 2020-02-19 | Resolved: 2020-03-23

## 2020-03-23 PROBLEM — G89.29 CHRONIC BILATERAL LOW BACK PAIN WITH LEFT-SIDED SCIATICA: Status: RESOLVED | Noted: 2020-02-19 | Resolved: 2020-03-23

## 2020-03-23 NOTE — PROGRESS NOTES
Discharge Note    Progress reporting period is from initial evaluation date (please see noted date below) to Mar 2, 2020.  Linked Episodes   Type: Episode: Status: Noted: Resolved: Last update: Updated by:   PHYSICAL THERAPY lbp Active 2/19/2020  3/2/2020 10:54 AM Ronn Gallagher, PT      Comments:       Ronald failed to follow up and current status is unknown.  Please see information below for last relevant information on current status.  Patient seen for 3 visits.    SUBJECTIVE  Subjective changes noted by patient:  Patient reports improving pain symptoms and flexibility. Most pain today is located in right low back. Some stiffness today in right leg - buttock to thigh.  .  Current pain level is 2/10.     Previous pain level was  4/10.   Changes in function:  Yes (See Goal flowsheet attached for changes in current functional level)  Adverse reaction to treatment or activity: None    OBJECTIVE  Changes noted in objective findings: lumbar AROM: FL = WNL and no pain, EXT = min loss and no pain     ASSESSMENT/PLAN  Diagnosis: LBP with left radiculopathy   Updated problem list and treatment plan:     STG/LTGs have been met or progress has been made towards goals:  Yes, please see goal flowsheet for most current information  Assessment of Progress: current status is unknown.    Last current status: Pt is progressing as expected   Self Management Plans:  HEP  I have re-evaluated this patient and find that the nature, scope, duration and intensity of the therapy is appropriate for the medical condition of the patient.  Ronald continues to require the following intervention to meet STG and LTG's:  HEP.    Recommendations:  Discharge with current home program.  Patient to follow up with MD as needed.    Please refer to the daily flowsheet for treatment today, total treatment time and time spent performing 1:1 timed codes.

## 2020-09-24 ENCOUNTER — OFFICE VISIT (OUTPATIENT)
Dept: FAMILY MEDICINE | Facility: CLINIC | Age: 70
End: 2020-09-24
Payer: COMMERCIAL

## 2020-09-24 VITALS
RESPIRATION RATE: 16 BRPM | HEART RATE: 98 BPM | TEMPERATURE: 98.7 F | DIASTOLIC BLOOD PRESSURE: 86 MMHG | HEIGHT: 69 IN | BODY MASS INDEX: 25.62 KG/M2 | SYSTOLIC BLOOD PRESSURE: 134 MMHG | OXYGEN SATURATION: 98 % | WEIGHT: 173 LBS

## 2020-09-24 DIAGNOSIS — N40.1 BENIGN PROSTATIC HYPERPLASIA WITH WEAK URINARY STREAM: Primary | ICD-10-CM

## 2020-09-24 DIAGNOSIS — R39.12 BENIGN PROSTATIC HYPERPLASIA WITH WEAK URINARY STREAM: Primary | ICD-10-CM

## 2020-09-24 ASSESSMENT — MIFFLIN-ST. JEOR: SCORE: 1535.1

## 2020-09-24 NOTE — NURSING NOTE
"70 year old  Chief Complaint   Patient presents with     Consult     Pt. presents to the clinic today with questions about medication Flomax causing a rash and other side affects.        Blood pressure 134/86, pulse 98, temperature 98.7  F (37.1  C), temperature source Oral, resp. rate 16, height 1.753 m (5' 9\"), weight 78.5 kg (173 lb), SpO2 98 %. Body mass index is 25.55 kg/m .  BP completed using cuff size:    Patient Active Problem List   Diagnosis     Carotid artery dissection (H)     History of TIA (transient ischemic attack) and stroke     Carly syndrome     Stroke (H)       Wt Readings from Last 2 Encounters:   20 78.5 kg (173 lb)   20 81.2 kg (179 lb)     BP Readings from Last 3 Encounters:   20 134/86   20 (!) 156/91   12/10/19 (!) 143/85       Allergies   Allergen Reactions     Reglan [Metoclopramide] Other (See Comments)     [\"Given for diagnosis of migraine, later disproved\"]   Dystonic reaction. Resolved with benadryl        Current Outpatient Medications   Medication     sildenafil (VIAGRA) 50 MG tablet     tamsulosin (FLOMAX) 0.4 MG capsule     atorvastatin (LIPITOR) 40 MG tablet     fluticasone (FLONASE) 50 MCG/ACT nasal spray     omeprazole 20 MG tablet     terbinafine (LAMISIL) 1 % cream     No current facility-administered medications for this visit.        Social History     Tobacco Use     Smoking status: Former Smoker     Last attempt to quit: 1983     Years since quittin.6     Smokeless tobacco: Never Used     Tobacco comment: does not vape    Substance Use Topics     Alcohol use: Yes     Frequency: 2-3 times a week     Drinks per session: 1 or 2     Binge frequency: Never     Comment: beer 2 daily     Drug use: No       Honoring Choices - Health Care Directive Guide offered to patient at time of visit.    Health Maintenance Due   Topic Date Due     HEPATITIS C SCREENING  1950     ADVANCE CARE PLANNING  1950     ZOSTER IMMUNIZATION (1 of 2) " 05/17/2000     PNEUMOCOCCAL IMMUNIZATION 65+ LOW/MEDIUM RISK (1 of 2 - PCV13) 05/17/2015     MEDICARE ANNUAL WELLNESS VISIT  07/30/2016     LIPID  11/19/2019     INFLUENZA VACCINE (1) 09/01/2020       Immunization History   Administered Date(s) Administered     Tdap (Adacel,Boostrix) 04/15/2014       No results found for: PAP      Recent Labs   Lab Test 01/28/15  0710 11/19/14  0430 11/17/14 2041   A1C  --  5.8  --    LDL  --  104  --    HDL  --  47  --    TRIG  --  100  --    ALT  --   --  34   CR 0.88 0.93 0.95   GFRESTIMATED 87 82 79   GFRESTBLACK >90   GFR Calc   >90   GFR Calc   >90   GFR Calc     ALBUMIN  --   --  3.6   POTASSIUM  --  4.3 3.7       PHQ-2 ( 1999 Pfizer) 1/6/2020 12/10/2019   Q1: Little interest or pleasure in doing things 0 0   Q2: Feeling down, depressed or hopeless 1 0   PHQ-2 Score 1 0   Q1: Little interest or pleasure in doing things - Not at all   Q2: Feeling down, depressed or hopeless - Not at all   PHQ-2 Score - 0       PHQ-9 SCORE 1/6/2020   PHQ-9 Total Score 3       SAKSHI-7 SCORE 1/6/2020   Total Score 2       No flowsheet data found.        Renae Jessica CMA  September 24, 2020 9:33 AM
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
## 2020-11-08 ENCOUNTER — HEALTH MAINTENANCE LETTER (OUTPATIENT)
Age: 70
End: 2020-11-08

## 2021-03-27 ENCOUNTER — HEALTH MAINTENANCE LETTER (OUTPATIENT)
Age: 71
End: 2021-03-27

## 2021-04-07 ENCOUNTER — OFFICE VISIT (OUTPATIENT)
Dept: OPHTHALMOLOGY | Facility: CLINIC | Age: 71
End: 2021-04-07
Attending: OPHTHALMOLOGY
Payer: COMMERCIAL

## 2021-04-07 DIAGNOSIS — H52.4 PRESBYOPIA: ICD-10-CM

## 2021-04-07 DIAGNOSIS — G90.2 HORNER SYNDROME: ICD-10-CM

## 2021-04-07 DIAGNOSIS — H52.13 MYOPIC ASTIGMATISM OF BOTH EYES: ICD-10-CM

## 2021-04-07 DIAGNOSIS — H25.13 NUCLEAR SCLEROTIC CATARACT OF BOTH EYES: Primary | ICD-10-CM

## 2021-04-07 DIAGNOSIS — H52.203 MYOPIC ASTIGMATISM OF BOTH EYES: ICD-10-CM

## 2021-04-07 PROCEDURE — G0463 HOSPITAL OUTPT CLINIC VISIT: HCPCS

## 2021-04-07 PROCEDURE — 92014 COMPRE OPH EXAM EST PT 1/>: CPT | Mod: GC | Performed by: OPHTHALMOLOGY

## 2021-04-07 ASSESSMENT — REFRACTION_WEARINGRX
OD_AXIS: 070
OS_SPHERE: -2.00
OD_CYLINDER: +0.50
OD_SPHERE: -2.50
SPECS_TYPE: SVL
OS_AXIS: 115
OS_CYLINDER: +0.25

## 2021-04-07 ASSESSMENT — EXTERNAL EXAM - RIGHT EYE: OD_EXAM: NORMAL

## 2021-04-07 ASSESSMENT — TONOMETRY
OS_IOP_MMHG: 12
IOP_METHOD: TONOPEN
OD_IOP_MMHG: 14

## 2021-04-07 ASSESSMENT — VISUAL ACUITY
CORRECTION_TYPE: GLASSES
OD_CC+: -2
OS_BAT_HIGH: 20/25
OD_BAT_HIGH: 20/40-1
OD_BAT_MED: 20/30-2
OS_CC: 20/20
METHOD: SNELLEN - LINEAR
OD_CC: 20/25
OS_BAT_MED: 20/25

## 2021-04-07 ASSESSMENT — EXTERNAL EXAM - LEFT EYE: OS_EXAM: NORMAL

## 2021-04-07 ASSESSMENT — CUP TO DISC RATIO
OD_RATIO: 0.4
OS_RATIO: 0.35

## 2021-04-07 ASSESSMENT — CONF VISUAL FIELD
OD_NORMAL: 1
OS_NORMAL: 1

## 2021-04-07 NOTE — PROGRESS NOTES
HPI  Ronald Pearson is a 70 year old male here for full eye exam.     Last clinic visit 1/20/20. He feels vision is stable since then. He notes difficulty with glare and halos, particularly with driving at nighttime. No pain, redness, or discharge. No flashes/floaters.      Patient self-discontinued flomax (for BPH) around 09/2020 secondary to concern with complications regarding cataract surgery. Patient's father reportedly had bleeding associated with cataract surgery, and was on flomax. He is planning to follow up with urology this spring.    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   - Flomax patient   - HLD    PMH:  - Multiple mini-strokes s/p LICA dissection 11/2014 without residual deficits.    Assessment & Plan    (H26.9) Cataracts, bilateral  (primary encounter diagnosis)  Comment: May be some early visual significance with glare symptoms, but vision 20/20 OD and 20/15 OS today.  BAT testing on medium with 20/30 and 20/25. R > L.  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 Etiology already known - secondary to his left internal carotid dissection in November of 2014. He was diagnosed with mini-strokes related to the dissection, but has no sequelae from these. He is now on anticoagulation, and per the patient, the size of his artery has returned to normal.  Plan: Discussed that these findings will likely persist over time. If the ptosis becomes bothersome, can consider ptosis repair surgery. He is currently not bothered, so will observe.    (H52.203) Myopic astigmatism of both eyes  (H52.4) Presbyopia  Comment: Good vision with current glasses, Takes glasses off to read  Plan: Observe     -----------------------------------------------------------------------------------    Patient disposition: 1 year VALDO Whitfield MD  Ophthalmology Resident, PGY-2    Teaching  statement:  Complete documentation of historical and exam elements from today's encounter can be found in the full encounter summary report (not reduplicated in this progress note). I personally obtained the chief complaint(s) and history of present illness.  I confirmed and edited as necessary the review of systems, past medical/surgical history, family history, social history, and examination findings as documented by others; and I examined the patient myself. I personally reviewed the relevant tests, images, and reports as documented above.     I formulated and edited as necessary the assessment and plan and discussed the findings and management plan with the patient and family    Kemi Mares MD  Comprehensive Ophthalmology & Ocular Pathology  Department of Ophthalmology and Visual Neurosciences  [email or web address]  Pager [phone]

## 2021-04-07 NOTE — NURSING NOTE
Chief Complaints and History of Present Illnesses   Patient presents with     Follow Up     Annual f/u for cataracts/Carly syndrome     Chief Complaint(s) and History of Present Illness(es)     Follow Up     Laterality: both eyes    Associated symptoms: haloes (around headlights with night driving), floaters (occasionally notes a few floaters) and flashes (notes a quick flash randomly in the corner of the LE>RE).  Negative for eye pain    Pain scale: 0/10    Comments: Annual f/u for cataracts/Carly syndrome              Comments     Pt feels vision is stable - does note haloes around headlights with night driving and feels he can't see as well at night    No drops    PILAR Hinton 10:04 AM April 7, 2021

## 2021-06-08 ENCOUNTER — OFFICE VISIT (OUTPATIENT)
Dept: UROLOGY | Facility: CLINIC | Age: 71
End: 2021-06-08
Payer: COMMERCIAL

## 2021-06-08 VITALS
WEIGHT: 175 LBS | DIASTOLIC BLOOD PRESSURE: 92 MMHG | HEART RATE: 82 BPM | HEIGHT: 69 IN | SYSTOLIC BLOOD PRESSURE: 159 MMHG | BODY MASS INDEX: 25.92 KG/M2

## 2021-06-08 DIAGNOSIS — R39.12 BENIGN PROSTATIC HYPERPLASIA WITH WEAK URINARY STREAM: Primary | ICD-10-CM

## 2021-06-08 DIAGNOSIS — N40.1 BENIGN PROSTATIC HYPERPLASIA WITH WEAK URINARY STREAM: Primary | ICD-10-CM

## 2021-06-08 PROCEDURE — 51741 ELECTRO-UROFLOWMETRY FIRST: CPT | Performed by: NURSE PRACTITIONER

## 2021-06-08 PROCEDURE — 51798 US URINE CAPACITY MEASURE: CPT | Performed by: NURSE PRACTITIONER

## 2021-06-08 PROCEDURE — 99203 OFFICE O/P NEW LOW 30 MIN: CPT | Mod: 25 | Performed by: NURSE PRACTITIONER

## 2021-06-08 RX ORDER — TAMSULOSIN HYDROCHLORIDE 0.4 MG/1
0.4 CAPSULE ORAL DAILY
Qty: 90 CAPSULE | Refills: 3 | Status: SHIPPED | OUTPATIENT
Start: 2021-06-08 | End: 2022-03-15

## 2021-06-08 ASSESSMENT — PAIN SCALES - GENERAL: PAINLEVEL: NO PAIN (0)

## 2021-06-08 ASSESSMENT — MIFFLIN-ST. JEOR: SCORE: 1539.17
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
## 2021-06-08 NOTE — NURSING NOTE
"Chief Complaint   Patient presents with     Follow Up     BPH /ED       Blood pressure (!) 159/92, pulse 82, height 1.753 m (5' 9\"), weight 79.4 kg (175 lb). Body mass index is 25.84 kg/m .    Patient Active Problem List   Diagnosis     Carotid artery dissection (H)     History of TIA (transient ischemic attack) and stroke     Carly syndrome     Stroke (H)       Allergies   Allergen Reactions     Reglan [Metoclopramide] Other (See Comments)     [\"Given for diagnosis of migraine, later disproved\"]   Dystonic reaction. Resolved with benadryl        Current Outpatient Medications   Medication Sig Dispense Refill     atorvastatin (LIPITOR) 40 MG tablet Take 1 tablet (40 mg) by mouth daily (Patient not taking: Reported on 2020) 90 tablet 3     fluticasone (FLONASE) 50 MCG/ACT nasal spray Spray 1-2 sprays into both nostrils daily (Patient not taking: Reported on 2020) 9.9 mL 1     omeprazole 20 MG tablet Take 1 tablet (20 mg) by mouth daily in am . To be taken at least 30 to 60 minutes prior to eating. (Patient not taking: Reported on 2020) 90 tablet 3     sildenafil (VIAGRA) 50 MG tablet Take 50 mg by mouth daily as needed       tamsulosin (FLOMAX) 0.4 MG capsule Take 0.4 mg by mouth daily       terbinafine (LAMISIL) 1 % cream Apply topically 2 times daily         Social History     Tobacco Use     Smoking status: Former Smoker     Quit date: 1983     Years since quittin.3     Smokeless tobacco: Never Used     Tobacco comment: does not vape    Substance Use Topics     Alcohol use: Yes     Frequency: 2-3 times a week     Drinks per session: 1 or 2     Binge frequency: Never     Comment: beer 2 daily     Drug use: No       Primo Zuniga  2021  10:27 AM  " Color consistent with ethnicity/race, warm, dry intact, resilient.

## 2021-06-08 NOTE — PATIENT INSTRUCTIONS
UROLOGY CLINIC VISIT PATIENT INSTRUCTIONS    -Restart the Flomax 0.4 mg daily. Can take in the evening.   -Follow up with me in 6 months for a virtual visit.    If you have any issues, questions or concerns in the meantime, do not hesitate to contact us at [phone] or via [employer]     It was a pleasure meeting with you today.  Thank you for allowing me and my team the privilege of caring for you today.  YOU are the reason we are here, and I truly hope we provided you with the excellent service you deserve.  Please let us know if there is anything else we can do for you so that we can be sure you are leaving completely satisfied with your care experience.    Darlene Sung, CNP

## 2021-06-09 ENCOUNTER — TELEPHONE (OUTPATIENT)
Dept: UROLOGY | Facility: CLINIC | Age: 71
End: 2021-06-09

## 2021-07-08 ENCOUNTER — OFFICE VISIT (OUTPATIENT)
Dept: FAMILY MEDICINE | Facility: CLINIC | Age: 71
End: 2021-07-08
Payer: COMMERCIAL

## 2021-07-08 VITALS
BODY MASS INDEX: 26.1 KG/M2 | WEIGHT: 172.2 LBS | HEIGHT: 68 IN | TEMPERATURE: 98.7 F | HEART RATE: 71 BPM | DIASTOLIC BLOOD PRESSURE: 87 MMHG | SYSTOLIC BLOOD PRESSURE: 146 MMHG | OXYGEN SATURATION: 98 %

## 2021-07-08 DIAGNOSIS — Z00.00 ROUTINE HISTORY AND PHYSICAL EXAMINATION OF ADULT: Primary | ICD-10-CM

## 2021-07-08 DIAGNOSIS — M25.562 CHRONIC PAIN OF LEFT KNEE: ICD-10-CM

## 2021-07-08 DIAGNOSIS — G89.29 CHRONIC PAIN OF LEFT KNEE: ICD-10-CM

## 2021-07-08 DIAGNOSIS — F41.9 ANXIETY: ICD-10-CM

## 2021-07-08 DIAGNOSIS — N40.0 BENIGN PROSTATIC HYPERPLASIA WITHOUT LOWER URINARY TRACT SYMPTOMS: ICD-10-CM

## 2021-07-08 LAB
ALBUMIN SERPL-MCNC: 3.6 G/DL (ref 3.4–5)
ALP SERPL-CCNC: 66 U/L (ref 40–150)
ALT SERPL W P-5'-P-CCNC: 23 U/L (ref 0–70)
ANION GAP SERPL CALCULATED.3IONS-SCNC: 8 MMOL/L (ref 3–14)
AST SERPL W P-5'-P-CCNC: 22 U/L (ref 0–45)
BASOPHILS # BLD AUTO: 0 10E9/L (ref 0–0.2)
BASOPHILS NFR BLD AUTO: 0 %
BILIRUB SERPL-MCNC: 0.4 MG/DL (ref 0.2–1.3)
BUN SERPL-MCNC: 12 MG/DL (ref 7–30)
CALCIUM SERPL-MCNC: 9.1 MG/DL (ref 8.5–10.1)
CHLORIDE SERPL-SCNC: 108 MMOL/L (ref 94–109)
CHOLEST SERPL-MCNC: 190 MG/DL
CO2 SERPL-SCNC: 24 MMOL/L (ref 20–32)
CREAT SERPL-MCNC: 0.87 MG/DL (ref 0.66–1.25)
DIFFERENTIAL METHOD BLD: NORMAL
EOSINOPHIL # BLD AUTO: 0.1 10E9/L (ref 0–0.7)
EOSINOPHIL NFR BLD AUTO: 2.7 %
ERYTHROCYTE [DISTWIDTH] IN BLOOD BY AUTOMATED COUNT: 13 % (ref 10–15)
GFR SERPL CREATININE-BSD FRML MDRD: 87 ML/MIN/{1.73_M2}
GLUCOSE SERPL-MCNC: 98 MG/DL (ref 70–99)
HCT VFR BLD AUTO: 49.7 % (ref 40–53)
HDLC SERPL-MCNC: 50 MG/DL
HGB BLD-MCNC: 16.3 G/DL (ref 13.3–17.7)
IMM GRANULOCYTES # BLD: 0 10E9/L (ref 0–0.4)
IMM GRANULOCYTES NFR BLD: 0.6 %
LDLC SERPL CALC-MCNC: 120 MG/DL
LYMPHOCYTES # BLD AUTO: 1.1 10E9/L (ref 0.8–5.3)
LYMPHOCYTES NFR BLD AUTO: 21.9 %
MCH RBC QN AUTO: 31.8 PG (ref 26.5–33)
MCHC RBC AUTO-ENTMCNC: 32.8 G/DL (ref 31.5–36.5)
MCV RBC AUTO: 97 FL (ref 78–100)
MONOCYTES # BLD AUTO: 0.5 10E9/L (ref 0–1.3)
MONOCYTES NFR BLD AUTO: 10 %
NEUTROPHILS # BLD AUTO: 3.1 10E9/L (ref 1.6–8.3)
NEUTROPHILS NFR BLD AUTO: 64.8 %
NONHDLC SERPL-MCNC: 140 MG/DL
NRBC # BLD AUTO: 0 10*3/UL
NRBC BLD AUTO-RTO: 0 /100
PLATELET # BLD AUTO: 270 10E9/L (ref 150–450)
POTASSIUM SERPL-SCNC: 4 MMOL/L (ref 3.4–5.3)
PROT SERPL-MCNC: 7.4 G/DL (ref 6.8–8.8)
PSA SERPL-ACNC: 2.56 UG/L (ref 0–4)
RBC # BLD AUTO: 5.12 10E12/L (ref 4.4–5.9)
SODIUM SERPL-SCNC: 139 MMOL/L (ref 133–144)
TRIGL SERPL-MCNC: 104 MG/DL
TSH SERPL DL<=0.005 MIU/L-ACNC: 1.97 MU/L (ref 0.4–4)
WBC # BLD AUTO: 4.8 10E9/L (ref 4–11)

## 2021-07-08 ASSESSMENT — PATIENT HEALTH QUESTIONNAIRE - PHQ9
5. POOR APPETITE OR OVEREATING: SEVERAL DAYS
SUM OF ALL RESPONSES TO PHQ QUESTIONS 1-9: 4

## 2021-07-08 ASSESSMENT — ANXIETY QUESTIONNAIRES
IF YOU CHECKED OFF ANY PROBLEMS ON THIS QUESTIONNAIRE, HOW DIFFICULT HAVE THESE PROBLEMS MADE IT FOR YOU TO DO YOUR WORK, TAKE CARE OF THINGS AT HOME, OR GET ALONG WITH OTHER PEOPLE: SOMEWHAT DIFFICULT
GAD7 TOTAL SCORE: 4
6. BECOMING EASILY ANNOYED OR IRRITABLE: SEVERAL DAYS
2. NOT BEING ABLE TO STOP OR CONTROL WORRYING: NOT AT ALL
5. BEING SO RESTLESS THAT IT IS HARD TO SIT STILL: NOT AT ALL
3. WORRYING TOO MUCH ABOUT DIFFERENT THINGS: SEVERAL DAYS
1. FEELING NERVOUS, ANXIOUS, OR ON EDGE: SEVERAL DAYS
7. FEELING AFRAID AS IF SOMETHING AWFUL MIGHT HAPPEN: NOT AT ALL

## 2021-07-08 ASSESSMENT — MIFFLIN-ST. JEOR: SCORE: 1510.59

## 2021-07-08 NOTE — NURSING NOTE
"ROOM:3    Preferred Name: Harlan     71 year old  Chief Complaint   Patient presents with     Physical       Blood pressure (!) 146/87, pulse 71, temperature 98.7  F (37.1  C), temperature source Oral, height 1.727 m (5' 8\"), weight 78.1 kg (172 lb 3.2 oz), SpO2 98 %. Body mass index is 26.18 kg/m .      Patient Active Problem List   Diagnosis     Carotid artery dissection (H)     History of TIA (transient ischemic attack) and stroke     Carly syndrome     Stroke (H)       Wt Readings from Last 2 Encounters:   21 78.1 kg (172 lb 3.2 oz)   21 79.4 kg (175 lb)     BP Readings from Last 3 Encounters:   21 (!) 146/87   21 (!) 159/92   20 134/86       Allergies   Allergen Reactions     Reglan [Metoclopramide] Other (See Comments)     [\"Given for diagnosis of migraine, later disproved\"]   Dystonic reaction. Resolved with benadryl        Current Outpatient Medications   Medication     sildenafil (VIAGRA) 50 MG tablet     tamsulosin (FLOMAX) 0.4 MG capsule     atorvastatin (LIPITOR) 40 MG tablet     fluticasone (FLONASE) 50 MCG/ACT nasal spray     omeprazole 20 MG tablet     tamsulosin (FLOMAX) 0.4 MG capsule     terbinafine (LAMISIL) 1 % cream     No current facility-administered medications for this visit.        Social History     Tobacco Use     Smoking status: Former Smoker     Quit date: 1983     Years since quittin.4     Smokeless tobacco: Never Used     Tobacco comment: does not vape    Substance Use Topics     Alcohol use: Yes     Frequency: 2-3 times a week     Drinks per session: 1 or 2     Binge frequency: Never     Comment: beer 2 daily     Drug use: No       Honoring Choices - Health Care Directive Guide offered to patient at time of visit.    Health Maintenance Due   Topic Date Due     ADVANCE CARE PLANNING  Never done     HEPATITIS C SCREENING  Never done     ZOSTER IMMUNIZATION (1 of 2) Never done     Pneumococcal Vaccine: 65+ Years (1 of 1 - PPSV23) Never done     " MEDICARE ANNUAL WELLNESS VISIT  07/30/2016     LIPID  11/19/2019     FALL RISK ASSESSMENT  01/20/2021       Immunization History   Administered Date(s) Administered     COVID-19,PF,Moderna 02/12/2021, 03/12/2021     Tdap (Adacel,Boostrix) 04/15/2014       No results found for: PAP      Recent Labs   Lab Test 01/28/15  0710 11/19/14  0430 11/17/14 2041   A1C  --  5.8  --    LDL  --  104  --    HDL  --  47  --    TRIG  --  100  --    ALT  --   --  34   CR 0.88 0.93 0.95   GFRESTIMATED 87 82 79   GFRESTBLACK >90   GFR Calc   >90   GFR Calc   >90   GFR Calc     ALBUMIN  --   --  3.6   POTASSIUM  --  4.3 3.7       PHQ-2 ( 1999 Pfizer) 7/8/2021 4/7/2021   Q1: Little interest or pleasure in doing things 0 0   Q2: Feeling down, depressed or hopeless 1 0   PHQ-2 Score 1 0   Q1: Little interest or pleasure in doing things - -   Q2: Feeling down, depressed or hopeless - -   PHQ-2 Score - -       PHQ-9 SCORE 1/6/2020 7/8/2021   PHQ-9 Total Score 3 4       SAKSHI-7 SCORE 1/6/2020 7/8/2021   Total Score 2 4       No flowsheet data found.      Paty Null CMA  July 8, 2021 1:59 PM
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
## 2021-07-09 ASSESSMENT — ANXIETY QUESTIONNAIRES: GAD7 TOTAL SCORE: 4

## 2021-07-20 ENCOUNTER — THERAPY VISIT (OUTPATIENT)
Dept: PHYSICAL THERAPY | Facility: CLINIC | Age: 71
End: 2021-07-20
Attending: NURSE PRACTITIONER
Payer: COMMERCIAL

## 2021-07-20 DIAGNOSIS — G89.29 CHRONIC PAIN OF LEFT KNEE: ICD-10-CM

## 2021-07-20 DIAGNOSIS — M25.562 CHRONIC PAIN OF LEFT KNEE: ICD-10-CM

## 2021-07-20 PROCEDURE — 97161 PT EVAL LOW COMPLEX 20 MIN: CPT | Mod: GP | Performed by: PHYSICAL THERAPIST

## 2021-07-20 PROCEDURE — 97110 THERAPEUTIC EXERCISES: CPT | Mod: GP | Performed by: PHYSICAL THERAPIST

## 2021-07-20 ASSESSMENT — ACTIVITIES OF DAILY LIVING (ADL)
WALK: ACTIVITY IS MINIMALLY DIFFICULT
WEAKNESS: I DO NOT HAVE THE SYMPTOM
RISE FROM A CHAIR: ACTIVITY IS MINIMALLY DIFFICULT
SIT WITH YOUR KNEE BENT: ACTIVITY IS MINIMALLY DIFFICULT
STAND: ACTIVITY IS MINIMALLY DIFFICULT
SQUAT: ACTIVITY IS SOMEWHAT DIFFICULT
PAIN: THE SYMPTOM AFFECTS MY ACTIVITY SLIGHTLY
GO DOWN STAIRS: ACTIVITY IS MINIMALLY DIFFICULT
LIMPING: I DO NOT HAVE THE SYMPTOM
HOW_WOULD_YOU_RATE_THE_CURRENT_FUNCTION_OF_YOUR_KNEE_DURING_YOUR_USUAL_DAILY_ACTIVITIES_ON_A_SCALE_FROM_0_TO_100_WITH_100_BEING_YOUR_LEVEL_OF_KNEE_FUNCTION_PRIOR_TO_YOUR_INJURY_AND_0_BEING_THE_INABILITY_TO_PERFORM_ANY_OF_YOUR_USUAL_DAILY_ACTIVITIES?: 95
KNEE_ACTIVITY_OF_DAILY_LIVING_SUM: 58
AS_A_RESULT_OF_YOUR_KNEE_INJURY,_HOW_WOULD_YOU_RATE_YOUR_CURRENT_LEVEL_OF_DAILY_ACTIVITY?: NEARLY NORMAL
GIVING WAY, BUCKLING OR SHIFTING OF KNEE: I DO NOT HAVE THE SYMPTOM
STIFFNESS: THE SYMPTOM AFFECTS MY ACTIVITY SLIGHTLY
RAW_SCORE: 58
KNEEL ON THE FRONT OF YOUR KNEE: ACTIVITY IS MINIMALLY DIFFICULT
KNEE_ACTIVITY_OF_DAILY_LIVING_SCORE: 82.86
HOW_WOULD_YOU_RATE_THE_OVERALL_FUNCTION_OF_YOUR_KNEE_DURING_YOUR_USUAL_DAILY_ACTIVITIES?: NEARLY NORMAL
GO UP STAIRS: ACTIVITY IS NOT DIFFICULT
SWELLING: I DO NOT HAVE THE SYMPTOM

## 2021-07-20 NOTE — PROGRESS NOTES
Physical Therapy Initial Evaluation  Subjective:  The history is provided by the patient. No  was used.   Patient Health History  Ronald Pearson being seen for Pain in left leg near knee.       Problem occurred: ?????dont know   Pain is reported as 2/10 on pain scale.  General health as reported by patient is good.  Pertinent medical history includes: none.   Red flags:  None as reported by patient.  Medical allergies: other. Other medical allergies details: Meto chlopromide.   Surgeries include:  Other. Other surgery history details: Hernia.        Current occupation is retired.                     Therapist Generated HPI Evaluation  Problem details: Chronic medial knee pain for long time. Worse past 6 months. MD 7/8/21.         Type of problem:  Left knee.    This is a chronic condition.  Condition occurred with:  Degenerative joint disease.    Patient reports pain:  Medial.  Pain is described as aching and is intermittent.  Pain is worse in the A.M..  Since onset symptoms are unchanged.  Associated symptoms:  Catching. Symptoms are exacerbated by certain positions        Barriers include:  None as reported by patient.                        Objective:    Gait:    Gait Type:  Normal   Assistive Devices:  None      Flexibility/Screens:       Lower Extremity:  Decreased left lower extremity flexibility:IT Band and Hamstrings    Decreased right lower extremity flexibility:  IT Band and Hamstrings                                                      Knee Evaluation:  ROM:  AROM: normal  PROM: normal  Strength:  Normal            Ligament Testing:  Normal                Special Tests: Normal      Palpation:    Left knee tenderness present at:  Medial Joint Line    Edema:  Normal    Mobility Testing:  Normal            Functional Testing:          Quad:    Single Leg Squat:  Left:     Femoral IR and significant loss of control  Right:        Bilateral Leg Squat:                General      ROS    Assessment/Plan:    Patient is a 71 year old male with left side knee complaints.    Patient has the following significant findings with corresponding treatment plan.                Diagnosis 1:  L knee mild OA  Pain -  home program  Decreased strength - therapeutic exercise, therapeutic activities and home program  Decreased function - therapeutic activities and home program    Therapy Evaluation Codes:   1) History comprised of:   Personal factors that impact the plan of care:      None.    Comorbidity factors that impact the plan of care are:      None.     Medications impacting care: None.  2) Examination of Body Systems comprised of:   Body structures and functions that impact the plan of care:      Knee.   Activity limitations that impact the plan of care are:      Squatting/kneeling.  3) Clinical presentation characteristics are:   Stable/Uncomplicated.  4) Decision-Making    Low complexity using standardized patient assessment instrument and/or measureable assessment of functional outcome.  Cumulative Therapy Evaluation is: Low complexity.    Previous and current functional limitations:  (See Goal Flow Sheet for this information)    Short term and Long term goals: (See Goal Flow Sheet for this information)     Communication ability:  Patient appears to be able to clearly communicate and understand verbal and written communication and follow directions correctly.  Treatment Explanation - The following has been discussed with the patient:   RX ordered/plan of care  Anticipated outcomes  Possible risks and side effects  This patient would benefit from PT intervention to resume normal activities.   Rehab potential is excellent.    Frequency:  1 X week, once daily  Duration:  1x only  Discharge Plan:  Achieve all LTG.  Independent in home treatment program.  Reach maximal therapeutic benefit.    Please refer to the daily flowsheet for treatment today, total treatment time and time spent performing 1:1  timed codes.

## 2021-08-03 ENCOUNTER — PRE VISIT (OUTPATIENT)
Dept: UROLOGY | Facility: CLINIC | Age: 71
End: 2021-08-03

## 2021-08-20 ENCOUNTER — VIRTUAL VISIT (OUTPATIENT)
Dept: PSYCHOLOGY | Facility: CLINIC | Age: 71
End: 2021-08-20
Attending: NURSE PRACTITIONER
Payer: COMMERCIAL

## 2021-08-20 DIAGNOSIS — F41.9 ANXIETY DISORDER, UNSPECIFIED TYPE: Primary | ICD-10-CM

## 2021-08-20 PROCEDURE — 90834 PSYTX W PT 45 MINUTES: CPT | Mod: 95

## 2021-08-20 ASSESSMENT — ANXIETY QUESTIONNAIRES
GAD7 TOTAL SCORE: 4
2. NOT BEING ABLE TO STOP OR CONTROL WORRYING: NOT AT ALL
5. BEING SO RESTLESS THAT IT IS HARD TO SIT STILL: SEVERAL DAYS
6. BECOMING EASILY ANNOYED OR IRRITABLE: SEVERAL DAYS
7. FEELING AFRAID AS IF SOMETHING AWFUL MIGHT HAPPEN: NOT AT ALL
7. FEELING AFRAID AS IF SOMETHING AWFUL MIGHT HAPPEN: NOT AT ALL
3. WORRYING TOO MUCH ABOUT DIFFERENT THINGS: SEVERAL DAYS
GAD7 TOTAL SCORE: 4
GAD7 TOTAL SCORE: 4
4. TROUBLE RELAXING: NOT AT ALL
8. IF YOU CHECKED OFF ANY PROBLEMS, HOW DIFFICULT HAVE THESE MADE IT FOR YOU TO DO YOUR WORK, TAKE CARE OF THINGS AT HOME, OR GET ALONG WITH OTHER PEOPLE?: SOMEWHAT DIFFICULT
1. FEELING NERVOUS, ANXIOUS, OR ON EDGE: SEVERAL DAYS

## 2021-08-20 ASSESSMENT — PATIENT HEALTH QUESTIONNAIRE - PHQ9
SUM OF ALL RESPONSES TO PHQ QUESTIONS 1-9: 2
SUM OF ALL RESPONSES TO PHQ QUESTIONS 1-9: 2
10. IF YOU CHECKED OFF ANY PROBLEMS, HOW DIFFICULT HAVE THESE PROBLEMS MADE IT FOR YOU TO DO YOUR WORK, TAKE CARE OF THINGS AT HOME, OR GET ALONG WITH OTHER PEOPLE: SOMEWHAT DIFFICULT

## 2021-08-21 ASSESSMENT — ANXIETY QUESTIONNAIRES: GAD7 TOTAL SCORE: 4

## 2021-08-21 ASSESSMENT — PATIENT HEALTH QUESTIONNAIRE - PHQ9: SUM OF ALL RESPONSES TO PHQ QUESTIONS 1-9: 2

## 2021-08-23 ASSESSMENT — COLUMBIA-SUICIDE SEVERITY RATING SCALE - C-SSRS
1. IN THE PAST MONTH, HAVE YOU WISHED YOU WERE DEAD OR WISHED YOU COULD GO TO SLEEP AND NOT WAKE UP?: NO
1. IN THE PAST MONTH, HAVE YOU WISHED YOU WERE DEAD OR WISHED YOU COULD GO TO SLEEP AND NOT WAKE UP?: NO
4. HAVE YOU HAD THESE THOUGHTS AND HAD SOME INTENTION OF ACTING ON THEM?: NO
5. HAVE YOU STARTED TO WORK OUT OR WORKED OUT THE DETAILS OF HOW TO KILL YOURSELF? DO YOU INTEND TO CARRY OUT THIS PLAN?: NO
3. HAVE YOU BEEN THINKING ABOUT HOW YOU MIGHT KILL YOURSELF?: NO
4. HAVE YOU HAD THESE THOUGHTS AND HAD SOME INTENTION OF ACTING ON THEM?: NO
2. HAVE YOU ACTUALLY HAD ANY THOUGHTS OF KILLING YOURSELF LIFETIME?: NO
5. HAVE YOU STARTED TO WORK OUT OR WORKED OUT THE DETAILS OF HOW TO KILL YOURSELF? DO YOU INTEND TO CARRY OUT THIS PLAN?: NO
2. HAVE YOU ACTUALLY HAD ANY THOUGHTS OF KILLING YOURSELF?: NO
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
## 2021-08-27 ENCOUNTER — VIRTUAL VISIT (OUTPATIENT)
Dept: PSYCHOLOGY | Facility: CLINIC | Age: 71
End: 2021-08-27
Payer: COMMERCIAL

## 2021-08-27 DIAGNOSIS — F41.1 GAD (GENERALIZED ANXIETY DISORDER): Primary | ICD-10-CM

## 2021-08-27 PROCEDURE — 90791 PSYCH DIAGNOSTIC EVALUATION: CPT | Mod: 95
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
## 2021-09-03 ENCOUNTER — VIRTUAL VISIT (OUTPATIENT)
Dept: PSYCHOLOGY | Facility: CLINIC | Age: 71
End: 2021-09-03
Payer: COMMERCIAL

## 2021-09-03 DIAGNOSIS — F41.1 GAD (GENERALIZED ANXIETY DISORDER): Primary | ICD-10-CM

## 2021-09-03 PROCEDURE — 90834 PSYTX W PT 45 MINUTES: CPT | Mod: 95
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
## 2021-09-10 ENCOUNTER — VIRTUAL VISIT (OUTPATIENT)
Dept: PSYCHOLOGY | Facility: CLINIC | Age: 71
End: 2021-09-10
Payer: COMMERCIAL

## 2021-09-10 DIAGNOSIS — F41.1 GAD (GENERALIZED ANXIETY DISORDER): Primary | ICD-10-CM

## 2021-09-10 PROCEDURE — 90834 PSYTX W PT 45 MINUTES: CPT | Mod: 95

## 2021-09-11 ENCOUNTER — HEALTH MAINTENANCE LETTER (OUTPATIENT)
Age: 71
End: 2021-09-11
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
## 2021-09-17 ENCOUNTER — VIRTUAL VISIT (OUTPATIENT)
Dept: PSYCHOLOGY | Facility: CLINIC | Age: 71
End: 2021-09-17
Payer: COMMERCIAL

## 2021-09-17 DIAGNOSIS — F41.1 GAD (GENERALIZED ANXIETY DISORDER): Primary | ICD-10-CM

## 2021-09-17 PROCEDURE — 90834 PSYTX W PT 45 MINUTES: CPT | Mod: 95

## 2021-09-23 ENCOUNTER — VIRTUAL VISIT (OUTPATIENT)
Dept: PSYCHOLOGY | Facility: CLINIC | Age: 71
End: 2021-09-23
Payer: COMMERCIAL

## 2021-09-23 DIAGNOSIS — F41.1 GAD (GENERALIZED ANXIETY DISORDER): Primary | ICD-10-CM

## 2021-09-23 PROCEDURE — 90834 PSYTX W PT 45 MINUTES: CPT | Mod: 95

## 2021-10-07 ENCOUNTER — VIRTUAL VISIT (OUTPATIENT)
Dept: PSYCHOLOGY | Facility: CLINIC | Age: 71
End: 2021-10-07
Payer: COMMERCIAL

## 2021-10-07 DIAGNOSIS — F41.1 GAD (GENERALIZED ANXIETY DISORDER): Primary | ICD-10-CM

## 2021-10-07 PROCEDURE — 90834 PSYTX W PT 45 MINUTES: CPT | Mod: 95
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
## 2021-10-21 ENCOUNTER — VIRTUAL VISIT (OUTPATIENT)
Dept: PSYCHOLOGY | Facility: CLINIC | Age: 71
End: 2021-10-21
Payer: COMMERCIAL

## 2021-10-21 DIAGNOSIS — F41.1 GAD (GENERALIZED ANXIETY DISORDER): Primary | ICD-10-CM

## 2021-10-21 PROCEDURE — 90834 PSYTX W PT 45 MINUTES: CPT | Mod: 95
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
## 2021-10-29 ENCOUNTER — VIRTUAL VISIT (OUTPATIENT)
Dept: PSYCHOLOGY | Facility: CLINIC | Age: 71
End: 2021-10-29
Payer: COMMERCIAL

## 2021-10-29 DIAGNOSIS — F41.1 GAD (GENERALIZED ANXIETY DISORDER): Primary | ICD-10-CM

## 2021-10-29 PROCEDURE — 90834 PSYTX W PT 45 MINUTES: CPT | Mod: 95
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
## 2021-11-04 ENCOUNTER — VIRTUAL VISIT (OUTPATIENT)
Dept: PSYCHOLOGY | Facility: CLINIC | Age: 71
End: 2021-11-04
Payer: COMMERCIAL

## 2021-11-04 DIAGNOSIS — F41.1 GAD (GENERALIZED ANXIETY DISORDER): Primary | ICD-10-CM

## 2021-11-04 PROCEDURE — 90834 PSYTX W PT 45 MINUTES: CPT | Mod: 95
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
## 2021-11-09 ENCOUNTER — IMMUNIZATION (OUTPATIENT)
Dept: NURSING | Facility: CLINIC | Age: 71
End: 2021-11-09
Payer: COMMERCIAL

## 2021-11-09 PROCEDURE — 91306 COVID-19,PF,MODERNA (18+ YRS BOOSTER .25ML): CPT

## 2021-11-09 PROCEDURE — 0064A COVID-19,PF,MODERNA (18+ YRS BOOSTER .25ML): CPT

## 2021-11-10 ENCOUNTER — VIRTUAL VISIT (OUTPATIENT)
Dept: PSYCHOLOGY | Facility: CLINIC | Age: 71
End: 2021-11-10
Payer: COMMERCIAL

## 2021-11-10 DIAGNOSIS — F41.1 GAD (GENERALIZED ANXIETY DISORDER): Primary | ICD-10-CM

## 2021-11-10 PROCEDURE — 90834 PSYTX W PT 45 MINUTES: CPT | Mod: 95

## 2021-11-10 ASSESSMENT — ANXIETY QUESTIONNAIRES
7. FEELING AFRAID AS IF SOMETHING AWFUL MIGHT HAPPEN: NOT AT ALL
GAD7 TOTAL SCORE: 0
GAD7 TOTAL SCORE: 0
7. FEELING AFRAID AS IF SOMETHING AWFUL MIGHT HAPPEN: NOT AT ALL
3. WORRYING TOO MUCH ABOUT DIFFERENT THINGS: NOT AT ALL
6. BECOMING EASILY ANNOYED OR IRRITABLE: NOT AT ALL
4. TROUBLE RELAXING: NOT AT ALL
2. NOT BEING ABLE TO STOP OR CONTROL WORRYING: NOT AT ALL
8. IF YOU CHECKED OFF ANY PROBLEMS, HOW DIFFICULT HAVE THESE MADE IT FOR YOU TO DO YOUR WORK, TAKE CARE OF THINGS AT HOME, OR GET ALONG WITH OTHER PEOPLE?: NOT DIFFICULT AT ALL
GAD7 TOTAL SCORE: 0
5. BEING SO RESTLESS THAT IT IS HARD TO SIT STILL: NOT AT ALL
1. FEELING NERVOUS, ANXIOUS, OR ON EDGE: NOT AT ALL

## 2021-11-10 ASSESSMENT — PATIENT HEALTH QUESTIONNAIRE - PHQ9
10. IF YOU CHECKED OFF ANY PROBLEMS, HOW DIFFICULT HAVE THESE PROBLEMS MADE IT FOR YOU TO DO YOUR WORK, TAKE CARE OF THINGS AT HOME, OR GET ALONG WITH OTHER PEOPLE: NOT DIFFICULT AT ALL
SUM OF ALL RESPONSES TO PHQ QUESTIONS 1-9: 0
SUM OF ALL RESPONSES TO PHQ QUESTIONS 1-9: 0
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
## 2021-11-11 ASSESSMENT — PATIENT HEALTH QUESTIONNAIRE - PHQ9: SUM OF ALL RESPONSES TO PHQ QUESTIONS 1-9: 0

## 2021-11-11 ASSESSMENT — ANXIETY QUESTIONNAIRES: GAD7 TOTAL SCORE: 0

## 2021-11-18 ENCOUNTER — VIRTUAL VISIT (OUTPATIENT)
Dept: PSYCHOLOGY | Facility: CLINIC | Age: 71
End: 2021-11-18
Payer: COMMERCIAL

## 2021-11-18 DIAGNOSIS — F41.1 GAD (GENERALIZED ANXIETY DISORDER): Primary | ICD-10-CM

## 2021-11-18 PROCEDURE — 90834 PSYTX W PT 45 MINUTES: CPT | Mod: 95
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
## 2021-11-29 ENCOUNTER — OFFICE VISIT (OUTPATIENT)
Dept: PSYCHOLOGY | Facility: CLINIC | Age: 71
End: 2021-11-29
Payer: COMMERCIAL

## 2021-11-29 DIAGNOSIS — F41.1 GAD (GENERALIZED ANXIETY DISORDER): Primary | ICD-10-CM

## 2021-11-29 PROCEDURE — 90834 PSYTX W PT 45 MINUTES: CPT

## 2021-11-29 ASSESSMENT — ANXIETY QUESTIONNAIRES
6. BECOMING EASILY ANNOYED OR IRRITABLE: NOT AT ALL
2. NOT BEING ABLE TO STOP OR CONTROL WORRYING: NOT AT ALL
3. WORRYING TOO MUCH ABOUT DIFFERENT THINGS: SEVERAL DAYS
1. FEELING NERVOUS, ANXIOUS, OR ON EDGE: SEVERAL DAYS
4. TROUBLE RELAXING: NOT AT ALL
7. FEELING AFRAID AS IF SOMETHING AWFUL MIGHT HAPPEN: NOT AT ALL
5. BEING SO RESTLESS THAT IT IS HARD TO SIT STILL: NOT AT ALL
GAD7 TOTAL SCORE: 2
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
## 2021-11-30 ASSESSMENT — PATIENT HEALTH QUESTIONNAIRE - PHQ9: SUM OF ALL RESPONSES TO PHQ QUESTIONS 1-9: 2

## 2021-11-30 ASSESSMENT — ANXIETY QUESTIONNAIRES: GAD7 TOTAL SCORE: 2

## 2021-12-08 ENCOUNTER — VIRTUAL VISIT (OUTPATIENT)
Dept: PSYCHOLOGY | Facility: CLINIC | Age: 71
End: 2021-12-08
Payer: COMMERCIAL

## 2021-12-08 DIAGNOSIS — F41.1 GAD (GENERALIZED ANXIETY DISORDER): Primary | ICD-10-CM

## 2021-12-08 PROCEDURE — 90834 PSYTX W PT 45 MINUTES: CPT | Mod: 95
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
## 2021-12-20 ENCOUNTER — OFFICE VISIT (OUTPATIENT)
Dept: PSYCHOLOGY | Facility: CLINIC | Age: 71
End: 2021-12-20
Payer: COMMERCIAL

## 2021-12-20 ENCOUNTER — TELEPHONE (OUTPATIENT)
Dept: OPHTHALMOLOGY | Facility: CLINIC | Age: 71
End: 2021-12-20
Payer: COMMERCIAL

## 2021-12-20 DIAGNOSIS — F41.1 GAD (GENERALIZED ANXIETY DISORDER): Primary | ICD-10-CM

## 2021-12-20 PROCEDURE — 90834 PSYTX W PT 45 MINUTES: CPT

## 2021-12-20 NOTE — TELEPHONE ENCOUNTER
Faxed Harlan's eyeglass rx to [facility]. Fax Number [phone]    Denise Wren Communication Facilitator on 12/20/2021 at 10:39 AM
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
## 2021-12-20 NOTE — TELEPHONE ENCOUNTER
[facility] Call Center    Phone Message    May a detailed message be left on voicemail: yes     Reason for Call: Other: Please fax most recent vision prescription to [facility] in [place]. Fax # [phone]     Action Taken: Message routed to:  [facility] E[facility]    Travel Screening: Not Applicable

## 2021-12-23 ENCOUNTER — PRE VISIT (OUTPATIENT)
Dept: UROLOGY | Facility: CLINIC | Age: 71
End: 2021-12-23
Payer: COMMERCIAL

## 2021-12-23 NOTE — TELEPHONE ENCOUNTER
Reason for visit: follow up      Dx/Hx/Sx: BPH w/ LUTS, ED    Records/imaging/labs/orders: in epic     At Rooming: video visit

## 2021-12-28 ENCOUNTER — VIRTUAL VISIT (OUTPATIENT)
Dept: UROLOGY | Facility: CLINIC | Age: 71
End: 2021-12-28
Payer: COMMERCIAL

## 2021-12-28 DIAGNOSIS — R39.12 BENIGN PROSTATIC HYPERPLASIA WITH WEAK URINARY STREAM: ICD-10-CM

## 2021-12-28 DIAGNOSIS — N40.1 BENIGN PROSTATIC HYPERPLASIA WITH WEAK URINARY STREAM: ICD-10-CM

## 2021-12-28 DIAGNOSIS — N52.9 ERECTILE DYSFUNCTION, UNSPECIFIED ERECTILE DYSFUNCTION TYPE: Primary | ICD-10-CM

## 2021-12-28 PROBLEM — K21.00 GERD WITH ESOPHAGITIS: Status: ACTIVE | Noted: 2017-10-05

## 2021-12-28 PROBLEM — E78.00 ELEVATED CHOLESTEROL: Status: ACTIVE | Noted: 2017-09-07

## 2021-12-28 PROBLEM — M25.519 PAIN IN SHOULDER: Status: ACTIVE | Noted: 2018-11-01

## 2021-12-28 PROCEDURE — 99213 OFFICE O/P EST LOW 20 MIN: CPT | Mod: 95 | Performed by: NURSE PRACTITIONER

## 2021-12-28 RX ORDER — SILDENAFIL 50 MG/1
50 TABLET, FILM COATED ORAL DAILY PRN
Qty: 30 TABLET | Refills: 3 | Status: SHIPPED | OUTPATIENT
Start: 2021-12-28 | End: 2023-01-04

## 2021-12-28 ASSESSMENT — PAIN SCALES - GENERAL: PAINLEVEL: NO PAIN (0)

## 2021-12-28 NOTE — LETTER
12/28/2021       RE: Ronald Pearson  [address] [facility] [address]     Dear Colleague,    Thank you for referring your patient, Ronald Pearson, to the [facility] UROLOGY [facility] [place] at [facility]. Please see a copy of my visit note below.    Harlan is a 71 year old who is being evaluated via a billable video visit.      How would you like to obtain your AVS? MyChart  If the video visit is dropped, the invitation should be resent by: Text to cell phone: [phone]  Will anyone else be joining your video visit? No    Video Start Time: 9:05 AM  Video-Visit Details    Type of service:  Video Visit    Video End Time: 9:25 AM    Originating Location (pt. Location): Home    Distant Location (provider location):  [facility]OLOG[facility] [place]     Platform used for Video Visit: Candie       Ronald Pearson complains of   Chief Complaint   Patient presents with     RECHECK     BPH/LUTS       Assessment/Plan:  71 year old male with a history of BPH with LUTS (weak stream, post-void dribbling, urgency and a few instances of UUI). Obstructive LUTS have improved with Flomax, but continues to have some urgency which is not entirely bothersome. We discussed the potential benefit of adding an anticholinergic, but he would like to defer. Acknowledges caffeine impact on these symptoms. Regarding ED, has had variable effects with sildenafil, but would like to continue using these and needs a renewed prescription.   -Continue Flomax 0.4 mg daily.  -Continue sildenafil 50 mg as needed for ED. He will split these in half and take 25 mg as he feels is appropriate.   -Will plan for 6 month follow up in-person to update MICHELET, with a PSA drawn beforehand.     Darlene Sung, CNP  Department of Urology      Subjective:   71 year old male with a history of TIA and BPH who presents for follow up. I last saw him in 06/2021, at which time he  reported weak stream, post-void dribbling, urgency and a few instances of UUI. Nocturia worse after drinking a few beers before bed. Had previously taken Flomax, but had stopped it in preparation for cataract surgery. This was resumed. We also discussed ED treatment options, but he opted to monitor at that time. PVR was low at 28 mL. Last PSA was 2.56 in July    Today, he states that his stream is stronger and the post-void dribbling has improved with Flomax. He does continue to have some urgency, though he does not feel this is bothersome enough to warrant a change to his current regimen.     Regarding ED, he has been using an existing prescription of sildenafil and has had variable results. Sometimes 25 mg alone works well, but sometimes 50 mg does not. He can sometimes get no erection and just a headache. Had one instance where he could not get a good erection up to 1.5 hours after taking a dose, but later that night he developed a very firm erection spontaneously. He does want to continue to use the sildenafil and requests a renewed prescription.       Objective:     Exam:   Patient is a 71 year old male   No vital signs obtained due to virtual visit.  General Appearance: Well groomed, hygenic  Respiratory: No cough, no respiratory distress or labored breathing  Musculoskeletal: Grossly normal with no gross deficits  Skin: No discoloration or apparent rashes  Neurologic: No tremors  Psychiatric: Alert and oriented  Further examination is deferred due to the nature of our visit.

## 2021-12-28 NOTE — PROGRESS NOTES
Harlan is a 71 year old who is being evaluated via a billable video visit.      How would you like to obtain your AVS? MyChart  If the video visit is dropped, the invitation should be resent by: Text to cell phone: [phone]  Will anyone else be joining your video visit? No    Video Start Time: 9:05 AM  Video-Visit Details    Type of service:  Video Visit    Video End Time: 9:25 AM    Originating Location (pt. Location): Home    Distant Location (provider location):  [facility] [place]     Platform used for Video Visit: Candie Cardenas REID Pearson complains of   Chief Complaint   Patient presents with     RECHECK     BPH/LUTS       Assessment/Plan:  71 year old male with a history of BPH with LUTS (weak stream, post-void dribbling, urgency and a few instances of UUI). Obstructive LUTS have improved with Flomax, but continues to have some urgency which is not entirely bothersome. We discussed the potential benefit of adding an anticholinergic, but he would like to defer. Acknowledges caffeine impact on these symptoms. Regarding ED, has had variable effects with sildenafil, but would like to continue using these and needs a renewed prescription.   -Continue Flomax 0.4 mg daily.  -Continue sildenafil 50 mg as needed for ED. He will split these in half and take 25 mg as he feels is appropriate.   -Will plan for 6 month follow up in-person to update MICHELET, with a PSA drawn beforehand.     Darlene Sung, CNP  Department of Urology      Subjective:   71 year old male with a history of TIA and BPH who presents for follow up. I last saw him in 06/2021, at which time he reported weak stream, post-void dribbling, urgency and a few instances of UUI. Nocturia worse after drinking a few beers before bed. Had previously taken Flomax, but had stopped it in preparation for cataract surgery. This was resumed. We also discussed ED treatment options, but he opted to monitor at that time. PVR was low at  28 mL. Last PSA was 2.56 in July    Today, he states that his stream is stronger and the post-void dribbling has improved with Flomax. He does continue to have some urgency, though he does not feel this is bothersome enough to warrant a change to his current regimen.     Regarding ED, he has been using an existing prescription of sildenafil and has had variable results. Sometimes 25 mg alone works well, but sometimes 50 mg does not. He can sometimes get no erection and just a headache. Had one instance where he could not get a good erection up to 1.5 hours after taking a dose, but later that night he developed a very firm erection spontaneously. He does want to continue to use the sildenafil and requests a renewed prescription.       Objective:     Exam:   Patient is a 71 year old male   No vital signs obtained due to virtual visit.  General Appearance: Well groomed, hygenic  Respiratory: No cough, no respiratory distress or labored breathing  Musculoskeletal: Grossly normal with no gross deficits  Skin: No discoloration or apparent rashes  Neurologic: No tremors  Psychiatric: Alert and oriented  Further examination is deferred due to the nature of our visit.

## 2021-12-28 NOTE — PATIENT INSTRUCTIONS
UROLOGY CLINIC VISIT PATIENT INSTRUCTIONS    -Continue the Flomax daily and sildenafil as needed for ED. OK to split the sildenafil and take 25 mg per dose.  -Follow up with me in 6 months for an in-person visit with a PSA beforehand.     If you have any issues, questions or concerns in the meantime, do not hesitate to contact us at [phone] or via [place].       Darlene Sung, CNP  Department of Urology

## 2021-12-29 ENCOUNTER — VIRTUAL VISIT (OUTPATIENT)
Dept: PSYCHOLOGY | Facility: CLINIC | Age: 71
End: 2021-12-29
Payer: COMMERCIAL

## 2021-12-29 DIAGNOSIS — F41.1 GAD (GENERALIZED ANXIETY DISORDER): Primary | ICD-10-CM

## 2021-12-29 PROCEDURE — 90834 PSYTX W PT 45 MINUTES: CPT | Mod: 95
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
## 2022-01-06 ENCOUNTER — VIRTUAL VISIT (OUTPATIENT)
Dept: PSYCHOLOGY | Facility: CLINIC | Age: 72
End: 2022-01-06
Payer: COMMERCIAL

## 2022-01-06 DIAGNOSIS — F41.1 GAD (GENERALIZED ANXIETY DISORDER): Primary | ICD-10-CM

## 2022-01-06 PROCEDURE — 90834 PSYTX W PT 45 MINUTES: CPT | Mod: 95
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
## 2022-01-24 ENCOUNTER — VIRTUAL VISIT (OUTPATIENT)
Dept: PSYCHOLOGY | Facility: CLINIC | Age: 72
End: 2022-01-24
Payer: COMMERCIAL

## 2022-01-24 DIAGNOSIS — F41.1 GAD (GENERALIZED ANXIETY DISORDER): Primary | ICD-10-CM

## 2022-01-24 PROCEDURE — 90834 PSYTX W PT 45 MINUTES: CPT | Mod: 95
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
## 2022-02-02 ENCOUNTER — VIRTUAL VISIT (OUTPATIENT)
Dept: PSYCHOLOGY | Facility: CLINIC | Age: 72
End: 2022-02-02
Payer: COMMERCIAL

## 2022-02-02 DIAGNOSIS — F41.1 GAD (GENERALIZED ANXIETY DISORDER): Primary | ICD-10-CM

## 2022-02-02 PROCEDURE — 90834 PSYTX W PT 45 MINUTES: CPT | Mod: 95
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
## 2022-02-09 ENCOUNTER — MYC MEDICAL ADVICE (OUTPATIENT)
Dept: OPHTHALMOLOGY | Facility: CLINIC | Age: 72
End: 2022-02-09
Payer: COMMERCIAL

## 2022-02-10 ENCOUNTER — OFFICE VISIT (OUTPATIENT)
Dept: OPHTHALMOLOGY | Facility: CLINIC | Age: 72
End: 2022-02-10
Attending: OPHTHALMOLOGY
Payer: COMMERCIAL

## 2022-02-10 DIAGNOSIS — H43.393 VITREOUS SYNERESIS OF BOTH EYES: Primary | ICD-10-CM

## 2022-02-10 PROCEDURE — G0463 HOSPITAL OUTPT CLINIC VISIT: HCPCS | Mod: 25

## 2022-02-10 PROCEDURE — 99207 FUNDUS PHOTOS OU (BOTH EYES): CPT | Mod: 26 | Performed by: OPHTHALMOLOGY

## 2022-02-10 PROCEDURE — 92250 FUNDUS PHOTOGRAPHY W/I&R: CPT | Performed by: OPHTHALMOLOGY

## 2022-02-10 PROCEDURE — 92134 CPTRZ OPH DX IMG PST SGM RTA: CPT | Performed by: OPHTHALMOLOGY

## 2022-02-10 PROCEDURE — 99214 OFFICE O/P EST MOD 30 MIN: CPT | Performed by: OPHTHALMOLOGY

## 2022-02-10 ASSESSMENT — CONF VISUAL FIELD
OS_NORMAL: 1
OD_NORMAL: 1

## 2022-02-10 ASSESSMENT — EXTERNAL EXAM - RIGHT EYE: OD_EXAM: NORMAL

## 2022-02-10 ASSESSMENT — REFRACTION_WEARINGRX
OS_AXIS: 115
OS_CYLINDER: +0.25
OS_SPHERE: -2.00
OD_CYLINDER: +0.50
SPECS_TYPE: SVL
OD_SPHERE: -2.50
OD_AXIS: 070

## 2022-02-10 ASSESSMENT — VISUAL ACUITY
OS_CC: 20/20
OD_CC: 20/20
OD_CC+: -2
CORRECTION_TYPE: GLASSES
METHOD: SNELLEN - LINEAR

## 2022-02-10 ASSESSMENT — TONOMETRY
IOP_METHOD: TONOPEN
OS_IOP_MMHG: 17
OD_IOP_MMHG: 13

## 2022-02-10 ASSESSMENT — CUP TO DISC RATIO
OD_RATIO: 0.4
OS_RATIO: 0.4

## 2022-02-10 ASSESSMENT — EXTERNAL EXAM - LEFT EYE: OS_EXAM: NORMAL

## 2022-02-10 NOTE — PROGRESS NOTES
CC: flashes and floaters right eye   First tia with me   HPI: patient complaining of New right eye floaters/flashing right eye starting around 5 PM yesterday  One floater few days ago; no eye pain; no changes in vision     Past ocular history: Cataracts both eyes. Patient self-discontinued flomax (for BPH) around 09/2020 secondary to concern with complications regarding cataract surgery. Patient's father reportedly had bleeding associated with cataract surgery, and was on flomax. He is planning to follow up with urology this spring.    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   - Flomax patient   - HLD  PMH: Multiple mini-strokes s/p LICA dissection 11/2014 without residual deficits.    Imaging  Optical Coherence Tomography 02/10/22   Right eye: string of vitreous attached to fovea  Left eye: vitreous off fovea    Assessment & Plan    # Posterior vitreous detachment (PVD) right eye  (primary encounter diagnosis)  No tears on scleral depression   Retina detachment precautions were discussed with the patient (presence or increased in flashes, floaters or a curtain in the visual field) and was asked to return if any of the those occur    #optic nerve heme right eye   Probably related to Posterior vitreous detachment (PVD)  Will observe for now   Will obtain topcon pic of the optic nerve and ONFL     # Cataracts, cortical and nuclear bilateral  Patient was on flomax  becoming visual significance with cortical in visual axis given glare symptoms, vision 20/20  Will repeat BAT testing next follow up   Plan: Observe    # history of Carly syndrome in 2014  He has anisocoria greater in dark with 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    secondary to his left internal carotid dissection in November of 2014 after an MRI was ordered in ER.  Patient with history of neck massage. He was diagnosed with mini-strokes related to the dissection, but has no sequelae from these. He is off on  anticoagulation, and per the patient, the size of his artery has returned to normal.  Discussed that these findings will likely persist over time. If the ptosis becomes bothersome, can consider ptosis repair surgery. He is currently not bothered, so will observe.   Recommend to discussed with primary care physician/ cardiologist regarding future neck massage.    # Myopic astigmatism and Presbyopia- Good vision with current glasses, Takes glasses off to read    Follow up in 3 weeks repeated dilated exam   Sooner as needed   Retina detachment precautions were discussed with the patient (presence or increased in flashes, floaters or a curtain in the visual field) and was asked to return if any of the those occur   topcon pic of the optic nerve and ONFL next follow up      ~~~~~~~~~~~~~~~~~~~~~~~~~~~~~~~~~~   Complete documentation of historical and exam elements from today's encounter can be found in the full encounter summary report (not reduplicated in this progress note).  I personally obtained the chief complaint(s) and history of present illness.  I confirmed and edited as necessary the review of systems, past medical/surgical history, family history, social history, and examination findings as documented by others; and I examined the patient myself.  I personally reviewed the relevant tests, images, and reports as documented above.  I formulated and edited as necessary the assessment and plan and discussed the findings and management plan with the patient and family    Yarely Lamar MD   of Ophthalmology.  Retina Service   Department of Ophthalmology and Visual Neurosciences   [facility]  Phone: [phone]   Fax: [phone]

## 2022-02-10 NOTE — TELEPHONE ENCOUNTER
Spoke to pt at 0809    New right eye floaters/flashing right eye starting around 5 PM yesterday    One floater few days ago    Yesterday new flashing and more floaters--ink lines moving around in vision    Scheduled with Dr. Lamar in same day time slot at 0910 today    Pt aware of date/time/location at [facility]    Arnulfo Kaplan RN 8:14 AM 02/10/22

## 2022-02-10 NOTE — NURSING NOTE
Chief Complaints and History of Present Illnesses   Patient presents with     Floaters     Chief Complaint(s) and History of Present Illness(es)     Floaters     Laterality: right eye    Associated symptoms: flashes.  Negative for peripheral vision loss, headache, photophobia and shade              Comments     Pt here for new flashes and floaters right eye that started yesterday 02/09/2022. 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 Pt notes one floater right eye. Flashes were last seen at 8am. Pt says yesterday there was dark lines in line of vision that are no longer visible.   Pt does mention hitting his eye about 1 month ago and it has been irritated since.   Pt not using drops.   MILLY ROJO, COA 9:21 AM February 10, 2022

## 2022-02-18 ENCOUNTER — VIRTUAL VISIT (OUTPATIENT)
Dept: PSYCHOLOGY | Facility: CLINIC | Age: 72
End: 2022-02-18
Payer: COMMERCIAL

## 2022-02-18 DIAGNOSIS — F41.1 GAD (GENERALIZED ANXIETY DISORDER): Primary | ICD-10-CM

## 2022-02-18 PROCEDURE — 90834 PSYTX W PT 45 MINUTES: CPT | Mod: 95
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
## 2022-02-23 ENCOUNTER — VIRTUAL VISIT (OUTPATIENT)
Dept: PSYCHOLOGY | Facility: CLINIC | Age: 72
End: 2022-02-23
Payer: COMMERCIAL

## 2022-02-23 DIAGNOSIS — H47.023: ICD-10-CM

## 2022-02-23 DIAGNOSIS — H43.393 VITREOUS SYNERESIS OF BOTH EYES: Primary | ICD-10-CM

## 2022-02-23 DIAGNOSIS — F41.1 GAD (GENERALIZED ANXIETY DISORDER): Primary | ICD-10-CM

## 2022-02-23 DIAGNOSIS — H47.029: ICD-10-CM

## 2022-02-23 PROCEDURE — 90834 PSYTX W PT 45 MINUTES: CPT | Mod: 95
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
## 2022-03-03 ENCOUNTER — OFFICE VISIT (OUTPATIENT)
Dept: OPHTHALMOLOGY | Facility: CLINIC | Age: 72
End: 2022-03-03
Attending: OPHTHALMOLOGY
Payer: COMMERCIAL

## 2022-03-03 DIAGNOSIS — H47.023: ICD-10-CM

## 2022-03-03 DIAGNOSIS — H47.029: ICD-10-CM

## 2022-03-03 PROCEDURE — 99207 FUNDUS PHOTOS OU (BOTH EYES): CPT | Mod: 26 | Performed by: OPHTHALMOLOGY

## 2022-03-03 PROCEDURE — 92133 CPTRZD OPH DX IMG PST SGM ON: CPT | Performed by: OPHTHALMOLOGY

## 2022-03-03 PROCEDURE — 92012 INTRM OPH EXAM EST PATIENT: CPT | Performed by: OPHTHALMOLOGY

## 2022-03-03 PROCEDURE — G0463 HOSPITAL OUTPT CLINIC VISIT: HCPCS | Mod: 25

## 2022-03-03 PROCEDURE — 92250 FUNDUS PHOTOGRAPHY W/I&R: CPT | Performed by: OPHTHALMOLOGY

## 2022-03-03 ASSESSMENT — EXTERNAL EXAM - RIGHT EYE: OD_EXAM: NORMAL

## 2022-03-03 ASSESSMENT — CONF VISUAL FIELD
METHOD: COUNTING FINGERS
OD_NORMAL: 1
OS_NORMAL: 1

## 2022-03-03 ASSESSMENT — REFRACTION_WEARINGRX
OS_SPHERE: -2.00
OS_CYLINDER: +0.25
SPECS_TYPE: SVL
OS_AXIS: 115
OD_AXIS: 070
OD_SPHERE: -2.50
OD_CYLINDER: +0.50

## 2022-03-03 ASSESSMENT — EXTERNAL EXAM - LEFT EYE: OS_EXAM: NORMAL

## 2022-03-03 ASSESSMENT — CUP TO DISC RATIO
OS_RATIO: 0.4
OD_RATIO: 0.4

## 2022-03-03 ASSESSMENT — VISUAL ACUITY
CORRECTION_TYPE: GLASSES
OS_CC: 20/20
METHOD: SNELLEN - LINEAR
OD_CC+: -2
OS_CC+: -1
OD_CC: 20/20

## 2022-03-03 ASSESSMENT — TONOMETRY
IOP_METHOD: ICARE
OD_IOP_MMHG: 11
OS_IOP_MMHG: 12

## 2022-03-03 NOTE — PROGRESS NOTES
CC: flashes and floaters right eye   Interim: reports improvement of the flashes and floaters right eye. No new complains   HPI: patient complaining of New right eye floaters/flashing right eye starting around 5 PM yesterday  One floater few days ago; no eye pain; no changes in vision     Past ocular history: Cataracts both eyes. Patient self-discontinued flomax (for BPH) around 09/2020 secondary to concern with complications regarding cataract surgery. Patient's father reportedly had bleeding associated with cataract surgery, and was on flomax. He is planning to follow up with urology this spring.    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   - Flomax patient   - HLD  PMH: Multiple mini-strokes s/p LICA dissection 11/2014 without residual deficits.    Imaging  ONFL 03/03/22   Right eye: normal   Left eye: normal     Optical Coherence Tomography 02/10/22   Right eye: string of vitreous attached to fovea  Left eye: vitreous off fovea    Assessment & Plan    # Posterior vitreous detachment (PVD) right eye  (primary encounter diagnosis)  No tears on scleral depression   Retina detachment precautions were discussed with the patient (presence or increased in flashes, floaters or a curtain in the visual field) and was asked to return if any of the those occur    #optic nerve heme right eye   Probably related to Posterior vitreous detachment (PVD)  Will observe for now   Normal ONFL     # Cataracts, cortical and nuclear bilateral  Patient was on flomax  becoming visual significance with cortical in visual axis given glare symptoms,  Difficulty driving in the evening   Will repeat BAT testing next follow up   Plan: cataract evaluation next follow up     # history of Carly syndrome in 2014  He has anisocoria greater in dark with 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    secondary to his left internal carotid dissection in November of 2014 after an MRI was ordered in ER.  Patient with history  of neck massage. He was diagnosed with mini-strokes related to the dissection, but has no sequelae from these. He is off on anticoagulation, and per the patient, the size of his artery has returned to normal.  Discussed that these findings will likely persist over time. If the ptosis becomes bothersome, can consider ptosis repair surgery. He is currently not bothered, so will observe.   Recommend to discussed with primary care physician/ cardiologist regarding future neck massage.    # Myopic astigmatism and Presbyopia- Good vision with current glasses, Takes glasses off to read    PLAN   Follow up in 4-8 weeks with cataract evaluation. BAT and intraocular lens calcs   Retina detachment precautions were discussed with the patient (presence or increased in flashes, floaters or a curtain in the visual field) and was asked to return if any of the those occur        ~~~~~~~~~~~~~~~~~~~~~~~~~~~~~~~~~~   Complete documentation of historical and exam elements from today's encounter can be found in the full encounter summary report (not reduplicated in this progress note).  I personally obtained the chief complaint(s) and history of present illness.  I confirmed and edited as necessary the review of systems, past medical/surgical history, family history, social history, and examination findings as documented by others; and I examined the patient myself.  I personally reviewed the relevant tests, images, and reports as documented above.  I formulated and edited as necessary the assessment and plan and discussed the findings and management plan with the patient and family    Yarely Lamar MD   of Ophthalmology.  Retina Service   Department of Ophthalmology and Visual Neurosciences   [facility]  Phone: [phone]   Fax: [phone]

## 2022-03-03 NOTE — NURSING NOTE
"Chief Complaints and History of Present Illnesses   Patient presents with     Follow Up     Chief Complaint(s) and History of Present Illness(es)     Follow Up     Laterality: right eye    Onset: weeks ago    Associated symptoms: floaters (OD).  Negative for flashes and eye pain    Pain scale: 0/10              Comments     3 week follow up for PVD right eye and Optic nerve heme right eye with testing today.   \"I got flashes the day after I was here, but then it went away.\" Patient notes vision is stable each eye in the last few weeks. Patient notes right eye feels \"off\", but \"nothing I can really point to\".     Lenore Miles, COT 12:44 PM 03/03/2022                  "

## 2022-03-09 ENCOUNTER — VIRTUAL VISIT (OUTPATIENT)
Dept: PSYCHOLOGY | Facility: CLINIC | Age: 72
End: 2022-03-09
Payer: COMMERCIAL

## 2022-03-09 DIAGNOSIS — F41.1 GAD (GENERALIZED ANXIETY DISORDER): Primary | ICD-10-CM

## 2022-03-09 PROCEDURE — 90834 PSYTX W PT 45 MINUTES: CPT | Mod: 95
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
## 2022-03-14 ENCOUNTER — MYC MEDICAL ADVICE (OUTPATIENT)
Dept: UROLOGY | Facility: CLINIC | Age: 72
End: 2022-03-14
Payer: COMMERCIAL

## 2022-03-14 DIAGNOSIS — N40.1 BENIGN PROSTATIC HYPERPLASIA WITH WEAK URINARY STREAM: ICD-10-CM

## 2022-03-14 DIAGNOSIS — R39.12 BENIGN PROSTATIC HYPERPLASIA WITH WEAK URINARY STREAM: ICD-10-CM

## 2022-03-15 RX ORDER — TAMSULOSIN HYDROCHLORIDE 0.4 MG/1
0.4 CAPSULE ORAL DAILY
Qty: 90 CAPSULE | Refills: 1 | Status: SHIPPED | OUTPATIENT
Start: 2022-03-15 | End: 2022-06-07

## 2022-03-15 NOTE — TELEPHONE ENCOUNTER
" tamsulosin (FLOMAX) 0.4 MG capsule  Last Written Prescription Date:  6/8/2021  Last Fill Quantity: 90,   # refills: 3  Last Office Visit : 12/28/21  Future Office visit: Recommended: \" Will plan for 6 month follow up in-person to update MICHELET, with a PSA drawn beforehand \"       Resent remaining per fulfillment. Reminder to make appt for Juliet    [id number] [place], [place] - [address] [place] [address]    "

## 2022-03-24 ENCOUNTER — VIRTUAL VISIT (OUTPATIENT)
Dept: PSYCHOLOGY | Facility: CLINIC | Age: 72
End: 2022-03-24
Payer: COMMERCIAL

## 2022-03-24 DIAGNOSIS — F41.1 GAD (GENERALIZED ANXIETY DISORDER): Primary | ICD-10-CM

## 2022-03-24 PROCEDURE — 90834 PSYTX W PT 45 MINUTES: CPT | Mod: 95
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
## 2022-03-31 ENCOUNTER — VIRTUAL VISIT (OUTPATIENT)
Dept: PSYCHOLOGY | Facility: CLINIC | Age: 72
End: 2022-03-31
Payer: COMMERCIAL

## 2022-03-31 DIAGNOSIS — F41.1 GAD (GENERALIZED ANXIETY DISORDER): Primary | ICD-10-CM

## 2022-03-31 PROCEDURE — 90834 PSYTX W PT 45 MINUTES: CPT | Mod: 95
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
## 2022-04-13 ENCOUNTER — VIRTUAL VISIT (OUTPATIENT)
Dept: PSYCHOLOGY | Facility: CLINIC | Age: 72
End: 2022-04-13
Payer: COMMERCIAL

## 2022-04-13 DIAGNOSIS — F41.1 GAD (GENERALIZED ANXIETY DISORDER): Primary | ICD-10-CM

## 2022-04-13 PROCEDURE — 90834 PSYTX W PT 45 MINUTES: CPT | Mod: 95
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
## 2022-04-20 ENCOUNTER — OFFICE VISIT (OUTPATIENT)
Dept: FAMILY MEDICINE | Facility: CLINIC | Age: 72
End: 2022-04-20
Payer: COMMERCIAL

## 2022-04-20 VITALS
BODY MASS INDEX: 26.78 KG/M2 | SYSTOLIC BLOOD PRESSURE: 137 MMHG | TEMPERATURE: 98.1 F | OXYGEN SATURATION: 98 % | DIASTOLIC BLOOD PRESSURE: 87 MMHG | WEIGHT: 176.7 LBS | HEIGHT: 68 IN | HEART RATE: 107 BPM

## 2022-04-20 DIAGNOSIS — R63.1 POLYDIPSIA: Primary | ICD-10-CM

## 2022-04-20 DIAGNOSIS — M25.552 HIP PAIN, LEFT: ICD-10-CM

## 2022-04-20 DIAGNOSIS — R68.2 DRY MOUTH: ICD-10-CM

## 2022-04-20 LAB
ALBUMIN SERPL-MCNC: 3.5 G/DL (ref 3.4–5)
ALBUMIN UR-MCNC: NEGATIVE MG/DL
ALP SERPL-CCNC: 66 U/L (ref 40–150)
ALT SERPL W P-5'-P-CCNC: 26 U/L (ref 0–70)
ANION GAP SERPL CALCULATED.3IONS-SCNC: 5 MMOL/L (ref 3–14)
APPEARANCE UR: CLEAR
AST SERPL W P-5'-P-CCNC: 23 U/L (ref 0–45)
BILIRUB SERPL-MCNC: 0.6 MG/DL (ref 0.2–1.3)
BILIRUB UR QL STRIP: NEGATIVE
BUN SERPL-MCNC: 12 MG/DL (ref 7–30)
CALCIUM SERPL-MCNC: 9.1 MG/DL (ref 8.5–10.1)
CHLORIDE BLD-SCNC: 107 MMOL/L (ref 94–109)
CO2 SERPL-SCNC: 28 MMOL/L (ref 20–32)
COLOR UR AUTO: YELLOW
CREAT SERPL-MCNC: 1.08 MG/DL (ref 0.66–1.25)
GFR SERPL CREATININE-BSD FRML MDRD: 73 ML/MIN/1.73M2
GLUCOSE BLD-MCNC: 104 MG/DL (ref 70–99)
GLUCOSE UR STRIP-MCNC: NEGATIVE MG/DL
HBA1C MFR BLD: 5.5 % (ref 0–5.6)
HGB UR QL STRIP: ABNORMAL
KETONES UR STRIP-MCNC: NEGATIVE MG/DL
LEUKOCYTE ESTERASE UR QL STRIP: NEGATIVE
NITRATE UR QL: NEGATIVE
PH UR STRIP: 6 [PH] (ref 5–7)
POTASSIUM BLD-SCNC: 5.2 MMOL/L (ref 3.4–5.3)
PROT SERPL-MCNC: 7.2 G/DL (ref 6.8–8.8)
SODIUM SERPL-SCNC: 140 MMOL/L (ref 133–144)
SP GR UR STRIP: 1.01 (ref 1–1.03)
UROBILINOGEN UR STRIP-ACNC: 0.2 E.U./DL

## 2022-04-20 PROCEDURE — 83036 HEMOGLOBIN GLYCOSYLATED A1C: CPT | Performed by: NURSE PRACTITIONER

## 2022-04-20 PROCEDURE — 87086 URINE CULTURE/COLONY COUNT: CPT | Performed by: NURSE PRACTITIONER

## 2022-04-20 PROCEDURE — 80053 COMPREHEN METABOLIC PANEL: CPT | Performed by: NURSE PRACTITIONER
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
## 2022-04-20 NOTE — NURSING NOTE
"ROOM:1  [facility]    Preferred Name: Harlan     71 year old  Chief Complaint   Patient presents with     Musculoskeletal Problem     Left bone pain, notice when standing from sitting, able to sleep but when getting up in the morning painful when straightening out     Excessive Thirst       Blood pressure 137/87, pulse 107, temperature 98.1  F (36.7  C), temperature source Oral, height 1.735 m (5' 8.3\"), weight 80.2 kg (176 lb 11.2 oz), SpO2 98 %. Body mass index is 26.63 kg/m .  BP completed using cuff size:    Patient Active Problem List   Diagnosis     Carotid artery dissection (H)     History of TIA (transient ischemic attack) and stroke     Carly syndrome     Stroke (H)     Elevated cholesterol     GERD with esophagitis     Male erectile dysfunction     Occlusion and stenosis of carotid artery     Pain in shoulder     Carly's syndrome pupil       Wt Readings from Last 2 Encounters:   22 80.2 kg (176 lb 11.2 oz)   21 78.1 kg (172 lb 3.2 oz)     BP Readings from Last 3 Encounters:   22 137/87   21 (!) 146/87   21 (!) 159/92       Allergies   Allergen Reactions     Metoclopramide Other (See Comments)     [\"Given for diagnosis of migraine, later disproved\"]   Dystonic reaction. Resolved with benadryl   seizure like reaction. LegacyRecord#[id number]       Current Outpatient Medications   Medication     sildenafil (VIAGRA) 50 MG tablet     tamsulosin (FLOMAX) 0.4 MG capsule     terbinafine (LAMISIL) 1 % cream     No current facility-administered medications for this visit.       Social History     Tobacco Use     Smoking status: Former Smoker     Quit date: 1983     Years since quittin.2     Smokeless tobacco: Never Used     Tobacco comment: does not vape    Substance Use Topics     Alcohol use: Yes     Comment: beer 2 daily     Drug use: No       Honoring Choices - Health Care Directive Guide offered to patient at time of visit.    Health Maintenance Due   Topic Date Due     " HEPATITIS C SCREENING  Never done     ZOSTER IMMUNIZATION (1 of 2) Never done     Pneumococcal Vaccine: 65+ Years (1 of 1 - PPSV23) Never done     INFLUENZA VACCINE (1) 09/01/2021       Immunization History   Administered Date(s) Administered     COVID-19,PF,Moderna 02/12/2021, 03/12/2021     COVID-19,PF,Moderna Booster 11/09/2021     FLU 6-35 months 11/23/2010     Td (Adult), Adsorbed 03/05/2003     Tdap (Adacel,Boostrix) 04/05/2006, 04/15/2014       No results found for: PAP    Recent Labs   Lab Test 07/08/21  1340 01/28/15  0710 11/19/14  0430 11/17/14 2041   A1C  --   --  5.8  --    *  --  104  --    HDL 50  --  47  --    TRIG 104  --  100  --    ALT 23  --   --  34   CR 0.87 0.88 0.93 0.95   GFRESTIMATED 87 87 82 79   GFRESTBLACK >90 >90   GFR Calc   >90   GFR Calc   >90   GFR Calc     ALBUMIN 3.6  --   --  3.6   POTASSIUM 4.0  --  4.3 3.7   TSH 1.97  --   --   --        PHQ-2 ( 1999 Pfizer) 2/10/2022 12/29/2021   Q1: Little interest or pleasure in doing things 0 0   Q2: Feeling down, depressed or hopeless 0 0   PHQ-2 Score 0 0   PHQ-2 Total Score (12-17 Years)- Positive if 3 or more points; Administer PHQ-A if positive - -   Q1: Little interest or pleasure in doing things - Not at all   Q2: Feeling down, depressed or hopeless - Not at all   PHQ-2 Score - 0       PHQ-9 SCORE 7/8/2021 8/20/2021 11/10/2021 11/29/2021   PHQ-9 Total Score MyChart - 2 (Minimal depression) 0 -   PHQ-9 Total Score 4 2 0 2       SAKSHI-7 SCORE 8/20/2021 11/10/2021 11/29/2021   Total Score 4 (minimal anxiety) 0 (minimal anxiety) -   Total Score 4 0 2       No flowsheet data found.    Arin Lee    April 20, 2022 12:51 PM

## 2022-04-21 ENCOUNTER — ANCILLARY PROCEDURE (OUTPATIENT)
Dept: GENERAL RADIOLOGY | Facility: CLINIC | Age: 72
End: 2022-04-21
Attending: NURSE PRACTITIONER
Payer: COMMERCIAL

## 2022-04-21 DIAGNOSIS — M25.552 HIP PAIN, LEFT: Primary | ICD-10-CM

## 2022-04-21 DIAGNOSIS — M25.552 HIP PAIN, LEFT: ICD-10-CM

## 2022-04-21 PROCEDURE — 73502 X-RAY EXAM HIP UNI 2-3 VIEWS: CPT | Mod: LT | Performed by: RADIOLOGY

## 2022-04-22 LAB — BACTERIA UR CULT: NO GROWTH

## 2022-05-23 ENCOUNTER — PRE VISIT (OUTPATIENT)
Dept: UROLOGY | Facility: CLINIC | Age: 72
End: 2022-05-23
Payer: COMMERCIAL

## 2022-05-23 NOTE — TELEPHONE ENCOUNTER
Reason for visit: 6 month follow up      Dx/Hx/Sx: BPH w/LUTS, ED    Records/imaging/labs/orders: PSA in epic    At Rooming: standard

## 2022-05-26 ENCOUNTER — IMMUNIZATION (OUTPATIENT)
Dept: NURSING | Facility: CLINIC | Age: 72
End: 2022-05-26
Payer: COMMERCIAL

## 2022-05-26 PROCEDURE — 91306 COVID-19,PF,MODERNA (18+ YRS BOOSTER .25ML): CPT

## 2022-05-26 PROCEDURE — 0064A COVID-19,PF,MODERNA (18+ YRS BOOSTER .25ML): CPT

## 2022-05-31 DIAGNOSIS — Z00.00 HEALTHCARE MAINTENANCE: Primary | ICD-10-CM

## 2022-06-01 ENCOUNTER — LAB (OUTPATIENT)
Dept: LAB | Facility: CLINIC | Age: 72
End: 2022-06-01
Payer: COMMERCIAL

## 2022-06-01 DIAGNOSIS — R39.12 BENIGN PROSTATIC HYPERPLASIA WITH WEAK URINARY STREAM: ICD-10-CM

## 2022-06-01 DIAGNOSIS — Z00.00 HEALTHCARE MAINTENANCE: ICD-10-CM

## 2022-06-01 DIAGNOSIS — N40.1 BENIGN PROSTATIC HYPERPLASIA WITH WEAK URINARY STREAM: ICD-10-CM

## 2022-06-01 LAB
PSA SERPL-MCNC: 3 UG/L (ref 0–4)
PSA SERPL-MCNC: 3 UG/L (ref 0–4)

## 2022-06-01 PROCEDURE — 84153 ASSAY OF PSA TOTAL: CPT | Performed by: NURSE PRACTITIONER

## 2022-06-01 PROCEDURE — G0103 PSA SCREENING: HCPCS | Performed by: NURSE PRACTITIONER
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
## 2022-06-07 ENCOUNTER — OFFICE VISIT (OUTPATIENT)
Dept: UROLOGY | Facility: CLINIC | Age: 72
End: 2022-06-07
Payer: COMMERCIAL

## 2022-06-07 VITALS
SYSTOLIC BLOOD PRESSURE: 149 MMHG | HEIGHT: 69 IN | BODY MASS INDEX: 25.77 KG/M2 | DIASTOLIC BLOOD PRESSURE: 85 MMHG | WEIGHT: 174 LBS | HEART RATE: 78 BPM

## 2022-06-07 DIAGNOSIS — R39.12 BENIGN PROSTATIC HYPERPLASIA WITH WEAK URINARY STREAM: ICD-10-CM

## 2022-06-07 DIAGNOSIS — N40.1 BENIGN PROSTATIC HYPERPLASIA WITH WEAK URINARY STREAM: ICD-10-CM

## 2022-06-07 LAB
ALBUMIN UR-MCNC: NEGATIVE MG/DL
APPEARANCE UR: CLEAR
BILIRUB UR QL STRIP: NEGATIVE
COLOR UR AUTO: ABNORMAL
GLUCOSE UR STRIP-MCNC: NEGATIVE MG/DL
HGB UR QL STRIP: ABNORMAL
KETONES UR STRIP-MCNC: NEGATIVE MG/DL
LEUKOCYTE ESTERASE UR QL STRIP: NEGATIVE
NITRATE UR QL: NEGATIVE
PH UR STRIP: 6 [PH] (ref 5–7)
RBC URINE: <1 /HPF
SP GR UR STRIP: 1.01 (ref 1–1.03)
UROBILINOGEN UR STRIP-MCNC: NORMAL MG/DL
WBC URINE: 0 /HPF

## 2022-06-07 PROCEDURE — 81001 URINALYSIS AUTO W/SCOPE: CPT | Performed by: PATHOLOGY

## 2022-06-07 PROCEDURE — 99213 OFFICE O/P EST LOW 20 MIN: CPT | Mod: 25 | Performed by: NURSE PRACTITIONER

## 2022-06-07 PROCEDURE — 87086 URINE CULTURE/COLONY COUNT: CPT | Mod: 90 | Performed by: PATHOLOGY

## 2022-06-07 PROCEDURE — 99000 SPECIMEN HANDLING OFFICE-LAB: CPT | Performed by: PATHOLOGY

## 2022-06-07 PROCEDURE — 51798 US URINE CAPACITY MEASURE: CPT | Performed by: NURSE PRACTITIONER

## 2022-06-07 RX ORDER — TAMSULOSIN HYDROCHLORIDE 0.4 MG/1
0.4 CAPSULE ORAL DAILY
Qty: 90 CAPSULE | Refills: 3 | Status: SHIPPED | OUTPATIENT
Start: 2022-06-07 | End: 2023-10-10

## 2022-06-07 ASSESSMENT — PAIN SCALES - GENERAL: PAINLEVEL: NO PAIN (0)
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
## 2022-06-07 NOTE — NURSING NOTE
"Chief Complaint   Patient presents with     RECHECK     6 month PSA check       Blood pressure (!) 149/85, pulse 78, height 1.74 m (5' 8.5\"), weight 78.9 kg (174 lb). Body mass index is 26.07 kg/m .    Patient Active Problem List   Diagnosis     Carotid artery dissection (H)     History of TIA (transient ischemic attack) and stroke     Carly syndrome     Stroke (H)     Elevated cholesterol     GERD with esophagitis     Male erectile dysfunction     Occlusion and stenosis of carotid artery     Pain in shoulder     Carly's syndrome pupil       Allergies   Allergen Reactions     Metoclopramide Other (See Comments)     [\"Given for diagnosis of migraine, later disproved\"]   Dystonic reaction. Resolved with benadryl   seizure like reaction. LegacyRecord#[id number]       Current Outpatient Medications   Medication Sig Dispense Refill     sildenafil (VIAGRA) 50 MG tablet Take 1 tablet (50 mg) by mouth daily as needed (Erectile dysfunction) 30 tablet 3     tamsulosin (FLOMAX) 0.4 MG capsule Take 1 capsule (0.4 mg) by mouth daily 90 capsule 1     terbinafine (LAMISIL) 1 % cream Apply topically 2 times daily         Social History     Tobacco Use     Smoking status: Former Smoker     Quit date: 1983     Years since quittin.3     Smokeless tobacco: Never Used     Tobacco comment: does not vape    Substance Use Topics     Alcohol use: Yes     Comment: beer 2 daily     Drug use: No       Tawanna Quezada CMA  2022  3:30 PM     "

## 2022-06-07 NOTE — PATIENT INSTRUCTIONS
UROLOGY CLINIC VISIT PATIENT INSTRUCTIONS    -Continue the Flomax daily. I have sent refills to your pharmacy for the rest of the year.   -Follow up in one year.     If you have any issues, questions or concerns in the meantime, do not hesitate to contact us at [phone] or via [employer].     Darlene Sung, CNP  Department of Urology

## 2022-06-08 ENCOUNTER — MYC MEDICAL ADVICE (OUTPATIENT)
Dept: UROLOGY | Facility: CLINIC | Age: 72
End: 2022-06-08
Payer: COMMERCIAL

## 2022-06-09 LAB — BACTERIA UR CULT: NO GROWTH

## 2022-07-22 ENCOUNTER — MYC MEDICAL ADVICE (OUTPATIENT)
Dept: FAMILY MEDICINE | Facility: CLINIC | Age: 72
End: 2022-07-22

## 2022-07-22 DIAGNOSIS — Z11.52 ENCOUNTER FOR SCREENING FOR COVID-19: Primary | ICD-10-CM

## 2022-07-22 RX ORDER — COVID-19 ANTIGEN TEST
1 KIT MISCELLANEOUS PRN
Qty: 4 KIT | Refills: 0 | Status: SHIPPED | OUTPATIENT
Start: 2022-07-22 | End: 2024-02-01

## 2022-07-25 ENCOUNTER — VIRTUAL VISIT (OUTPATIENT)
Dept: PSYCHOLOGY | Facility: CLINIC | Age: 72
End: 2022-07-25
Payer: COMMERCIAL

## 2022-07-25 DIAGNOSIS — F41.1 GAD (GENERALIZED ANXIETY DISORDER): Primary | ICD-10-CM

## 2022-07-25 PROCEDURE — 90834 PSYTX W PT 45 MINUTES: CPT | Mod: 95
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
## 2022-08-02 ENCOUNTER — VIRTUAL VISIT (OUTPATIENT)
Dept: PSYCHOLOGY | Facility: CLINIC | Age: 72
End: 2022-08-02
Payer: COMMERCIAL

## 2022-08-02 DIAGNOSIS — F41.1 GAD (GENERALIZED ANXIETY DISORDER): Primary | ICD-10-CM

## 2022-08-02 PROCEDURE — 90834 PSYTX W PT 45 MINUTES: CPT | Mod: 95
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
## 2022-08-08 ENCOUNTER — VIRTUAL VISIT (OUTPATIENT)
Dept: PSYCHOLOGY | Facility: CLINIC | Age: 72
End: 2022-08-08
Payer: COMMERCIAL

## 2022-08-08 DIAGNOSIS — F41.1 GAD (GENERALIZED ANXIETY DISORDER): Primary | ICD-10-CM

## 2022-08-08 PROCEDURE — 90834 PSYTX W PT 45 MINUTES: CPT | Mod: 95
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
## 2022-08-13 ENCOUNTER — HEALTH MAINTENANCE LETTER (OUTPATIENT)
Age: 72
End: 2022-08-13

## 2022-08-15 ENCOUNTER — VIRTUAL VISIT (OUTPATIENT)
Dept: PSYCHOLOGY | Facility: CLINIC | Age: 72
End: 2022-08-15
Payer: COMMERCIAL

## 2022-08-15 DIAGNOSIS — F41.1 GAD (GENERALIZED ANXIETY DISORDER): Primary | ICD-10-CM

## 2022-08-15 PROCEDURE — 90834 PSYTX W PT 45 MINUTES: CPT | Mod: 95
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
## 2022-08-22 ENCOUNTER — VIRTUAL VISIT (OUTPATIENT)
Dept: PSYCHOLOGY | Facility: CLINIC | Age: 72
End: 2022-08-22
Payer: COMMERCIAL

## 2022-08-22 DIAGNOSIS — F41.1 GAD (GENERALIZED ANXIETY DISORDER): Primary | ICD-10-CM

## 2022-08-22 PROCEDURE — 90834 PSYTX W PT 45 MINUTES: CPT | Mod: 95
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
## 2022-08-29 ENCOUNTER — VIRTUAL VISIT (OUTPATIENT)
Dept: PSYCHOLOGY | Facility: CLINIC | Age: 72
End: 2022-08-29
Payer: COMMERCIAL

## 2022-08-29 DIAGNOSIS — F41.1 GAD (GENERALIZED ANXIETY DISORDER): Primary | ICD-10-CM

## 2022-08-29 PROCEDURE — 90834 PSYTX W PT 45 MINUTES: CPT | Mod: 95
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
## 2022-09-12 ENCOUNTER — VIRTUAL VISIT (OUTPATIENT)
Dept: PSYCHOLOGY | Facility: CLINIC | Age: 72
End: 2022-09-12
Payer: COMMERCIAL

## 2022-09-12 DIAGNOSIS — F41.1 GAD (GENERALIZED ANXIETY DISORDER): Primary | ICD-10-CM

## 2022-09-12 PROCEDURE — 90834 PSYTX W PT 45 MINUTES: CPT | Mod: 95
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
## 2022-09-26 ENCOUNTER — VIRTUAL VISIT (OUTPATIENT)
Dept: PSYCHOLOGY | Facility: CLINIC | Age: 72
End: 2022-09-26
Payer: COMMERCIAL

## 2022-09-26 DIAGNOSIS — F41.1 GAD (GENERALIZED ANXIETY DISORDER): Primary | ICD-10-CM

## 2022-09-26 PROCEDURE — 90834 PSYTX W PT 45 MINUTES: CPT | Mod: 95
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
## 2022-10-03 ENCOUNTER — VIRTUAL VISIT (OUTPATIENT)
Dept: PSYCHOLOGY | Facility: CLINIC | Age: 72
End: 2022-10-03
Payer: COMMERCIAL

## 2022-10-03 DIAGNOSIS — F41.1 GAD (GENERALIZED ANXIETY DISORDER): Primary | ICD-10-CM

## 2022-10-03 PROCEDURE — 90834 PSYTX W PT 45 MINUTES: CPT | Mod: 95

## 2022-10-04 PROBLEM — N52.9 ERECTILE DYSFUNCTION: Status: ACTIVE | Noted: 2018-11-01
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
## 2022-10-24 ENCOUNTER — VIRTUAL VISIT (OUTPATIENT)
Dept: PSYCHOLOGY | Facility: CLINIC | Age: 72
End: 2022-10-24
Payer: COMMERCIAL

## 2022-10-24 DIAGNOSIS — F41.1 GAD (GENERALIZED ANXIETY DISORDER): Primary | ICD-10-CM

## 2022-10-24 PROCEDURE — 90834 PSYTX W PT 45 MINUTES: CPT | Mod: 95
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
## 2022-10-26 ENCOUNTER — OFFICE VISIT (OUTPATIENT)
Dept: FAMILY MEDICINE | Facility: CLINIC | Age: 72
End: 2022-10-26
Payer: COMMERCIAL

## 2022-10-26 VITALS
HEART RATE: 94 BPM | OXYGEN SATURATION: 96 % | WEIGHT: 179.8 LBS | DIASTOLIC BLOOD PRESSURE: 91 MMHG | SYSTOLIC BLOOD PRESSURE: 153 MMHG | TEMPERATURE: 98.9 F | HEIGHT: 69 IN | BODY MASS INDEX: 26.63 KG/M2

## 2022-10-26 DIAGNOSIS — S29.9XXA TRAUMATIC INJURY OF RIB: Primary | ICD-10-CM
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
## 2022-10-26 NOTE — NURSING NOTE
"ROOM:1  [facility]    Preferred Name: Harlan VALDEZ AGREED:               DECLINED:            72 year old  Chief Complaint   Patient presents with     , fell on uneven sidewalk, fell onto instrument case hitting left side of ribcage, right knee and left palm     Gas       Blood pressure (!) 153/91, pulse 94, temperature 98.9  F (37.2  C), temperature source Oral, height 1.755 m (5' 9.1\"), weight 81.6 kg (179 lb 12.8 oz), SpO2 96 %. Body mass index is 26.48 kg/m .  BP completed using cuff size:        Patient Active Problem List   Diagnosis     Carotid artery dissection (H)     History of TIA (transient ischemic attack) and stroke     Carly syndrome     Stroke (H)     Elevated cholesterol     GERD with esophagitis     Male erectile dysfunction     Occlusion and stenosis of carotid artery     Pain in shoulder     Carly's syndrome pupil       Wt Readings from Last 2 Encounters:   10/26/22 81.6 kg (179 lb 12.8 oz)   22 78.9 kg (174 lb)     BP Readings from Last 3 Encounters:   10/26/22 (!) 153/91   22 (!) 149/85   22 137/87       Allergies   Allergen Reactions     Metoclopramide Other (See Comments)     [\"Given for diagnosis of migraine, later disproved\"]   Dystonic reaction. Resolved with benadryl   seizure like reaction. LegacyRecord#[id number]       Current Outpatient Medications   Medication     COVID-19 At-Home Test KIT     sildenafil (VIAGRA) 50 MG tablet     tamsulosin (FLOMAX) 0.4 MG capsule     terbinafine (LAMISIL) 1 % cream     No current facility-administered medications for this visit.       Social History     Tobacco Use     Smoking status: Former     Types: Cigarettes     Quit date: 1983     Years since quittin.7     Smokeless tobacco: Never     Tobacco comments:     does not vape    Substance Use Topics     Alcohol use: Yes     Comment: beer 2 daily     Drug use: No       Honoring Choices - Health Care Directive Guide offered to patient at time of " visit.    Health Maintenance Due   Topic Date Due     HEPATITIS B IMMUNIZATION (1 of 3 - 3-dose series) Never done     HEPATITIS C SCREENING  Never done     ZOSTER IMMUNIZATION (1 of 2) Never done     Pneumococcal Vaccine: 65+ Years (1 - PCV) Never done     MEDICARE ANNUAL WELLNESS VISIT  07/08/2022     COVID-19 Vaccine (5 - Booster for Moderna series) 07/21/2022     INFLUENZA VACCINE (1) 09/01/2022       Immunization History   Administered Date(s) Administered     COVID-19,PF,Moderna 02/12/2021, 03/12/2021     COVID-19,PF,Moderna Booster 11/09/2021, 05/26/2022     FLU 6-35 months 11/23/2010     Td (Adult), Adsorbed 03/05/2003     Tdap (Adacel,Boostrix) 04/05/2006, 04/15/2014       No results found for: PAP    Recent Labs   Lab Test 04/20/22  1340 07/08/21  1340 01/28/15  0710 11/19/14  0430 11/17/14 2041   A1C 5.5  --   --  5.8  --    LDL  --  120*  --  104  --    HDL  --  50  --  47  --    TRIG  --  104  --  100  --    ALT 26 23  --   --  34   CR 1.08 0.87 0.88 0.93 0.95   GFRESTIMATED 73 87 87 82 79   GFRESTBLACK  --  >90 >90   GFR Calc   >90   GFR Calc   >90   GFR Calc     ALBUMIN 3.5 3.6  --   --  3.6   POTASSIUM 5.2 4.0  --  4.3 3.7   TSH  --  1.97  --   --   --        PHQ-2 ( 1999 Pfizer) 10/23/2022 7/25/2022   Q1: Little interest or pleasure in doing things 0 0   Q2: Feeling down, depressed or hopeless 0 0   PHQ-2 Score 0 0   PHQ-2 Total Score (12-17 Years)- Positive if 3 or more points; Administer PHQ-A if positive - -   Q1: Little interest or pleasure in doing things Not at all Not at all   Q2: Feeling down, depressed or hopeless Not at all Not at all   PHQ-2 Score 0 0       PHQ-9 SCORE 7/8/2021 8/20/2021 11/10/2021 11/29/2021   PHQ-9 Total Score MyChart - 2 (Minimal depression) 0 -   PHQ-9 Total Score 4 2 0 2       SAKSHI-7 SCORE 8/20/2021 11/10/2021 11/29/2021   Total Score 4 (minimal anxiety) 0 (minimal anxiety) -   Total Score 4 0 2       No flowsheet data  found.    Arin Jesus    October 26, 2022 2:01 PM

## 2022-10-27 ENCOUNTER — ANCILLARY PROCEDURE (OUTPATIENT)
Dept: GENERAL RADIOLOGY | Facility: CLINIC | Age: 72
End: 2022-10-27
Attending: NURSE PRACTITIONER
Payer: COMMERCIAL

## 2022-10-27 DIAGNOSIS — S29.9XXA TRAUMATIC INJURY OF RIB: ICD-10-CM

## 2022-10-27 PROCEDURE — 71101 X-RAY EXAM UNILAT RIBS/CHEST: CPT | Mod: LT | Performed by: RADIOLOGY

## 2022-10-29 ENCOUNTER — HEALTH MAINTENANCE LETTER (OUTPATIENT)
Age: 72
End: 2022-10-29

## 2022-11-08 ENCOUNTER — VIRTUAL VISIT (OUTPATIENT)
Dept: PSYCHOLOGY | Facility: CLINIC | Age: 72
End: 2022-11-08
Payer: COMMERCIAL

## 2022-11-08 DIAGNOSIS — F41.1 GAD (GENERALIZED ANXIETY DISORDER): Primary | ICD-10-CM

## 2022-11-08 PROCEDURE — 90834 PSYTX W PT 45 MINUTES: CPT | Mod: 95
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
## 2022-11-21 ENCOUNTER — VIRTUAL VISIT (OUTPATIENT)
Dept: PSYCHOLOGY | Facility: CLINIC | Age: 72
End: 2022-11-21
Payer: COMMERCIAL

## 2022-11-21 DIAGNOSIS — F41.1 GAD (GENERALIZED ANXIETY DISORDER): Primary | ICD-10-CM

## 2022-11-21 PROCEDURE — 90834 PSYTX W PT 45 MINUTES: CPT | Mod: 95
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
## 2022-12-05 ENCOUNTER — VIRTUAL VISIT (OUTPATIENT)
Dept: PSYCHOLOGY | Facility: CLINIC | Age: 72
End: 2022-12-05
Payer: COMMERCIAL

## 2022-12-05 DIAGNOSIS — F41.1 GAD (GENERALIZED ANXIETY DISORDER): Primary | ICD-10-CM

## 2022-12-05 PROCEDURE — 90834 PSYTX W PT 45 MINUTES: CPT | Mod: 95
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
## 2022-12-19 ENCOUNTER — VIRTUAL VISIT (OUTPATIENT)
Dept: PSYCHOLOGY | Facility: CLINIC | Age: 72
End: 2022-12-19
Payer: COMMERCIAL

## 2022-12-19 DIAGNOSIS — F41.1 GAD (GENERALIZED ANXIETY DISORDER): Primary | ICD-10-CM

## 2022-12-19 PROCEDURE — 90834 PSYTX W PT 45 MINUTES: CPT | Mod: 95
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
## 2023-01-01 DIAGNOSIS — N52.9 ERECTILE DYSFUNCTION, UNSPECIFIED ERECTILE DYSFUNCTION TYPE: ICD-10-CM

## 2023-01-02 ENCOUNTER — VIRTUAL VISIT (OUTPATIENT)
Dept: PSYCHOLOGY | Facility: CLINIC | Age: 73
End: 2023-01-02
Payer: COMMERCIAL

## 2023-01-02 DIAGNOSIS — F41.1 GAD (GENERALIZED ANXIETY DISORDER): Primary | ICD-10-CM

## 2023-01-02 PROCEDURE — 90834 PSYTX W PT 45 MINUTES: CPT | Mod: 95
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
## 2023-01-04 NOTE — TELEPHONE ENCOUNTER
sildenafil (VIAGRA) 50 MG tablet      Last Written Prescription Date:  12/28/2021  Last Fill Quantity: 30,   # refills: 3  Last Office Visit : 6/7/2022  [facility]  Future Office visit:  6/6/2023    Routing refill request for Sildenafil to provider for review/approval because:  Failed medication protocol: alpha blocker on med list  - tamsulosin (FLOMAX) 0.4 MG capsule

## 2023-01-06 RX ORDER — SILDENAFIL 50 MG/1
50 TABLET, FILM COATED ORAL DAILY PRN
Qty: 30 TABLET | Refills: 5 | Status: SHIPPED | OUTPATIENT
Start: 2023-01-06 | End: 2024-09-10

## 2023-01-16 ENCOUNTER — VIRTUAL VISIT (OUTPATIENT)
Dept: PSYCHOLOGY | Facility: CLINIC | Age: 73
End: 2023-01-16
Payer: COMMERCIAL

## 2023-01-16 DIAGNOSIS — F41.1 GAD (GENERALIZED ANXIETY DISORDER): Primary | ICD-10-CM

## 2023-01-16 PROCEDURE — 90834 PSYTX W PT 45 MINUTES: CPT | Mod: 95
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
## 2023-01-19 ENCOUNTER — IMMUNIZATION (OUTPATIENT)
Dept: NURSING | Facility: CLINIC | Age: 73
End: 2023-01-19
Payer: COMMERCIAL

## 2023-01-19 PROCEDURE — 0134A COVID-19 VACCINE BIVALENT BOOSTER 18+ (MODERNA): CPT

## 2023-01-19 PROCEDURE — 91313 COVID-19 VACCINE BIVALENT BOOSTER 18+ (MODERNA): CPT

## 2023-02-20 ENCOUNTER — VIRTUAL VISIT (OUTPATIENT)
Dept: PSYCHOLOGY | Facility: CLINIC | Age: 73
End: 2023-02-20
Payer: COMMERCIAL

## 2023-02-20 DIAGNOSIS — F41.1 GAD (GENERALIZED ANXIETY DISORDER): Primary | ICD-10-CM

## 2023-02-20 PROCEDURE — 90834 PSYTX W PT 45 MINUTES: CPT | Mod: VID
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
## 2023-03-06 ENCOUNTER — VIRTUAL VISIT (OUTPATIENT)
Dept: PSYCHOLOGY | Facility: CLINIC | Age: 73
End: 2023-03-06
Payer: COMMERCIAL

## 2023-03-06 DIAGNOSIS — F41.1 GAD (GENERALIZED ANXIETY DISORDER): Primary | ICD-10-CM

## 2023-03-06 PROCEDURE — 90834 PSYTX W PT 45 MINUTES: CPT | Mod: VID
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
## 2023-03-23 ENCOUNTER — VIRTUAL VISIT (OUTPATIENT)
Dept: PSYCHOLOGY | Facility: CLINIC | Age: 73
End: 2023-03-23
Payer: COMMERCIAL

## 2023-03-23 DIAGNOSIS — F41.1 GAD (GENERALIZED ANXIETY DISORDER): Primary | ICD-10-CM

## 2023-03-23 PROCEDURE — 90834 PSYTX W PT 45 MINUTES: CPT | Mod: VID
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
## 2023-04-05 ENCOUNTER — VIRTUAL VISIT (OUTPATIENT)
Dept: PSYCHOLOGY | Facility: CLINIC | Age: 73
End: 2023-04-05
Payer: COMMERCIAL

## 2023-04-05 DIAGNOSIS — F41.1 GAD (GENERALIZED ANXIETY DISORDER): Primary | ICD-10-CM

## 2023-04-05 PROCEDURE — 90834 PSYTX W PT 45 MINUTES: CPT | Mod: VID
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
## 2023-04-27 ENCOUNTER — VIRTUAL VISIT (OUTPATIENT)
Dept: PSYCHOLOGY | Facility: CLINIC | Age: 73
End: 2023-04-27
Payer: COMMERCIAL

## 2023-04-27 DIAGNOSIS — F41.1 GAD (GENERALIZED ANXIETY DISORDER): Primary | ICD-10-CM

## 2023-04-27 PROCEDURE — 90834 PSYTX W PT 45 MINUTES: CPT | Mod: VID
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
## 2023-05-11 ENCOUNTER — VIRTUAL VISIT (OUTPATIENT)
Dept: PSYCHOLOGY | Facility: CLINIC | Age: 73
End: 2023-05-11
Payer: COMMERCIAL

## 2023-05-11 ENCOUNTER — PRE VISIT (OUTPATIENT)
Dept: UROLOGY | Facility: CLINIC | Age: 73
End: 2023-05-11
Payer: COMMERCIAL

## 2023-05-11 DIAGNOSIS — F41.1 GAD (GENERALIZED ANXIETY DISORDER): Primary | ICD-10-CM

## 2023-05-11 DIAGNOSIS — R39.12 BENIGN PROSTATIC HYPERPLASIA WITH WEAK URINARY STREAM: Primary | ICD-10-CM

## 2023-05-11 DIAGNOSIS — N40.1 BENIGN PROSTATIC HYPERPLASIA WITH WEAK URINARY STREAM: Primary | ICD-10-CM

## 2023-05-11 PROCEDURE — 90834 PSYTX W PT 45 MINUTES: CPT | Mod: VID

## 2023-05-11 NOTE — TELEPHONE ENCOUNTER
Reason for visit: 1 year follow up     Dx/Hx/Sx: BPH    Records/imaging/labs/orders: PSA in epic    At Rooming: standard
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
## 2023-06-01 ENCOUNTER — LAB (OUTPATIENT)
Dept: LAB | Facility: CLINIC | Age: 73
End: 2023-06-01
Payer: COMMERCIAL

## 2023-06-01 ENCOUNTER — MYC MEDICAL ADVICE (OUTPATIENT)
Dept: UROLOGY | Facility: CLINIC | Age: 73
End: 2023-06-01

## 2023-06-01 DIAGNOSIS — R39.12 BENIGN PROSTATIC HYPERPLASIA WITH WEAK URINARY STREAM: ICD-10-CM

## 2023-06-01 DIAGNOSIS — N40.1 BENIGN PROSTATIC HYPERPLASIA WITH WEAK URINARY STREAM: ICD-10-CM

## 2023-06-01 LAB — PSA SERPL DL<=0.01 NG/ML-MCNC: 2.66 NG/ML (ref 0–6.5)

## 2023-06-01 PROCEDURE — 84153 ASSAY OF PSA TOTAL: CPT | Performed by: PATHOLOGY

## 2023-06-01 PROCEDURE — 36415 COLL VENOUS BLD VENIPUNCTURE: CPT | Performed by: PATHOLOGY
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
## 2023-06-06 ENCOUNTER — OFFICE VISIT (OUTPATIENT)
Dept: UROLOGY | Facility: CLINIC | Age: 73
End: 2023-06-06
Payer: COMMERCIAL

## 2023-06-06 VITALS — WEIGHT: 175 LBS | BODY MASS INDEX: 25.92 KG/M2 | HEIGHT: 69 IN

## 2023-06-06 DIAGNOSIS — N40.1 BENIGN PROSTATIC HYPERPLASIA WITH WEAK URINARY STREAM: Primary | ICD-10-CM

## 2023-06-06 DIAGNOSIS — R39.12 BENIGN PROSTATIC HYPERPLASIA WITH WEAK URINARY STREAM: Primary | ICD-10-CM

## 2023-06-06 DIAGNOSIS — R20.8: ICD-10-CM

## 2023-06-06 PROCEDURE — 99213 OFFICE O/P EST LOW 20 MIN: CPT | Mod: 25 | Performed by: NURSE PRACTITIONER

## 2023-06-06 PROCEDURE — 51798 US URINE CAPACITY MEASURE: CPT | Performed by: NURSE PRACTITIONER

## 2023-06-06 RX ORDER — CLOTRIMAZOLE AND BETAMETHASONE DIPROPIONATE 10; .64 MG/G; MG/G
CREAM TOPICAL 2 TIMES DAILY
Qty: 45 G | Refills: 3 | Status: SHIPPED | OUTPATIENT
Start: 2023-06-06

## 2023-06-06 ASSESSMENT — PAIN SCALES - GENERAL: PAINLEVEL: NO PAIN (0)
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
## 2023-06-06 NOTE — NURSING NOTE
"Chief Complaint   Patient presents with     Follow Up     PSA check        Height 1.74 m (5' 8.5\"), weight 79.4 kg (175 lb). Body mass index is 26.22 kg/m .    Patient Active Problem List   Diagnosis     Carotid artery dissection (H)     History of TIA (transient ischemic attack) and stroke     Carly syndrome     Stroke (H)     Elevated cholesterol     GERD with esophagitis     Male erectile dysfunction     Occlusion and stenosis of carotid artery     Pain in shoulder     Carly's syndrome pupil       Allergies   Allergen Reactions     Metoclopramide Other (See Comments)     [\"Given for diagnosis of migraine, later disproved\"]   Dystonic reaction. Resolved with benadryl   seizure like reaction. [facility]#[id number]       Current Outpatient Medications   Medication Sig Dispense Refill     COVID-19 At-Home Test KIT 1 Box by In Vitro route as needed (signs/symptoms of COVID) 4 kit 0     sildenafil (VIAGRA) 50 MG tablet Take 1 tablet (50 mg) by mouth daily as needed (Erectile dysfunction) 30 tablet 5     tamsulosin (FLOMAX) 0.4 MG capsule Take 1 capsule (0.4 mg) by mouth daily 90 capsule 3     terbinafine (LAMISIL) 1 % cream Apply topically 2 times daily         Social History     Tobacco Use     Smoking status: Former     Types: Cigarettes     Quit date: 1983     Years since quittin.3     Smokeless tobacco: Never     Tobacco comments:     does not vape    Substance Use Topics     Alcohol use: Yes     Comment: beer 2 daily     Drug use: No       Gin [place]  2023  10:18 AM  "

## 2023-06-06 NOTE — PATIENT INSTRUCTIONS
UROLOGY CLINIC VISIT PATIENT INSTRUCTIONS    -Follow up for a nurse visit for intercavernosal injection teaching and first trial dose.   -I have also sent the Lotrisone prescription.   -Follow up with me in 6 months to check in.     If you have any issues, questions or concerns in the meantime, do not hesitate to contact us at [phone] or via [employer].     Darlene Sung, CNP  Department of Urology

## 2023-06-13 ENCOUNTER — OFFICE VISIT (OUTPATIENT)
Dept: UROLOGY | Facility: CLINIC | Age: 73
End: 2023-06-13
Payer: COMMERCIAL

## 2023-06-13 DIAGNOSIS — N52.9 ERECTILE DYSFUNCTION, UNSPECIFIED ERECTILE DYSFUNCTION TYPE: Primary | ICD-10-CM

## 2023-06-13 PROCEDURE — 99211 OFF/OP EST MAY X REQ PHY/QHP: CPT

## 2023-06-13 NOTE — PROGRESS NOTES
"No chief complaint on file.      Patient Active Problem List   Diagnosis     Carotid artery dissection (H)     History of TIA (transient ischemic attack) and stroke     Carly syndrome     Stroke (H)     Elevated cholesterol     GERD with esophagitis     Male erectile dysfunction     Occlusion and stenosis of carotid artery     Pain in shoulder     Carly's syndrome pupil       Allergies   Allergen Reactions     Metoclopramide Other (See Comments)     [\"Given for diagnosis of migraine, later disproved\"]   Dystonic reaction. Resolved with benadryl   seizure like reaction. [facility]#[id number]       Current Outpatient Medications   Medication Sig Dispense Refill     clotrimazole-betamethasone (LOTRISONE) 1-0.05 % external cream Apply topically 2 times daily For two weeks, then as-needed 45 g 3     COVID-19 At-Home Test KIT 1 Box by In Vitro route as needed (signs/symptoms of COVID) 4 kit 0     sildenafil (VIAGRA) 50 MG tablet Take 1 tablet (50 mg) by mouth daily as needed (Erectile dysfunction) 30 tablet 5     tamsulosin (FLOMAX) 0.4 MG capsule Take 1 capsule (0.4 mg) by mouth daily 90 capsule 3     terbinafine (LAMISIL) 1 % cream Apply topically 2 times daily         Social History     Tobacco Use     Smoking status: Former     Types: Cigarettes     Quit date: 1983     Years since quittin.3     Smokeless tobacco: Never     Tobacco comments:     does not vape    Substance Use Topics     Alcohol use: Yes     Comment: beer 2 daily     Drug use: No       Ronald Pearson comes into clinic today at the request of Ree Whitley for intracavernosal injection teaching.    Diagnosis: erectile dysfuntion    This service provided today was under the direct supervision of Dr Flor, who was available if needed.    Ronald Pearson presents to clinic for Edex injection teaching.  Edex informational patient packet was read and reviewed in clinic by patient.  Reviewed usage and possible side effects.  Questions answered " appropriately.  Patient injected 10 mcg with minimal assistance.  After 20 minutes, patient reassessed.  Patient rates his erection a 8/10. Patient stated that it would be firm enough for penetration.  Patient educated on alternating sites for injection, left and right side of shaft. Also discussed not to use more than 3 times per week, at least 24 hours between each injection. Also discussed prolonged erections and need to go to ER if that happens.  Patient verbalized understanding. No further questions.  Patient to call if he has any questions or concerns.      The following medication was given:     MEDICATION:  Edex  ROUTE: intracavernosal  SITE: right side of penis  DOSE: 10mcg  LOT #: [id number]  : [employer]  EXPIRATION DATE: 08/2023  NDC#: 20925-969-70   Was there drug waste? No    Prior to injection, verified patient identity using patient's name and date of birth.  Due to injection administration, patient instructed to remain in clinic for 15 minutes  afterwards, and to report any adverse reaction to me immediately.    Pt reached out to writer prior to completing his visit and stated that at this time, he would like to forego the prescription at this time. Pt states that he does not like the feeling it gave him in his penis despite the positive response he got from the medication. Writer will reach out to ordering provider.     Drug Amount Wasted:  None.  Vial/Syringe: Single dose vial      Mackenzie García RN

## 2023-06-14 ENCOUNTER — VIRTUAL VISIT (OUTPATIENT)
Dept: PSYCHOLOGY | Facility: CLINIC | Age: 73
End: 2023-06-14
Payer: COMMERCIAL

## 2023-06-14 DIAGNOSIS — F41.1 GAD (GENERALIZED ANXIETY DISORDER): Primary | ICD-10-CM

## 2023-06-14 PROCEDURE — 90834 PSYTX W PT 45 MINUTES: CPT | Mod: VID
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
## 2023-06-30 ENCOUNTER — VIRTUAL VISIT (OUTPATIENT)
Dept: PSYCHOLOGY | Facility: CLINIC | Age: 73
End: 2023-06-30
Payer: COMMERCIAL

## 2023-06-30 DIAGNOSIS — F41.1 GAD (GENERALIZED ANXIETY DISORDER): Primary | ICD-10-CM

## 2023-06-30 PROCEDURE — 90834 PSYTX W PT 45 MINUTES: CPT | Mod: VID
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
## 2023-07-11 ENCOUNTER — TELEPHONE (OUTPATIENT)
Dept: PSYCHOLOGY | Facility: CLINIC | Age: 73
End: 2023-07-11
Payer: COMMERCIAL

## 2023-07-11 NOTE — TELEPHONE ENCOUNTER
Client did not join video visit. This provider sent a text message invite and called patient five minutes after scheduled appointment start time. This provider left a message with [facility] appointment phone number, [phone] if client was having difficulties getting connected. Let patient know that provider would wait until fifteen minutes after scheduled start time for video appointment, after which point in time they would have to reschedule.       Eliana Lynch, BRIAN, LICSW

## 2023-08-02 ENCOUNTER — VIRTUAL VISIT (OUTPATIENT)
Dept: PSYCHOLOGY | Facility: CLINIC | Age: 73
End: 2023-08-02
Payer: COMMERCIAL

## 2023-08-02 DIAGNOSIS — F41.1 GAD (GENERALIZED ANXIETY DISORDER): Primary | ICD-10-CM

## 2023-08-02 PROCEDURE — 90834 PSYTX W PT 45 MINUTES: CPT | Mod: VID
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
## 2023-08-18 ENCOUNTER — VIRTUAL VISIT (OUTPATIENT)
Dept: PSYCHOLOGY | Facility: CLINIC | Age: 73
End: 2023-08-18
Payer: COMMERCIAL

## 2023-08-18 DIAGNOSIS — F41.1 GAD (GENERALIZED ANXIETY DISORDER): Primary | ICD-10-CM

## 2023-08-18 PROCEDURE — 90834 PSYTX W PT 45 MINUTES: CPT | Mod: VID
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
## 2023-09-03 ENCOUNTER — HEALTH MAINTENANCE LETTER (OUTPATIENT)
Age: 73
End: 2023-09-03

## 2023-09-11 ENCOUNTER — VIRTUAL VISIT (OUTPATIENT)
Dept: PSYCHOLOGY | Facility: CLINIC | Age: 73
End: 2023-09-11
Payer: COMMERCIAL

## 2023-09-11 DIAGNOSIS — F41.1 GAD (GENERALIZED ANXIETY DISORDER): Primary | ICD-10-CM

## 2023-09-11 PROCEDURE — 90834 PSYTX W PT 45 MINUTES: CPT | Mod: VID
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
## 2023-10-10 ENCOUNTER — OFFICE VISIT (OUTPATIENT)
Dept: FAMILY MEDICINE | Facility: CLINIC | Age: 73
End: 2023-10-10
Payer: COMMERCIAL

## 2023-10-10 VITALS
HEIGHT: 69 IN | OXYGEN SATURATION: 100 % | DIASTOLIC BLOOD PRESSURE: 86 MMHG | SYSTOLIC BLOOD PRESSURE: 150 MMHG | BODY MASS INDEX: 25.48 KG/M2 | WEIGHT: 172 LBS | HEART RATE: 60 BPM | TEMPERATURE: 98 F

## 2023-10-10 DIAGNOSIS — I86.1 LEFT VARICOCELE: ICD-10-CM

## 2023-10-10 DIAGNOSIS — Z01.818 PRE-OP EXAM: Primary | ICD-10-CM

## 2023-10-10 NOTE — NURSING NOTE
"ROOM:3  [facility]    Preferred Name: Harlan     How did you hear about us?  Current Patient    73 year old  Chief Complaint   Patient presents with     Pre-Op Exam       Blood pressure (!) 141/79, pulse 60, temperature 98  F (36.7  C), temperature source Oral, height 1.755 m (5' 9.1\"), weight 78 kg (172 lb), SpO2 100 %. Body mass index is 25.33 kg/m .  BP completed using cuff size:        Patient Active Problem List   Diagnosis     Carotid artery dissection (H24)     History of TIA (transient ischemic attack) and stroke     Carly syndrome     Stroke (H)     Elevated cholesterol     GERD with esophagitis     Male erectile dysfunction     Occlusion and stenosis of carotid artery     Pain in shoulder     Carly's syndrome pupil       Wt Readings from Last 2 Encounters:   10/10/23 78 kg (172 lb)   23 79.4 kg (175 lb)     BP Readings from Last 3 Encounters:   10/10/23 (!) 141/79   10/26/22 (!) 153/91   22 (!) 149/85       Allergies   Allergen Reactions     Metoclopramide Other (See Comments)     [\"Given for diagnosis of migraine, later disproved\"]   Dystonic reaction. Resolved with benadryl   seizure like reaction. LegacyRecord#[id number]       Current Outpatient Medications   Medication     clotrimazole-betamethasone (LOTRISONE) 1-0.05 % external cream     COVID-19 At-Home Test KIT     sildenafil (VIAGRA) 50 MG tablet     terbinafine (LAMISIL) 1 % cream     tamsulosin (FLOMAX) 0.4 MG capsule     Current Facility-Administered Medications   Medication     alprostadil (EDEX) kit 10 mcg       Social History     Tobacco Use     Smoking status: Former     Types: Cigarettes     Quit date: 1983     Years since quittin.7     Smokeless tobacco: Never     Tobacco comments:     does not vape    Substance Use Topics     Alcohol use: Yes     Comment: beer 2 daily     Drug use: No       Honoring Choices - Health Care Directive Guide offered to patient at time of visit.    Health Maintenance Due   Topic Date Due "     HEPATITIS C SCREENING  Never done     ZOSTER IMMUNIZATION (1 of 2) Never done     Pneumococcal Vaccine: 65+ Years (1 - PCV) Never done     MEDICARE ANNUAL WELLNESS VISIT  07/08/2022     INFLUENZA VACCINE (1) 09/01/2023     COVID-19 Vaccine (6 - 2023-24 season) 09/01/2023       Immunization History   Administered Date(s) Administered     COVID-19 Bivalent 18+ (Moderna) 01/19/2023     COVID-19 Monovalent 18+ (Moderna) 02/12/2021, 03/12/2021     COVID-19 Monovalent Booster 18+ (Moderna) 11/09/2021, 05/26/2022     FLU 6-35 months 11/23/2010     TDAP (Adacel,Boostrix) 04/05/2006, 04/15/2014     Td (Adult), Adsorbed 03/05/2003       No results found for: PAP    Recent Labs   Lab Test 04/20/22  1340 07/08/21  1340   A1C 5.5  --    LDL  --  120*   HDL  --  50   TRIG  --  104   ALT 26 23   CR 1.08 0.87   GFRESTIMATED 73 87   GFRESTBLACK  --  >90   ALBUMIN 3.5 3.6   POTASSIUM 5.2 4.0   TSH  --  1.97           6/29/2023    11:02 AM 6/14/2023     8:34 AM   PHQ-2 ( 1999 Pfizer)   Q1: Little interest or pleasure in doing things 0 0   Q2: Feeling down, depressed or hopeless 0 0   PHQ-2 Score 0 0   Q1: Little interest or pleasure in doing things Not at all Not at all   Q2: Feeling down, depressed or hopeless Not at all Not at all   PHQ-2 Score 0 0           7/8/2021     1:36 PM 8/20/2021     1:22 PM 11/10/2021     9:35 AM 11/29/2021     9:00 AM   PHQ-9 SCORE   PHQ-9 Total Score MyChart  2 (Minimal depression) 0    PHQ-9 Total Score 4 2 0 2           8/20/2021     1:24 PM 11/10/2021     9:36 AM 11/29/2021     9:00 AM   SAKSHI-7 SCORE   Total Score 4 (minimal anxiety) 0 (minimal anxiety)    Total Score 4 0 2            No data to display                Tu Sanchez    October 10, 2023 10:35 AM

## 2023-10-26 ENCOUNTER — VIRTUAL VISIT (OUTPATIENT)
Dept: PSYCHOLOGY | Facility: CLINIC | Age: 73
End: 2023-10-26
Payer: COMMERCIAL

## 2023-10-26 DIAGNOSIS — F41.1 GAD (GENERALIZED ANXIETY DISORDER): Primary | ICD-10-CM

## 2023-10-26 PROCEDURE — 90834 PSYTX W PT 45 MINUTES: CPT | Mod: VID
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
## 2023-11-16 ENCOUNTER — VIRTUAL VISIT (OUTPATIENT)
Dept: PSYCHOLOGY | Facility: CLINIC | Age: 73
End: 2023-11-16
Payer: COMMERCIAL

## 2023-11-16 DIAGNOSIS — F41.1 GAD (GENERALIZED ANXIETY DISORDER): Primary | ICD-10-CM

## 2023-11-16 PROCEDURE — 90834 PSYTX W PT 45 MINUTES: CPT | Mod: VID
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
## 2023-11-28 ENCOUNTER — PRE VISIT (OUTPATIENT)
Dept: UROLOGY | Facility: CLINIC | Age: 73
End: 2023-11-28
Payer: COMMERCIAL

## 2023-12-08 ENCOUNTER — VIRTUAL VISIT (OUTPATIENT)
Dept: PSYCHOLOGY | Facility: CLINIC | Age: 73
End: 2023-12-08
Payer: COMMERCIAL

## 2023-12-08 ENCOUNTER — TELEPHONE (OUTPATIENT)
Dept: UROLOGY | Facility: CLINIC | Age: 73
End: 2023-12-08

## 2023-12-08 DIAGNOSIS — F41.1 GAD (GENERALIZED ANXIETY DISORDER): Primary | ICD-10-CM

## 2023-12-08 PROCEDURE — 90834 PSYTX W PT 45 MINUTES: CPT | Mod: VID
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
## 2024-01-09 ENCOUNTER — VIRTUAL VISIT (OUTPATIENT)
Dept: PSYCHOLOGY | Facility: CLINIC | Age: 74
End: 2024-01-09
Payer: COMMERCIAL

## 2024-01-09 DIAGNOSIS — F41.1 GAD (GENERALIZED ANXIETY DISORDER): Primary | ICD-10-CM

## 2024-01-09 PROCEDURE — 90834 PSYTX W PT 45 MINUTES: CPT | Mod: 95

## 2024-01-24 ENCOUNTER — VIRTUAL VISIT (OUTPATIENT)
Dept: PSYCHOLOGY | Facility: CLINIC | Age: 74
End: 2024-01-24
Payer: COMMERCIAL

## 2024-01-24 DIAGNOSIS — F41.1 GAD (GENERALIZED ANXIETY DISORDER): Primary | ICD-10-CM

## 2024-01-24 PROCEDURE — 90834 PSYTX W PT 45 MINUTES: CPT | Mod: 95
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
## 2024-01-25 ENCOUNTER — OFFICE VISIT (OUTPATIENT)
Dept: FAMILY MEDICINE | Facility: CLINIC | Age: 74
End: 2024-01-25
Payer: COMMERCIAL

## 2024-01-25 VITALS
WEIGHT: 174.7 LBS | RESPIRATION RATE: 16 BRPM | TEMPERATURE: 98.1 F | DIASTOLIC BLOOD PRESSURE: 87 MMHG | OXYGEN SATURATION: 96 % | BODY MASS INDEX: 26.48 KG/M2 | HEART RATE: 95 BPM | SYSTOLIC BLOOD PRESSURE: 143 MMHG | HEIGHT: 68 IN

## 2024-01-25 DIAGNOSIS — Z00.00 HEALTHCARE MAINTENANCE: ICD-10-CM

## 2024-01-25 DIAGNOSIS — K13.70 MOUTH LESION: Primary | ICD-10-CM

## 2024-01-25 DIAGNOSIS — I10 MILD HYPERTENSION: ICD-10-CM

## 2024-01-25 DIAGNOSIS — R26.89 DECREASED MOBILITY: ICD-10-CM

## 2024-01-25 DIAGNOSIS — M54.2 NECK PAIN: ICD-10-CM

## 2024-01-25 DIAGNOSIS — R79.89 HIGH SERUM LOW-DENSITY LIPOPROTEIN (LDL): ICD-10-CM

## 2024-01-25 LAB
BASOPHILS # BLD AUTO: 0 10E3/UL (ref 0–0.2)
BASOPHILS NFR BLD AUTO: 0 %
EOSINOPHIL # BLD AUTO: 0.2 10E3/UL (ref 0–0.7)
EOSINOPHIL NFR BLD AUTO: 3 %
ERYTHROCYTE [DISTWIDTH] IN BLOOD BY AUTOMATED COUNT: 13 % (ref 10–15)
HBA1C MFR BLD: 5.8 %
HCT VFR BLD AUTO: 48.2 % (ref 40–53)
HGB BLD-MCNC: 16.1 G/DL (ref 13.3–17.7)
IMM GRANULOCYTES # BLD: 0 10E3/UL
IMM GRANULOCYTES NFR BLD: 0 %
LYMPHOCYTES # BLD AUTO: 1.6 10E3/UL (ref 0.8–5.3)
LYMPHOCYTES NFR BLD AUTO: 24 %
MCH RBC QN AUTO: 31.6 PG (ref 26.5–33)
MCHC RBC AUTO-ENTMCNC: 33.4 G/DL (ref 31.5–36.5)
MCV RBC AUTO: 95 FL (ref 78–100)
MONOCYTES # BLD AUTO: 0.7 10E3/UL (ref 0–1.3)
MONOCYTES NFR BLD AUTO: 10 %
NEUTROPHILS # BLD AUTO: 4.2 10E3/UL (ref 1.6–8.3)
NEUTROPHILS NFR BLD AUTO: 63 %
NRBC # BLD AUTO: 0 10E3/UL
NRBC BLD AUTO-RTO: 0 /100
PLATELET # BLD AUTO: 289 10E3/UL (ref 150–450)
RBC # BLD AUTO: 5.1 10E6/UL (ref 4.4–5.9)
WBC # BLD AUTO: 6.7 10E3/UL (ref 4–11)

## 2024-01-25 PROCEDURE — 85025 COMPLETE CBC W/AUTO DIFF WBC: CPT | Mod: ORL | Performed by: NURSE PRACTITIONER

## 2024-01-25 PROCEDURE — 80061 LIPID PANEL: CPT | Mod: ORL | Performed by: NURSE PRACTITIONER

## 2024-01-25 PROCEDURE — 84443 ASSAY THYROID STIM HORMONE: CPT | Mod: ORL | Performed by: NURSE PRACTITIONER

## 2024-01-25 PROCEDURE — 83036 HEMOGLOBIN GLYCOSYLATED A1C: CPT | Mod: ORL | Performed by: NURSE PRACTITIONER

## 2024-01-25 PROCEDURE — 80053 COMPREHEN METABOLIC PANEL: CPT | Mod: ORL | Performed by: NURSE PRACTITIONER

## 2024-01-25 ASSESSMENT — PAIN SCALES - GENERAL: PAINLEVEL: MODERATE PAIN (5)

## 2024-01-25 NOTE — NURSING NOTE
"ROOM:1  [facility]    Preferred Name: Harlan     How did you hear about us?  Current Patient    73 year old  Chief Complaint   Patient presents with     Neck Pain     Patient reports neck pain and a lump inside of mouth (left)      Hypertension     Referral       Blood pressure (!) 164/95, pulse 95, temperature 98.1  F (36.7  C), temperature source Oral, resp. rate 16, height 1.717 m (5' 7.6\"), weight 79.2 kg (174 lb 11.2 oz), SpO2 96%. Body mass index is 26.88 kg/m .  BP completed using cuff size:        Patient Active Problem List   Diagnosis     Carotid artery dissection (H24)     History of TIA (transient ischemic attack) and stroke     Carly syndrome     Stroke (H)     Elevated cholesterol     GERD with esophagitis     Male erectile dysfunction     Occlusion and stenosis of carotid artery     Pain in shoulder     Carly's syndrome pupil     Left varicocele       Wt Readings from Last 2 Encounters:   24 79.2 kg (174 lb 11.2 oz)   10/10/23 78 kg (172 lb)     BP Readings from Last 3 Encounters:   24 (!) 164/95   10/10/23 (!) 150/86   10/26/22 (!) 153/91       Allergies   Allergen Reactions     Metoclopramide Other (See Comments)     [\"Given for diagnosis of migraine, later disproved\"]   Dystonic reaction. Resolved with benadryl   seizure like reaction. LegacyRecord#[id number]       Current Outpatient Medications   Medication     clotrimazole-betamethasone (LOTRISONE) 1-0.05 % external cream     sildenafil (VIAGRA) 50 MG tablet     terbinafine (LAMISIL) 1 % cream     COVID-19 At-Home Test KIT     Current Facility-Administered Medications   Medication     alprostadil (EDEX) kit 10 mcg       Social History     Tobacco Use     Smoking status: Former     Types: Cigarettes     Quit date: 1983     Years since quittin.0     Smokeless tobacco: Never     Tobacco comments:     does not vape    Substance Use Topics     Alcohol use: Yes     Comment: beer 2 daily     Drug use: No       Honoring Choices - " "Health Care Directive Guide offered to patient at time of visit.    Health Maintenance Due   Topic Date Due     HEPATITIS C SCREENING  Never done     ZOSTER IMMUNIZATION (1 of 2) Never done     RSV VACCINE (Pregnancy & 60+) (1 - 1-dose 60+ series) Never done     Pneumococcal Vaccine: 65+ Years (1 of 1 - PCV) Never done     MEDICARE ANNUAL WELLNESS VISIT  07/08/2022     INFLUENZA VACCINE (1) 09/01/2023     COVID-19 Vaccine (6 - 2023-24 season) 09/01/2023       Immunization History   Administered Date(s) Administered     COVID-19 Bivalent 18+ (Moderna) 01/19/2023     COVID-19 Monovalent 18+ (Moderna) 02/12/2021, 03/12/2021     COVID-19 Monovalent Booster 18+ (Moderna) 11/09/2021, 05/26/2022     Influenza, seasonal, injectable, PF 11/23/2010     TDAP (Adacel,Boostrix) 04/05/2006, 04/15/2014     Td (Adult), Adsorbed 03/05/2003       No results found for: \"PAP\"    Recent Labs   Lab Test 04/20/22  1340 07/08/21  1340   A1C 5.5  --    LDL  --  120*   HDL  --  50   TRIG  --  104   ALT 26 23   CR 1.08 0.87   GFRESTIMATED 73 87   GFRESTBLACK  --  >90   ALBUMIN 3.5 3.6   POTASSIUM 5.2 4.0   TSH  --  1.97           1/24/2024     8:31 AM 1/22/2024     3:49 PM   PHQ-2 ( 1999 Pfizer)   Q1: Little interest or pleasure in doing things 0 0   Q2: Feeling down, depressed or hopeless 0 0   PHQ-2 Score 0 0   Q1: Little interest or pleasure in doing things Not at all Not at all   Q2: Feeling down, depressed or hopeless Not at all Not at all   PHQ-2 Score 0 0           7/8/2021     1:36 PM 8/20/2021     1:22 PM 11/10/2021     9:35 AM 11/29/2021     9:00 AM   PHQ-9 SCORE   PHQ-9 Total Score MyChart  2 (Minimal depression) 0    PHQ-9 Total Score 4 2 0 2           8/20/2021     1:24 PM 11/10/2021     9:36 AM 11/29/2021     9:00 AM   SAKSHI-7 SCORE   Total Score 4 (minimal anxiety) 0 (minimal anxiety)    Total Score 4 0 2            No data to display                Nneka Tejada    January 25, 2024 11:04 AM    "

## 2024-01-26 LAB
ALBUMIN SERPL BCG-MCNC: 4.1 G/DL (ref 3.5–5.2)
ALP SERPL-CCNC: 77 U/L (ref 40–150)
ALT SERPL W P-5'-P-CCNC: 20 U/L (ref 0–70)
ANION GAP SERPL CALCULATED.3IONS-SCNC: 9 MMOL/L (ref 7–15)
AST SERPL W P-5'-P-CCNC: 23 U/L (ref 0–45)
BILIRUB SERPL-MCNC: 0.5 MG/DL
BUN SERPL-MCNC: 17.3 MG/DL (ref 8–23)
CALCIUM SERPL-MCNC: 9.4 MG/DL (ref 8.8–10.2)
CHLORIDE SERPL-SCNC: 103 MMOL/L (ref 98–107)
CHOLEST SERPL-MCNC: 227 MG/DL
CREAT SERPL-MCNC: 0.95 MG/DL (ref 0.67–1.17)
DEPRECATED HCO3 PLAS-SCNC: 27 MMOL/L (ref 22–29)
EGFRCR SERPLBLD CKD-EPI 2021: 85 ML/MIN/1.73M2
FASTING STATUS PATIENT QL REPORTED: ABNORMAL
GLUCOSE SERPL-MCNC: 108 MG/DL (ref 70–99)
HDLC SERPL-MCNC: 45 MG/DL
LDLC SERPL CALC-MCNC: 146 MG/DL
NONHDLC SERPL-MCNC: 182 MG/DL
POTASSIUM SERPL-SCNC: 4.2 MMOL/L (ref 3.4–5.3)
PROT SERPL-MCNC: 7.1 G/DL (ref 6.4–8.3)
SODIUM SERPL-SCNC: 139 MMOL/L (ref 135–145)
TRIGL SERPL-MCNC: 178 MG/DL
TSH SERPL DL<=0.005 MIU/L-ACNC: 2.28 UIU/ML (ref 0.3–4.2)

## 2024-01-29 NOTE — TELEPHONE ENCOUNTER
"FUTURE VISIT INFORMATION      FUTURE VISIT INFORMATION:  Date: 2/20/24  Time: 9am  Location: [facility]  REFERRAL INFORMATION:  Referring provider:  Ree Whitley, NP   Referring providers clinic:  [facility]   Reason for visit/diagnosis  Per Pt, dx Mouth lesion [K13.70] referral from PCP recs in epic macy per Michelle     RECORDS REQUESTED FROM:       Clinic name Comments Records Status Imaging Status   [facility]  1/25/24- ov Ree Whitley, NP  Epic             \"Please notify/message CSS if patient completed outside imaging prior to scheduled appointment and/or any outside records that might have been missed at pre visit -Thank you\"  "

## 2024-02-01 ENCOUNTER — VIRTUAL VISIT (OUTPATIENT)
Dept: FAMILY MEDICINE | Facility: CLINIC | Age: 74
End: 2024-02-01
Payer: COMMERCIAL

## 2024-02-01 DIAGNOSIS — R73.03 PREDIABETES: ICD-10-CM

## 2024-02-01 DIAGNOSIS — R53.83 OTHER FATIGUE: Primary | ICD-10-CM

## 2024-02-01 DIAGNOSIS — E78.5 HYPERLIPIDEMIA LDL GOAL <100: ICD-10-CM
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
## 2024-02-01 NOTE — PROGRESS NOTES
"  Harlan is a 73 year old who is being evaluated via a billable telephone visit.      What phone number would you like to be contacted at? [phone]  How would you like to obtain your AVS? David    Distant Location (provider location):  On-site    Assessment & Plan     Other fatigue    - Testosterone Free and Total; Future    Hyperlipidemia LDL goal <100    - Lipid panel reflex to direct LDL Fasting; Future    Prediabetes    - Hemoglobin A1c; Future    Harlan will follow up in 4 months after lifestyle modifications and that time determine if he may need a medication.    I recommended that Harlan call the PT department and get on a cancellation list.    BMI  Estimated body mass index is 26.88 kg/m  as calculated from the following:    Height as of 1/25/24: 1.717 m (5' 7.6\").    Weight as of 1/25/24: 79.2 kg (174 lb 11.2 oz).     No follow-ups on file.    Subjective   Harlan is a 73 year old, presenting for the following health issues:  Hyperlipidemia, prediabetes.  We are following up on recent labs.  He is experiencing fatigue and had requested a testosterone level at the previous visit that was not ordered/drawn.     We were also concerned about a lesion in Harlan's lower jaw that is described at his visit of 1/25/24.  We made a referral for him to ENT, he will be seen on 2/20/24.  He continues to have back spasms, he is scheduled to see PT but unable to get in for another month.    Review of Systems  CONSTITUTIONAL: NEGATIVE for fever, chills, change in weight  ENT/MOUTH: NEGATIVE for ear, and throat problems  RESP: NEGATIVE for significant cough or SOB  CV: NEGATIVE for chest pain, palpitations or peripheral edema      Objective       Vitals:  No vitals were obtained today due to virtual visit.    Physical Exam   General: Alert and no distress //Respiratory: No audible wheeze, cough, or shortness of breath // Psychiatric:  Appropriate affect, tone, and pace of words    Phone call duration: 10 minutes  Signed " Electronically by: Ree Whitley, NP

## 2024-02-20 ENCOUNTER — PRE VISIT (OUTPATIENT)
Dept: OTOLARYNGOLOGY | Facility: CLINIC | Age: 74
End: 2024-02-20

## 2024-02-20 ENCOUNTER — OFFICE VISIT (OUTPATIENT)
Dept: OTOLARYNGOLOGY | Facility: CLINIC | Age: 74
End: 2024-02-20
Attending: OTOLARYNGOLOGY
Payer: COMMERCIAL

## 2024-02-20 VITALS
OXYGEN SATURATION: 97 % | DIASTOLIC BLOOD PRESSURE: 85 MMHG | BODY MASS INDEX: 26.23 KG/M2 | HEART RATE: 75 BPM | WEIGHT: 177.1 LBS | HEIGHT: 69 IN | SYSTOLIC BLOOD PRESSURE: 158 MMHG

## 2024-02-20 DIAGNOSIS — K13.70 MOUTH LESION: ICD-10-CM

## 2024-02-20 PROCEDURE — 99203 OFFICE O/P NEW LOW 30 MIN: CPT | Performed by: OTOLARYNGOLOGY

## 2024-02-20 ASSESSMENT — PAIN SCALES - GENERAL: PAINLEVEL: MODERATE PAIN (4)
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
## 2024-02-20 NOTE — PATIENT INSTRUCTIONS
"You were seen in the clinic today by Dr. Melendrez. If you have any questions or concerns after your appointment, please call the clinic at [phone]. Press \"1\" for scheduling, press \"3\" for nurse advice.      2.   Plan to return to the clinic in 12-18 months     Mariann HART, RN  [facility]  Department of Otolaryngology  [phone]     "
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
## 2024-02-29 ENCOUNTER — VIRTUAL VISIT (OUTPATIENT)
Dept: PSYCHOLOGY | Facility: CLINIC | Age: 74
End: 2024-02-29
Payer: COMMERCIAL

## 2024-02-29 DIAGNOSIS — F41.1 GAD (GENERALIZED ANXIETY DISORDER): Primary | ICD-10-CM

## 2024-02-29 PROCEDURE — 90834 PSYTX W PT 45 MINUTES: CPT | Mod: 95
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
## 2024-03-14 ENCOUNTER — THERAPY VISIT (OUTPATIENT)
Dept: PHYSICAL THERAPY | Facility: CLINIC | Age: 74
End: 2024-03-14
Attending: NURSE PRACTITIONER
Payer: COMMERCIAL

## 2024-03-14 DIAGNOSIS — M54.2 NECK PAIN: Primary | ICD-10-CM

## 2024-03-14 PROCEDURE — 97110 THERAPEUTIC EXERCISES: CPT | Mod: GP | Performed by: PHYSICAL THERAPIST

## 2024-03-14 PROCEDURE — 97161 PT EVAL LOW COMPLEX 20 MIN: CPT | Mod: GP | Performed by: PHYSICAL THERAPIST
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
## 2024-03-19 NOTE — NURSING NOTE
Chief Complaint   Patient presents with     RECHECK     BPH/LUTS     Talya Gomez, [facility]    Pt arrived in GI Phase II.  Alert and oriented to self and hospital.  C/o back pain.  Warm blankets provided and realigned in bed.  Pt states that she feels better.  V/s monitored.  MD to see patient to discuss findings and plan of care.  Report given to floor nurse.  Transport requested.

## 2024-03-20 ENCOUNTER — TELEPHONE (OUTPATIENT)
Dept: NUTRITION | Facility: CLINIC | Age: 74
End: 2024-03-20
Payer: COMMERCIAL

## 2024-03-20 NOTE — PROGRESS NOTES
Appointment scheduled for video visit. Due to Medicare replacement insurance asked patient if checked insurance coverage as Medicare only covers nutrition for diabetes and renal disease and replacement coverage follows Medicare guidelines.  Patient called insurance provider and their system was down so patient unable to verify coverage.  Patient to call RD with insurance coverage update.  Patient verbalized understanding of plan.    María Cruz, RD, LD  [facility] Outpatient Dietitian  [phone] (office phone)

## 2024-03-28 ENCOUNTER — VIRTUAL VISIT (OUTPATIENT)
Dept: PSYCHOLOGY | Facility: CLINIC | Age: 74
End: 2024-03-28
Payer: COMMERCIAL

## 2024-03-28 DIAGNOSIS — F41.1 GAD (GENERALIZED ANXIETY DISORDER): Primary | ICD-10-CM

## 2024-03-28 PROCEDURE — 90834 PSYTX W PT 45 MINUTES: CPT | Mod: 95
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
## 2024-04-04 ENCOUNTER — THERAPY VISIT (OUTPATIENT)
Dept: PHYSICAL THERAPY | Facility: CLINIC | Age: 74
End: 2024-04-04
Payer: COMMERCIAL

## 2024-04-04 DIAGNOSIS — M54.2 NECK PAIN: Primary | ICD-10-CM

## 2024-04-04 PROCEDURE — 97110 THERAPEUTIC EXERCISES: CPT | Mod: GP | Performed by: PHYSICAL THERAPIST

## 2024-04-04 PROCEDURE — 97140 MANUAL THERAPY 1/> REGIONS: CPT | Mod: GP | Performed by: PHYSICAL THERAPIST

## 2024-04-10 ENCOUNTER — THERAPY VISIT (OUTPATIENT)
Dept: PHYSICAL THERAPY | Facility: CLINIC | Age: 74
End: 2024-04-10
Payer: COMMERCIAL

## 2024-04-10 DIAGNOSIS — M54.2 NECK PAIN: Primary | ICD-10-CM

## 2024-04-10 PROCEDURE — 97140 MANUAL THERAPY 1/> REGIONS: CPT | Mod: GP | Performed by: PHYSICAL THERAPIST

## 2024-04-10 PROCEDURE — 97110 THERAPEUTIC EXERCISES: CPT | Mod: GP | Performed by: PHYSICAL THERAPIST

## 2024-04-18 ENCOUNTER — VIRTUAL VISIT (OUTPATIENT)
Dept: PSYCHOLOGY | Facility: CLINIC | Age: 74
End: 2024-04-18
Payer: COMMERCIAL

## 2024-04-18 DIAGNOSIS — F41.1 GAD (GENERALIZED ANXIETY DISORDER): Primary | ICD-10-CM

## 2024-04-18 PROCEDURE — 90834 PSYTX W PT 45 MINUTES: CPT | Mod: 95
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
## 2024-05-07 ENCOUNTER — VIRTUAL VISIT (OUTPATIENT)
Dept: PSYCHOLOGY | Facility: CLINIC | Age: 74
End: 2024-05-07
Payer: COMMERCIAL

## 2024-05-07 DIAGNOSIS — F41.1 GAD (GENERALIZED ANXIETY DISORDER): Primary | ICD-10-CM

## 2024-05-07 PROCEDURE — 90791 PSYCH DIAGNOSTIC EVALUATION: CPT | Mod: 95

## 2024-05-07 ASSESSMENT — ANXIETY QUESTIONNAIRES
7. FEELING AFRAID AS IF SOMETHING AWFUL MIGHT HAPPEN: NOT AT ALL
8. IF YOU CHECKED OFF ANY PROBLEMS, HOW DIFFICULT HAVE THESE MADE IT FOR YOU TO DO YOUR WORK, TAKE CARE OF THINGS AT HOME, OR GET ALONG WITH OTHER PEOPLE?: NOT DIFFICULT AT ALL
6. BECOMING EASILY ANNOYED OR IRRITABLE: NOT AT ALL
IF YOU CHECKED OFF ANY PROBLEMS ON THIS QUESTIONNAIRE, HOW DIFFICULT HAVE THESE PROBLEMS MADE IT FOR YOU TO DO YOUR WORK, TAKE CARE OF THINGS AT HOME, OR GET ALONG WITH OTHER PEOPLE: NOT DIFFICULT AT ALL
7. FEELING AFRAID AS IF SOMETHING AWFUL MIGHT HAPPEN: NOT AT ALL
GAD7 TOTAL SCORE: 0
GAD7 TOTAL SCORE: 0
5. BEING SO RESTLESS THAT IT IS HARD TO SIT STILL: NOT AT ALL
4. TROUBLE RELAXING: NOT AT ALL
3. WORRYING TOO MUCH ABOUT DIFFERENT THINGS: NOT AT ALL
GAD7 TOTAL SCORE: 0
1. FEELING NERVOUS, ANXIOUS, OR ON EDGE: NOT AT ALL
2. NOT BEING ABLE TO STOP OR CONTROL WORRYING: NOT AT ALL

## 2024-05-07 ASSESSMENT — COLUMBIA-SUICIDE SEVERITY RATING SCALE - C-SSRS
6. HAVE YOU EVER DONE ANYTHING, STARTED TO DO ANYTHING, OR PREPARED TO DO ANYTHING TO END YOUR LIFE?: NO
2. HAVE YOU ACTUALLY HAD ANY THOUGHTS OF KILLING YOURSELF?: NO
ATTEMPT LIFETIME: NO
TOTAL  NUMBER OF INTERRUPTED ATTEMPTS LIFETIME: NO
1. HAVE YOU WISHED YOU WERE DEAD OR WISHED YOU COULD GO TO SLEEP AND NOT WAKE UP?: NO
TOTAL  NUMBER OF ABORTED OR SELF INTERRUPTED ATTEMPTS LIFETIME: NO

## 2024-05-07 ASSESSMENT — PATIENT HEALTH QUESTIONNAIRE - PHQ9
SUM OF ALL RESPONSES TO PHQ QUESTIONS 1-9: 0
10. IF YOU CHECKED OFF ANY PROBLEMS, HOW DIFFICULT HAVE THESE PROBLEMS MADE IT FOR YOU TO DO YOUR WORK, TAKE CARE OF THINGS AT HOME, OR GET ALONG WITH OTHER PEOPLE: NOT DIFFICULT AT ALL
SUM OF ALL RESPONSES TO PHQ QUESTIONS 1-9: 0
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
## 2024-05-16 ENCOUNTER — VIRTUAL VISIT (OUTPATIENT)
Dept: PALLIATIVE MEDICINE | Facility: OTHER | Age: 74
End: 2024-05-16
Payer: COMMERCIAL

## 2024-05-16 DIAGNOSIS — F41.1 GENERALIZED ANXIETY DISORDER: Primary | ICD-10-CM

## 2024-05-16 PROCEDURE — 90837 PSYTX W PT 60 MINUTES: CPT | Mod: 95 | Performed by: SOCIAL WORKER
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
## 2024-06-18 ENCOUNTER — OFFICE VISIT (OUTPATIENT)
Dept: PALLIATIVE MEDICINE | Facility: OTHER | Age: 74
End: 2024-06-18
Payer: COMMERCIAL

## 2024-06-18 DIAGNOSIS — F41.1 GENERALIZED ANXIETY DISORDER: Primary | ICD-10-CM

## 2024-06-18 PROCEDURE — 90837 PSYTX W PT 60 MINUTES: CPT | Performed by: SOCIAL WORKER

## 2024-06-18 ASSESSMENT — ANXIETY QUESTIONNAIRES
7. FEELING AFRAID AS IF SOMETHING AWFUL MIGHT HAPPEN: NOT AT ALL
1. FEELING NERVOUS, ANXIOUS, OR ON EDGE: NOT AT ALL
IF YOU CHECKED OFF ANY PROBLEMS ON THIS QUESTIONNAIRE, HOW DIFFICULT HAVE THESE PROBLEMS MADE IT FOR YOU TO DO YOUR WORK, TAKE CARE OF THINGS AT HOME, OR GET ALONG WITH OTHER PEOPLE: NOT DIFFICULT AT ALL
8. IF YOU CHECKED OFF ANY PROBLEMS, HOW DIFFICULT HAVE THESE MADE IT FOR YOU TO DO YOUR WORK, TAKE CARE OF THINGS AT HOME, OR GET ALONG WITH OTHER PEOPLE?: NOT DIFFICULT AT ALL
GAD7 TOTAL SCORE: 1
4. TROUBLE RELAXING: NOT AT ALL
6. BECOMING EASILY ANNOYED OR IRRITABLE: NOT AT ALL
GAD7 TOTAL SCORE: 1
GAD7 TOTAL SCORE: 1
7. FEELING AFRAID AS IF SOMETHING AWFUL MIGHT HAPPEN: NOT AT ALL
5. BEING SO RESTLESS THAT IT IS HARD TO SIT STILL: NOT AT ALL
3. WORRYING TOO MUCH ABOUT DIFFERENT THINGS: SEVERAL DAYS
2. NOT BEING ABLE TO STOP OR CONTROL WORRYING: NOT AT ALL
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
## 2024-07-09 ENCOUNTER — OFFICE VISIT (OUTPATIENT)
Dept: PALLIATIVE MEDICINE | Facility: OTHER | Age: 74
End: 2024-07-09
Payer: COMMERCIAL

## 2024-07-09 DIAGNOSIS — F41.1 GENERALIZED ANXIETY DISORDER: Primary | ICD-10-CM

## 2024-07-09 PROCEDURE — 90837 PSYTX W PT 60 MINUTES: CPT | Performed by: SOCIAL WORKER
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
## 2024-08-02 DIAGNOSIS — R97.20 ELEVATED PROSTATE SPECIFIC ANTIGEN (PSA): ICD-10-CM

## 2024-08-02 DIAGNOSIS — N52.9 ERECTILE DYSFUNCTION, UNSPECIFIED ERECTILE DYSFUNCTION TYPE: Primary | ICD-10-CM

## 2024-08-14 ENCOUNTER — PRE VISIT (OUTPATIENT)
Dept: UROLOGY | Facility: CLINIC | Age: 74
End: 2024-08-14
Payer: COMMERCIAL

## 2024-08-14 NOTE — TELEPHONE ENCOUNTER
Reason for visit: follow-up     Relevant information: 1 yr, BPH + weak stream. Med check    Records/imaging/labs/orders: all labs scheduled/available    Pt called: No need for a call    At Rooming: standard procedure    Rose Waller  8/14/2024  11:54 AM

## 2024-08-26 DIAGNOSIS — Z00.00 HEALTHCARE MAINTENANCE: Primary | ICD-10-CM

## 2024-09-03 ENCOUNTER — LAB (OUTPATIENT)
Dept: LAB | Facility: CLINIC | Age: 74
End: 2024-09-03
Attending: NURSE PRACTITIONER
Payer: COMMERCIAL

## 2024-09-03 DIAGNOSIS — R53.83 OTHER FATIGUE: ICD-10-CM

## 2024-09-03 DIAGNOSIS — R97.20 ELEVATED PROSTATE SPECIFIC ANTIGEN (PSA): ICD-10-CM

## 2024-09-03 LAB
PSA SERPL DL<=0.01 NG/ML-MCNC: 3.31 NG/ML (ref 0–6.5)
SHBG SERPL-SCNC: 88 NMOL/L (ref 11–80)

## 2024-09-03 PROCEDURE — 84153 ASSAY OF PSA TOTAL: CPT | Performed by: PATHOLOGY

## 2024-09-03 PROCEDURE — 84270 ASSAY OF SEX HORMONE GLOBUL: CPT | Performed by: NURSE PRACTITIONER

## 2024-09-03 PROCEDURE — 84403 ASSAY OF TOTAL TESTOSTERONE: CPT | Performed by: NURSE PRACTITIONER

## 2024-09-03 PROCEDURE — 99000 SPECIMEN HANDLING OFFICE-LAB: CPT | Performed by: PATHOLOGY

## 2024-09-03 PROCEDURE — 36415 COLL VENOUS BLD VENIPUNCTURE: CPT | Performed by: PATHOLOGY

## 2024-09-04 ENCOUNTER — VIRTUAL VISIT (OUTPATIENT)
Dept: PALLIATIVE MEDICINE | Facility: OTHER | Age: 74
End: 2024-09-04
Payer: COMMERCIAL

## 2024-09-04 DIAGNOSIS — F41.1 GENERALIZED ANXIETY DISORDER: Primary | ICD-10-CM

## 2024-09-04 LAB
TESTOST FREE SERPL-MCNC: 5.92 NG/DL
TESTOST SERPL-MCNC: 571 NG/DL (ref 240–950)

## 2024-09-04 PROCEDURE — 90834 PSYTX W PT 45 MINUTES: CPT | Mod: 95 | Performed by: SOCIAL WORKER
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
## 2024-09-10 ENCOUNTER — OFFICE VISIT (OUTPATIENT)
Dept: UROLOGY | Facility: CLINIC | Age: 74
End: 2024-09-10
Payer: COMMERCIAL

## 2024-09-10 VITALS
BODY MASS INDEX: 26.22 KG/M2 | OXYGEN SATURATION: 100 % | HEIGHT: 69 IN | DIASTOLIC BLOOD PRESSURE: 91 MMHG | SYSTOLIC BLOOD PRESSURE: 156 MMHG | RESPIRATION RATE: 12 BRPM | HEART RATE: 68 BPM | WEIGHT: 177 LBS

## 2024-09-10 DIAGNOSIS — R39.11 BENIGN PROSTATIC HYPERPLASIA WITH URINARY HESITANCY: Primary | ICD-10-CM

## 2024-09-10 DIAGNOSIS — N52.9 ERECTILE DYSFUNCTION, UNSPECIFIED ERECTILE DYSFUNCTION TYPE: ICD-10-CM

## 2024-09-10 DIAGNOSIS — N40.1 BENIGN PROSTATIC HYPERPLASIA WITH URINARY HESITANCY: Primary | ICD-10-CM

## 2024-09-10 PROCEDURE — 99213 OFFICE O/P EST LOW 20 MIN: CPT | Mod: 25 | Performed by: NURSE PRACTITIONER

## 2024-09-10 PROCEDURE — 51798 US URINE CAPACITY MEASURE: CPT | Performed by: NURSE PRACTITIONER

## 2024-09-10 RX ORDER — SILDENAFIL 50 MG/1
50 TABLET, FILM COATED ORAL DAILY PRN
Qty: 30 TABLET | Refills: 11 | Status: SHIPPED | OUTPATIENT
Start: 2024-09-10

## 2024-09-10 ASSESSMENT — PAIN SCALES - GENERAL: PAINLEVEL: NO PAIN (0)

## 2024-09-10 NOTE — PATIENT INSTRUCTIONS
UROLOGY CLINIC VISIT PATIENT INSTRUCTIONS    -Continue the sildenafil as needed. Refills sent today. Let me know if interested in trying the tadalafil (Cialis) in the future.   -Follow up with me in one year with a PSA checked beforehand.     If you have any issues, questions or concerns in the meantime, do not hesitate to contact us at [phone] or via dooub.     Darlene Sung, CNP  Department of Urology
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
## 2024-09-23 ENCOUNTER — OFFICE VISIT (OUTPATIENT)
Dept: PSYCHOLOGY | Facility: CLINIC | Age: 74
End: 2024-09-23
Payer: COMMERCIAL

## 2024-09-23 DIAGNOSIS — F41.1 GAD (GENERALIZED ANXIETY DISORDER): Primary | ICD-10-CM

## 2024-09-23 PROCEDURE — 90834 PSYTX W PT 45 MINUTES: CPT
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
## 2024-10-10 ENCOUNTER — TELEPHONE (OUTPATIENT)
Dept: GASTROENTEROLOGY | Facility: CLINIC | Age: 74
End: 2024-10-10
Payer: COMMERCIAL

## 2024-10-10 NOTE — TELEPHONE ENCOUNTER
"Endoscopy Scheduling Screen    Have you had any respiratory illness or flu-like symptoms in the last 10 days?  No    What is your communication preference for Instructions and/or Bowel Prep?   MyChart    What insurance is in the chart?  Other:  [employer]    Ordering/Referring Provider: LOTUS MARIE   (If ordering provider performs procedure, schedule with ordering provider unless otherwise instructed. )    BMI: Estimated body mass index is 26.14 kg/m  as calculated from the following:    Height as of 9/10/24: 1.753 m (5' 9\").    Weight as of 9/10/24: 80.3 kg (177 lb).     Sedation Ordered  moderate sedation.   If patient BMI > 50 do not schedule in [facility].    If patient BMI > 45 do not schedule at [facility].    Are you taking methadone or Suboxone?  NO, No RN review required.    Have you been diagnosed and are being treated for severe PTSD or severe anxiety?  NO, No RN review required.    Are you taking any prescription medications for pain 3 or more times per week?   NO, No RN review required.    Do you have a history of malignant hyperthermia?  No    (Females) Are you currently pregnant?   No     Have you been diagnosed or told you have pulmonary hypertension?   No    Do you have an LVAD?  No    Have you been told you have moderate to severe sleep apnea?  No.    Have you been told you have COPD, asthma, or any other lung disease?  No    Do you have any heart conditions?  No     Have you ever had or are you waiting for an organ transplant?  No. Continue scheduling, no site restrictions.    Have you had a stroke or transient ischemic attack (TIA aka \"mini stroke\" in the last 6 months?   No 2015    Have you been diagnosed with or been told you have cirrhosis of the liver?   No.    Are you currently on dialysis?   No    Do you need assistance transferring?   No    BMI: Estimated body mass index is 26.14 kg/m  as calculated from the following:    Height as of 9/10/24: 1.753 m (5' 9\").    Weight as of 9/10/24: 80.3 kg " (177 lb).     Is patients BMI > 40 and scheduling location [facility]?  No    Do you take an injectable or oral medication for weight loss or diabetes (excluding insulin)?  No    Do you take the medication Naltrexone?  No    Do you take blood thinners?  No       Prep   Are you currently on dialysis or do you have chronic kidney disease?  No    Do you have a diagnosis of diabetes?  No    Do you have a diagnosis of cystic fibrosis (CF)?  No    On a regular basis do you go 3 -5 days between bowel movements?  No    BMI > 40?  No    Preferred Pharmacy:    [id number] - [place], [place] - [address]  [address] [address]  Phone: [phone] Fax: [phone]    Final Scheduling Details     Procedure scheduled  Colonoscopy    Surgeon:  HILLARY     Date of procedure:  11/25/24     Pre-OP / PAC:   No - Not required for this site.    Location  [facility] - [facility] - Per order.    Sedation   Moderate Sedation - Per order.      Patient Reminders:   You will receive a call from a Nurse to review instructions and health history.  This assessment must be completed prior to your procedure.  Failure to complete the Nurse assessment may result in the procedure being cancelled.      On the day of your procedure, please designate an adult(s) who can drive you home stay with you for the next 24 hours. The medicines used in the exam will make you sleepy. You will not be able to drive.      You cannot take public transportation, ride share services, or non-medical taxi service without a responsible caregiver.  Medical transport services are allowed with the requirement that a responsible caregiver will receive you at your destination.  We require that drivers and caregivers are confirmed prior to your procedure.

## 2024-10-27 ENCOUNTER — HEALTH MAINTENANCE LETTER (OUTPATIENT)
Age: 74
End: 2024-10-27

## 2024-10-28 ENCOUNTER — OFFICE VISIT (OUTPATIENT)
Dept: PSYCHOLOGY | Facility: CLINIC | Age: 74
End: 2024-10-28
Payer: COMMERCIAL

## 2024-10-28 DIAGNOSIS — F41.1 GAD (GENERALIZED ANXIETY DISORDER): Primary | ICD-10-CM

## 2024-10-28 PROCEDURE — 90834 PSYTX W PT 45 MINUTES: CPT
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
## 2024-11-24 RX ORDER — NALOXONE HYDROCHLORIDE 0.4 MG/ML
0.2 INJECTION, SOLUTION INTRAMUSCULAR; INTRAVENOUS; SUBCUTANEOUS
Status: CANCELLED | OUTPATIENT
Start: 2024-11-24

## 2024-11-24 RX ORDER — NALOXONE HYDROCHLORIDE 0.4 MG/ML
0.4 INJECTION, SOLUTION INTRAMUSCULAR; INTRAVENOUS; SUBCUTANEOUS
Status: CANCELLED | OUTPATIENT
Start: 2024-11-24

## 2024-11-24 RX ORDER — ONDANSETRON 2 MG/ML
4 INJECTION INTRAMUSCULAR; INTRAVENOUS EVERY 6 HOURS PRN
Status: CANCELLED | OUTPATIENT
Start: 2024-11-24

## 2024-11-24 RX ORDER — PROCHLORPERAZINE MALEATE 5 MG/1
5 TABLET ORAL EVERY 6 HOURS PRN
Status: CANCELLED | OUTPATIENT
Start: 2024-11-24

## 2024-11-24 RX ORDER — FLUMAZENIL 0.1 MG/ML
0.2 INJECTION, SOLUTION INTRAVENOUS
Status: CANCELLED | OUTPATIENT
Start: 2024-11-24 | End: 2024-11-25

## 2024-11-24 RX ORDER — ONDANSETRON 4 MG/1
4 TABLET, ORALLY DISINTEGRATING ORAL EVERY 6 HOURS PRN
Status: CANCELLED | OUTPATIENT
Start: 2024-11-24

## 2024-11-25 ENCOUNTER — ANESTHESIA EVENT (OUTPATIENT)
Dept: SURGERY | Facility: AMBULATORY SURGERY CENTER | Age: 74
End: 2024-11-25
Payer: COMMERCIAL

## 2024-11-25 ENCOUNTER — HOSPITAL ENCOUNTER (OUTPATIENT)
Facility: AMBULATORY SURGERY CENTER | Age: 74
Discharge: HOME | End: 2024-11-25
Attending: INTERNAL MEDICINE
Payer: COMMERCIAL

## 2024-11-25 ENCOUNTER — ANESTHESIA (OUTPATIENT)
Dept: SURGERY | Facility: AMBULATORY SURGERY CENTER | Age: 74
End: 2024-11-25
Payer: COMMERCIAL

## 2024-11-25 ENCOUNTER — VIRTUAL VISIT (OUTPATIENT)
Dept: PSYCHOLOGY | Facility: CLINIC | Age: 74
End: 2024-11-25
Payer: COMMERCIAL

## 2024-11-25 VITALS
TEMPERATURE: 97 F | DIASTOLIC BLOOD PRESSURE: 78 MMHG | BODY MASS INDEX: 25.92 KG/M2 | OXYGEN SATURATION: 98 % | SYSTOLIC BLOOD PRESSURE: 123 MMHG | HEIGHT: 69 IN | WEIGHT: 175 LBS | RESPIRATION RATE: 16 BRPM | HEART RATE: 69 BPM

## 2024-11-25 VITALS — HEART RATE: 83 BPM

## 2024-11-25 DIAGNOSIS — F41.1 GAD (GENERALIZED ANXIETY DISORDER): Primary | ICD-10-CM

## 2024-11-25 LAB — COLONOSCOPY: NORMAL

## 2024-11-25 PROCEDURE — 88305 TISSUE EXAM BY PATHOLOGIST: CPT | Mod: 26 | Performed by: PATHOLOGY

## 2024-11-25 PROCEDURE — 90834 PSYTX W PT 45 MINUTES: CPT | Mod: 95

## 2024-11-25 PROCEDURE — 88305 TISSUE EXAM BY PATHOLOGIST: CPT | Mod: TC | Performed by: INTERNAL MEDICINE

## 2024-11-25 RX ORDER — LIDOCAINE HYDROCHLORIDE 20 MG/ML
INJECTION, SOLUTION INFILTRATION; PERINEURAL PRN
Status: DISCONTINUED | OUTPATIENT
Start: 2024-11-25 | End: 2024-11-25

## 2024-11-25 RX ORDER — PROPOFOL 10 MG/ML
INJECTION, EMULSION INTRAVENOUS PRN
Status: DISCONTINUED | OUTPATIENT
Start: 2024-11-25 | End: 2024-11-25

## 2024-11-25 RX ORDER — PROPOFOL 10 MG/ML
INJECTION, EMULSION INTRAVENOUS CONTINUOUS PRN
Status: DISCONTINUED | OUTPATIENT
Start: 2024-11-25 | End: 2024-11-25

## 2024-11-25 RX ORDER — LIDOCAINE 40 MG/G
CREAM TOPICAL
Status: DISCONTINUED | OUTPATIENT
Start: 2024-11-25 | End: 2024-11-26 | Stop reason: HOSPADM

## 2024-11-25 RX ORDER — SODIUM CHLORIDE, SODIUM LACTATE, POTASSIUM CHLORIDE, CALCIUM CHLORIDE 600; 310; 30; 20 MG/100ML; MG/100ML; MG/100ML; MG/100ML
INJECTION, SOLUTION INTRAVENOUS CONTINUOUS PRN
Status: DISCONTINUED | OUTPATIENT
Start: 2024-11-25 | End: 2024-11-25

## 2024-11-25 RX ORDER — ONDANSETRON 2 MG/ML
4 INJECTION INTRAMUSCULAR; INTRAVENOUS
Status: DISCONTINUED | OUTPATIENT
Start: 2024-11-25 | End: 2024-11-26 | Stop reason: HOSPADM

## 2024-11-25 RX ADMIN — PROPOFOL 70 MG: 10 INJECTION, EMULSION INTRAVENOUS at 14:04

## 2024-11-25 RX ADMIN — SODIUM CHLORIDE, SODIUM LACTATE, POTASSIUM CHLORIDE, CALCIUM CHLORIDE: 600; 310; 30; 20 INJECTION, SOLUTION INTRAVENOUS at 13:58

## 2024-11-25 RX ADMIN — PROPOFOL 150 MCG/KG/MIN: 10 INJECTION, EMULSION INTRAVENOUS at 14:04

## 2024-11-25 RX ADMIN — LIDOCAINE HYDROCHLORIDE 80 MG: 20 INJECTION, SOLUTION INFILTRATION; PERINEURAL at 14:04

## 2024-11-25 ASSESSMENT — LIFESTYLE VARIABLES: TOBACCO_USE: 1

## 2024-11-25 NOTE — ANESTHESIA POSTPROCEDURE EVALUATION
Patient: Ronald Pearson    Procedure: Procedure(s):  Colonoscopy with polypectomy       Anesthesia Type:  MAC    Note:  Disposition: Outpatient   Postop Pain Control: Uneventful            Sign Out: Well controlled pain   PONV: No   Neuro/Psych: Uneventful            Sign Out: Acceptable/Baseline neuro status   Airway/Respiratory: Uneventful            Sign Out: Acceptable/Baseline resp. status   CV/Hemodynamics: Uneventful            Sign Out: Acceptable CV status; No obvious hypovolemia; No obvious fluid overload   Other NRE: NONE   DID A NON-ROUTINE EVENT OCCUR? No           Last vitals:  Vitals Value Taken Time   /74 11/25/24 1452   Temp 36.2  C (97.1  F) 11/25/24 1438   Pulse 78 11/25/24 1452   Resp 16 11/25/24 1438   SpO2 96 % 11/25/24 1456   Vitals shown include unfiled device data.    Electronically Signed By: Patrick Duran MD, MD  November 25, 2024  2:57 PM

## 2024-11-25 NOTE — ANESTHESIA CARE TRANSFER NOTE
Patient: Ronald Pearson    Procedure: Procedure(s):  Colonoscopy with polypectomy       Diagnosis: Healthcare maintenance [Z00.00]  Diagnosis Additional Information: No value filed.    Anesthesia Type:   MAC     Note:    Oropharynx: oropharynx clear of all foreign objects and spontaneously breathing  Level of Consciousness: drowsy  Oxygen Supplementation: room air    Independent Airway: airway patency satisfactory and stable  Dentition: dentition unchanged  Vital Signs Stable: post-procedure vital signs reviewed and stable  Report to RN Given: handoff report given  Patient transferred to: Phase II    Handoff Report: Identifed the Patient, Identified the Reponsible Provider, Reviewed the pertinent medical history, Discussed the surgical course, Reviewed Intra-OP anesthesia mangement and issues during anesthesia, Set expectations for post-procedure period and Allowed opportunity for questions and acknowledgement of understanding      Vitals:  Vitals Value Taken Time   BP     Temp     Pulse 84    Resp 13    SpO2 95 % 11/25/24 1438   Vitals shown include unfiled device data.    Vital signs per nursing documentation.     Electronically Signed By: KRISTINE Saldana CRNA  November 25, 2024  2:39 PM
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
## 2024-11-26 LAB
PATH REPORT.COMMENTS IMP SPEC: NORMAL
PATH REPORT.COMMENTS IMP SPEC: NORMAL
PATH REPORT.FINAL DX SPEC: NORMAL
PATH REPORT.GROSS SPEC: NORMAL
PATH REPORT.MICROSCOPIC SPEC OTHER STN: NORMAL
PATH REPORT.RELEVANT HX SPEC: NORMAL
PHOTO IMAGE: NORMAL
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
## 2024-12-05 ENCOUNTER — TELEPHONE (OUTPATIENT)
Dept: OTOLARYNGOLOGY | Facility: CLINIC | Age: 74
End: 2024-12-05
Payer: COMMERCIAL

## 2024-12-09 PROBLEM — D12.6 ADENOMATOUS POLYP OF COLON: Status: ACTIVE | Noted: 2024-12-09

## 2024-12-10 NOTE — Clinical Note
-please stay well hydrated during your trip to [place] for your marathons  -continue aspirin 81 mg daily for stroke prevention  -check your blood pressure, goal 100-130/60-80  -discussed that you should eat a healthy diet (Mediterranean)  -avoid smoking and second hand smoke  -alcohol should be no more than 2 drinks/day for men and 1 drink/day for women  -recommend moderate intensity exercise 10 minutes 4x per week or vigorous exercise 20 minutes 2x per week   -if you develop worst headache of life or any new stroke symptoms (BEFAST symptoms), call 911 immediately   -Follow up with your primary care provider  -return to clinic in 12 months with Dr. Barragan    *I will communicate results to you through the [employer] [facility].  If you do not have the [employer] [facility], we will call you with results.    *You can also send me messages through the [employer] [facility], but it should NOT be used for any emergency situations, as it could take several days for me to review and return your messages.    Stroke, TIA and Warning Signs    Stroke occurs when a blood vessel bringing blood and oxygen to the brain gets blocked (ischemic stroke) or ruptures (hemorrhagic stroke). When this happens, brain cells don't get the blood that they need and the part of the body that the brain controls will not function appropriately.      Stroke symptoms (BEFAST):   BALANCE: Sudden loss of balance or coordination  EYES: Sudden change in vision; loss of vision; blurry vision; or double vision  FACIAL DROOP: Any facial droop on one side of the face  ARMS: Arm or leg weakness, numbness, or tingling  SPEECH: Slurred speech, trouble speaking, trouble understanding speech  TERRIBLE HEADACHE: Sudden onset of a terrible headache    If you SUDDENLY develop any of these symptoms, call 9-1-1 or seek emergency medical attention immediately!  Even if the symptoms improve, this may be a transient blockage of the blood vessel, or TIA (transient ischemic attack) which may be  Humberto Snider,  Please review, sign and provide feedback as needed  Thanks,  Rose   a warning sign that a stroke (permanent brain damage) may occur.      Isn't stroke hopeless?    No! We can treat strokes with tPA (clot busting medication) and thrombectomy (a catheter that can remove the clot), but this can only be done if you come to the hospital quickly and there is brain tissue left to preserve.      Mediterranean diet  Key components of the Mediterranean diet  The Mediterranean diet emphasizes:  Eating primarily plant-based foods, such as fruits and vegetables, whole grains, legumes and nuts   Replacing butter with healthy fats, such as olive oil   Using herbs and spices instead of salt to flavor foods   Limiting red meat to no more than a few times a month   Eating fish and poultry at least twice a week   Drinking red wine in moderation (optional)  The diet also recognizes the importance of being physically active, and enjoying meals with family and friends.    Focus on fruits, vegetables, nuts and grains  The Mediterranean diet traditionally includes fruits, vegetables and grains. For example, residents of [facility] average six or more servings a day of antioxidant-rich fruits and vegetables.  Grains in the Mediterranean region are typically whole grain and usually contain very few unhealthy trans fats, and bread is an important part of the diet. However, throughout the Mediterranean region, bread is eaten plain or dipped in olive oil -- not eaten with butter or margarine, which contains saturated or trans fats.   Nuts are another part of a healthy Mediterranean diet. Nuts are high in fat, but most of the fat is healthy. Because nuts are high in calories, they should not be eaten in large amounts -- generally no more than a handful a day. For the best nutrition, avoid candied or honey-roasted and heavily salted nuts.    Choose healthier fats  The focus of the Mediterranean diet isn't on limiting total fat consumption, but rather on choosing healthier types of fat. The Mediterranean diet  discourages saturated fats and hydrogenated oils (trans fats), both of which contribute to heart disease.  The Mediterranean diet features olive oil as the primary source of fat. Olive oil is mainly monounsaturated fat -- a type of fat that can help reduce low-density lipoprotein (LDL) cholesterol levels when used in place of saturated or trans fats. \"Extra-virgin\" and \"virgin\" olive oils (the least processed forms) also contain the highest levels of protective plant compounds that provide antioxidant effects.  Canola oil and some nuts contain the beneficial linolenic acid (a type of omega-3 fatty acid) in addition to healthy unsaturated fat. Omega-3 fatty acids lower triglycerides, decrease blood clotting, and are associated with decreased incidence of sudden heart attacks, improve the health of your blood vessels, and help moderate blood pressure. Fatty fish -- such as mackerel, lake trout, herring, sardines, albacore tuna and salmon -- are rich sources of omega-3 fatty acids. Fish is eaten on a regular basis in the Mediterranean diet.    Putting it all togetherThe Mediterranean diet is a delicious and healthy way to eat. Many people who switch to this style of eating say they'll never eat any other way. Here are some specific steps to get you started:   Eat your veggies and fruits -- and switch to whole grains. [place] of plant foods should make up the majority of your meals. They should be minimally processed -- fresh and whole are best. Include veggies and fruits in every meal and eat them for snacks as well. Switch to whole-grain bread and cereal, and begin to eat more whole-grain rice and pasta products. Keep baby carrots, apples and bananas on hand for quick, satisfying snacks. Fruit salads are a wonderful way to eat a variety of healthy fruit.   Go nuts. Nuts and seeds are good sources of fiber, protein and healthy fats. Keep almonds, cashews, pistachios and walnuts on hand for a quick snack. Choose  natural peanut butter, rather than the kind with hydrogenated fat added. Try blended sesame seeds (tahini) as a dip or spread for bread.   Pass on the butter. Try olive or canola oil as a healthy replacement for butter or margarine. Lightly drizzle it over vegetables. After cooking pasta, add a touch of olive oil, some garlic and green onions for flavoring. Dip bread in flavored olive oil or lightly spread it on whole-grain bread for a tasty alternative to butter. Try tahini as a dip or spread for bread too.   Spice it up. Herbs and spices make food tasty and can  for salt and fat in recipes.   Go fish. Eat fish at least twice a week. Fresh or water-packed tuna, salmon, trout, mackerel and herring are healthy choices. [place], bake or broil fish for great taste and easy cleanup. Avoid breaded and fried fish.   Rein in the red meat. Limit red meat to no more than a few times a month. Substitute fish and poultry for red meat. When choosing red meat, make sure it's lean and keep portions small (about the size of a deck of cards). Also avoid sausage, chen and other high-fat, processed meats.   Choose low-fat dairy. Limit higher fat dairy products, such as whole or 2 percent milk, cheese and ice cream. Switch to skim milk, fat-free yogurt and low-fat cheese.

## 2025-01-06 ENCOUNTER — OFFICE VISIT (OUTPATIENT)
Dept: PSYCHOLOGY | Facility: CLINIC | Age: 75
End: 2025-01-06
Payer: COMMERCIAL

## 2025-01-06 DIAGNOSIS — F41.1 GAD (GENERALIZED ANXIETY DISORDER): Primary | ICD-10-CM

## 2025-01-06 PROCEDURE — 90837 PSYTX W PT 60 MINUTES: CPT
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
## 2025-02-04 ENCOUNTER — OFFICE VISIT (OUTPATIENT)
Dept: URGENT CARE | Facility: URGENT CARE | Age: 75
End: 2025-02-04
Payer: COMMERCIAL

## 2025-02-04 VITALS
HEIGHT: 69 IN | HEART RATE: 78 BPM | TEMPERATURE: 99.1 F | WEIGHT: 180 LBS | RESPIRATION RATE: 16 BRPM | BODY MASS INDEX: 26.66 KG/M2 | DIASTOLIC BLOOD PRESSURE: 88 MMHG | OXYGEN SATURATION: 98 % | SYSTOLIC BLOOD PRESSURE: 155 MMHG

## 2025-02-04 DIAGNOSIS — J01.10 ACUTE NON-RECURRENT FRONTAL SINUSITIS: ICD-10-CM

## 2025-02-04 DIAGNOSIS — R51.9 ACUTE NONINTRACTABLE HEADACHE, UNSPECIFIED HEADACHE TYPE: Primary | ICD-10-CM

## 2025-02-04 LAB
FLUAV AG SPEC QL IA: NEGATIVE
FLUBV AG SPEC QL IA: NEGATIVE

## 2025-02-04 PROCEDURE — 87804 INFLUENZA ASSAY W/OPTIC: CPT | Performed by: PHYSICIAN ASSISTANT

## 2025-02-04 RX ORDER — FLUTICASONE PROPIONATE 50 MCG
1 SPRAY, SUSPENSION (ML) NASAL DAILY
Qty: 15.8 ML | Refills: 0 | Status: SHIPPED | OUTPATIENT
Start: 2025-02-04

## 2025-02-04 RX ORDER — IBUPROFEN 200 MG
200 TABLET ORAL EVERY 4 HOURS PRN
COMMUNITY
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
## 2025-02-06 ENCOUNTER — MYC MEDICAL ADVICE (OUTPATIENT)
Dept: FAMILY MEDICINE | Facility: CLINIC | Age: 75
End: 2025-02-06

## 2025-02-06 ENCOUNTER — OFFICE VISIT (OUTPATIENT)
Dept: FAMILY MEDICINE | Facility: CLINIC | Age: 75
End: 2025-02-06
Payer: COMMERCIAL

## 2025-02-06 VITALS
SYSTOLIC BLOOD PRESSURE: 126 MMHG | HEIGHT: 69 IN | TEMPERATURE: 97.7 F | OXYGEN SATURATION: 98 % | HEART RATE: 108 BPM | BODY MASS INDEX: 25.71 KG/M2 | RESPIRATION RATE: 15 BRPM | DIASTOLIC BLOOD PRESSURE: 72 MMHG | WEIGHT: 173.6 LBS

## 2025-02-06 DIAGNOSIS — Z00.00 ENCOUNTER FOR MEDICARE ANNUAL WELLNESS EXAM: Primary | ICD-10-CM

## 2025-02-06 DIAGNOSIS — E78.00 ELEVATED CHOLESTEROL: ICD-10-CM

## 2025-02-06 DIAGNOSIS — Z23 NEED FOR TDAP VACCINATION: ICD-10-CM

## 2025-02-06 DIAGNOSIS — Z12.83 ENCOUNTER FOR SCREENING FOR MALIGNANT NEOPLASM OF SKIN: ICD-10-CM

## 2025-02-06 DIAGNOSIS — Z23 NEED FOR SHINGLES VACCINE: ICD-10-CM

## 2025-02-06 DIAGNOSIS — R73.03 PREDIABETES: ICD-10-CM

## 2025-02-06 DIAGNOSIS — I77.71 CAROTID ARTERY DISSECTION: ICD-10-CM

## 2025-02-06 LAB
ANION GAP SERPL CALCULATED.3IONS-SCNC: 11 MMOL/L (ref 7–15)
BUN SERPL-MCNC: 13.8 MG/DL (ref 8–23)
CALCIUM SERPL-MCNC: 9.5 MG/DL (ref 8.8–10.4)
CHLORIDE SERPL-SCNC: 100 MMOL/L (ref 98–107)
CHOLEST SERPL-MCNC: 192 MG/DL
CREAT SERPL-MCNC: 0.95 MG/DL (ref 0.67–1.17)
EGFRCR SERPLBLD CKD-EPI 2021: 84 ML/MIN/1.73M2
EST. AVERAGE GLUCOSE BLD GHB EST-MCNC: 114 MG/DL
FASTING STATUS PATIENT QL REPORTED: ABNORMAL
FASTING STATUS PATIENT QL REPORTED: ABNORMAL
GLUCOSE SERPL-MCNC: 165 MG/DL (ref 70–99)
HBA1C MFR BLD: 5.6 % (ref 0–5.6)
HCO3 SERPL-SCNC: 26 MMOL/L (ref 22–29)
HDLC SERPL-MCNC: 43 MG/DL
LDLC SERPL CALC-MCNC: 133 MG/DL
NONHDLC SERPL-MCNC: 149 MG/DL
POTASSIUM SERPL-SCNC: 4 MMOL/L (ref 3.4–5.3)
SODIUM SERPL-SCNC: 137 MMOL/L (ref 135–145)
TRIGL SERPL-MCNC: 81 MG/DL

## 2025-02-06 SDOH — HEALTH STABILITY: PHYSICAL HEALTH: ON AVERAGE, HOW MANY DAYS PER WEEK DO YOU ENGAGE IN MODERATE TO STRENUOUS EXERCISE (LIKE A BRISK WALK)?: 2 DAYS

## 2025-02-06 ASSESSMENT — SOCIAL DETERMINANTS OF HEALTH (SDOH): HOW OFTEN DO YOU GET TOGETHER WITH FRIENDS OR RELATIVES?: TWICE A WEEK

## 2025-02-06 ASSESSMENT — PAIN SCALES - GENERAL: PAINLEVEL_OUTOF10: NO PAIN (0)

## 2025-02-06 NOTE — PROGRESS NOTES
"Preventive Care Visit  [facility]  Megan Landry DNP, Nurse Practitioner Primary Care  Feb 6, 2025      Assessment & Plan     Encounter for Medicare annual wellness exam  Reviewed medical/social/family history and health maintenance   Discussed recommended vaccines through pharmacy.  Holding off on Flu and COVID today due to recent illness    Carotid artery dissection  Managed medically, no stenting or repair.  Will obtain surveillance dulpex to monitor for stenosis.    - US Carotid Bilateral; Future    Prediabetes  Continue to monitor  - BASIC METABOLIC PANEL; Future  - Hemoglobin A1c; Future  - BASIC METABOLIC PANEL  - Hemoglobin A1c    Need for shingles vaccine  Need for Tdap vaccination  Available through the pharmacy    Elevated cholesterol  Will consider statin, especially with history of carotid artery dissection.  - Lipid panel reflex to direct LDL Non-fasting; Future  - Lipid panel reflex to direct LDL Non-fasting    Encounter for screening for malignant neoplasm of skin  - Adult Dermatology  Referral; Future    Patient has been advised of split billing requirements and indicates understanding: Yes    The longitudinal plan of care for the diagnosis(es)/condition(s) as documented were addressed during this visit. Due to the added complexity in care, I will continue to support Harlan in the subsequent management and with ongoing continuity of care.    BMI  Estimated body mass index is 26.01 kg/m  as calculated from the following:    Height as of this encounter: 1.74 m (5' 8.5\").    Weight as of this encounter: 78.7 kg (173 lb 9.6 oz).       Counseling  Appropriate preventive services were addressed with this patient via screening, questionnaire, or discussion as appropriate for fall prevention, nutrition, physical activity, Tobacco-use cessation, social engagement, weight loss and cognition.  Checklist reviewing preventive services available has been given to the " patient.  Reviewed patient's diet, addressing concerns and/or questions.   He is at risk for lack of exercise and has been provided with information to increase physical activity for the benefit of his well-being.   The patient was instructed to see the dentist every 6 months.   Discussed possible causes of fatigue. The patient was provided with written information regarding signs of hearing loss.           Subjective   Harlan is a 74 year old, presenting for the following:  Wellness Visit (No concerns- is recovering from a bug that he was seen in  for. Has some weird pain inside mouth on R side- feels like a burning pain. Has appt for dentist. Feel like its at the base of tongue or back of jaw almost- comes and goes )        2/6/2025    10:28 AM   Additional Questions   Roomed by Katlin NELSON   Accompanied by SELF         2/6/2025    10:28 AM   Patient Reported Additional Medications   Patient reports taking the following new medications no           History of Present Illness       Reason for visit:  Establish primary care   He is taking medications regularly.    Carotid artery dissection, had a CT scan, was told it half recovered. No stenting was done.         Health Care Directive  Patient does not have a Health Care Directive: Patient states has Advance Directive and will bring in a copy to clinic.      2/6/2025   General Health   How would you rate your overall physical health? Good   Feel stress (tense, anxious, or unable to sleep) Only a little   (!) STRESS CONCERN      2/6/2025   Nutrition   Diet: Regular (no restrictions)         2/6/2025   Exercise   Days per week of moderate/strenous exercise 2 days   (!) EXERCISE CONCERN      2/6/2025   Social Factors   Frequency of gathering with friends or relatives Twice a week   Worry food won't last until get money to buy more No   Food not last or not have enough money for food? No   Do you have housing? (Housing is defined as stable permanent housing and does not  include staying ouside in a car, in a tent, in an abandoned building, in an overnight shelter, or couch-surfing.) Yes   Are you worried about losing your housing? No   Lack of transportation? No   Unable to get utilities (heat,electricity)? No         2025   Fall Risk   Fallen 2 or more times in the past year? No    No   Trouble with walking or balance? No    No       Multiple values from one day are sorted in reverse-chronological order          2025   Activities of Daily Living- Home Safety   Needs help with the following daily activites None of the above   Safety concerns in the home None of the above         2025   Dental   Dentist two times every year? (!) NO         2025   Hearing Screening   Hearing concerns? (!) IT'S HARD TO FOLLOW A CONVERSATION IN A NOISY RESTAURANT OR CROWDED ROOM.         2025   Driving Risk Screening   Patient/family members have concerns about driving No         2025   General Alertness/Fatigue Screening   Have you been more tired than usual lately? (!) YES         2025   Urinary Incontinence Screening   Bothered by leaking urine in past 6 months No            Today's PHQ-2 Score:       2025    10:16 AM   PHQ-2 (  [employer])   Q1: Little interest or pleasure in doing things 0   Q2: Feeling down, depressed or hopeless 0   PHQ-2 Score 0    Q1: Little interest or pleasure in doing things Not at all   Q2: Feeling down, depressed or hopeless Not at all   PHQ-2 Score 0       Patient-reported           2025   Substance Use   Alcohol more than 3/day or more than 7/wk No   Do you have a current opioid prescription? No   How severe/bad is pain from 1 to 10? 3/10   Do you use any other substances recreationally? (!) CANNABIS PRODUCTS     Social History     Tobacco Use    Smoking status: Former     Current packs/day: 0.00     Types: Cigarettes     Quit date: 1983     Years since quittin.0    Smokeless tobacco: Never    Tobacco comments:     does not  vape    Substance Use Topics    Alcohol use: Yes     Comment: beer 2 daily    Drug use: No       ASCVD Risk   The ASCVD Risk score (João DK, et al., 2019) failed to calculate for the following reasons:    Risk score cannot be calculated because patient has a medical history suggesting prior/existing ASCVD            Reviewed and updated as needed this visit by Provider                      Current providers sharing in care for this patient include:  Patient Care Team:  Ree Whitley NP as PCP - General (Family Medicine)  Kemi Mares MD as MD (Ophthalmology)  Esperanza Lieberman MD as Referring Physician  Darlene Sung CNP as Nurse Practitioner (Urology)  Emily Tejada RN as Specialty Care Coordinator (Urology)  Darlene Sung CNP as Nurse Practitioner (Urology)  Ree Whitley NP as Assigned PCP  Artemio Melendrez MD as MD (Otolaryngology)  Eliana Lynch LICSW as Assigned Behavioral Health Provider  Darlene Sung CNP as Nurse Practitioner (Urology)  Darlene Sung CNP as Assigned Surgical Provider    The following health maintenance items are reviewed in Epic and correct as of today:  Health Maintenance   Topic Date Due    ANNUAL REVIEW OF HM ORDERS  Never done    Pneumococcal Vaccine: 50+ Years (1 of 1 - PCV) Never done    ZOSTER IMMUNIZATION (1 of 2) Never done    MEDICARE ANNUAL WELLNESS VISIT  07/08/2022    DTAP/TDAP/TD IMMUNIZATION (3 - Td or Tdap) 04/15/2024    INFLUENZA VACCINE (1) 09/01/2024    COVID-19 Vaccine (6 - 2024-25 season) 09/01/2024    BMP  01/25/2025    LIPID  01/25/2025    RSV VACCINE (1 - 1-dose 75+ series) 05/17/2025    FALL RISK ASSESSMENT  02/06/2026    GLUCOSE  01/25/2027    COLORECTAL CANCER SCREENING  11/25/2027    ADVANCE CARE PLANNING  10/10/2028    HEPATITIS C SCREENING  Completed    PHQ-2 (once per calendar year)  Completed    AORTIC ANEURYSM SCREENING (SYSTEM ASSIGNED)  Completed    HPV IMMUNIZATION  Aged Out     "MENINGITIS IMMUNIZATION  Aged Out            Objective    Exam  /72 (BP Location: Right arm, Patient Position: Sitting, Cuff Size: Adult Regular)   Pulse 108   Temp 97.7  F (36.5  C) (Temporal)   Resp 15   Ht 1.74 m (5' 8.5\")   Wt 78.7 kg (173 lb 9.6 oz)   SpO2 98%   BMI 26.01 kg/m     Estimated body mass index is 26.01 kg/m  as calculated from the following:    Height as of this encounter: 1.74 m (5' 8.5\").    Weight as of this encounter: 78.7 kg (173 lb 9.6 oz).    Physical Exam  GENERAL: alert and no distress  EYES: Eyes grossly normal to inspection, PERRL and conjunctivae and sclerae normal  HENT: ear canals and TM's normal, nose and mouth without ulcers or lesions  NECK: no adenopathy, no asymmetry, masses, or scars  RESP: lungs clear to auscultation - no rales, rhonchi or wheezes  CV: regular rate and rhythm, normal S1 S2, no S3 or S4, no murmur, click or rub, no peripheral edema  ABDOMEN: soft, nontender, no hepatosplenomegaly, no masses and bowel sounds normal  MS: no gross musculoskeletal defects noted, no edema        2/6/2025   Mini Cog   Clock Draw Score 2 Normal   3 Item Recall 3 objects recalled   Mini Cog Total Score 5              Signed Electronically by: Megan Landry DNP    "

## 2025-02-06 NOTE — PATIENT INSTRUCTIONS
Patient Education   Preventive Care Advice   This is general advice given by our system to help you stay healthy. However, your care team may have specific advice just for you. Please talk to your care team about your preventive care needs.  Nutrition  Eat 5 or more servings of fruits and vegetables each day.  Try wheat bread, brown rice and whole grain pasta (instead of white bread, rice, and pasta).  Get enough calcium and vitamin D. Check the label on foods and aim for 100% of the RDA (recommended daily allowance).  Lifestyle  Exercise at least 150 minutes each week  (30 minutes a day, 5 days a week).  Do muscle strengthening activities 2 days a week. These help control your weight and prevent disease.  No smoking.  Wear sunscreen to prevent skin cancer.  Have a dental exam and cleaning every 6 months.  Yearly exams  See your health care team every year to talk about:  Any changes in your health.  Any medicines your care team has prescribed.  Preventive care, family planning, and ways to prevent chronic diseases.  Shots (vaccines)   HPV shots (up to age 26), if you've never had them before.  Hepatitis B shots (up to age 59), if you've never had them before.  COVID-19 shot: Get this shot when it's due.  Flu shot: Get a flu shot every year.  Tetanus shot: Get a tetanus shot every 10 years.  Pneumococcal, hepatitis A, and RSV shots: Ask your care team if you need these based on your risk.  Shingles shot (for age 50 and up)  General health tests  Diabetes screening:  Starting at age 35, Get screened for diabetes at least every 3 years.  If you are younger than age 35, ask your care team if you should be screened for diabetes.  Cholesterol test: At age 39, start having a cholesterol test every 5 years, or more often if advised.  Bone density scan (DEXA): At age 50, ask your care team if you should have this scan for osteoporosis (brittle bones).  Hepatitis C: Get tested at least once in your life.  STIs (sexually  transmitted infections)  Before age 24: Ask your care team if you should be screened for STIs.  After age 24: Get screened for STIs if you're at risk. You are at risk for STIs (including HIV) if:  You are sexually active with more than one person.  You don't use condoms every time.  You or a partner was diagnosed with a sexually transmitted infection.  If you are at risk for HIV, ask about PrEP medicine to prevent HIV.  Get tested for HIV at least once in your life, whether you are at risk for HIV or not.  Cancer screening tests  Cervical cancer screening: If you have a cervix, begin getting regular cervical cancer screening tests starting at age 21.  Breast cancer scan (mammogram): If you've ever had breasts, begin having regular mammograms starting at age 40. This is a scan to check for breast cancer.  Colon cancer screening: It is important to start screening for colon cancer at age 45.  Have a colonoscopy test every 10 years (or more often if you're at risk) Or, ask your provider about stool tests like a FIT test every year or Cologuard test every 3 years.  To learn more about your testing options, visit:   .  For help making a decision, visit:   https://bit.ly/zh39154.  Prostate cancer screening test: If you have a prostate, ask your care team if a prostate cancer screening test (PSA) at age 55 is right for you.  Lung cancer screening: If you are a current or former smoker ages 50 to 80, ask your care team if ongoing lung cancer screenings are right for you.  For informational purposes only. Not to replace the advice of your health care provider. Copyright   2023 [facility]. All rights reserved. Clinically reviewed by the [facility]. [employer] [id number] - REV 01/24.  Hearing Loss: Care Instructions  Overview     Hearing loss is a sudden or slow decrease in how well you hear. It can range from slight to profound. Permanent hearing loss can occur with aging. It also can  happen when you are exposed long-term to loud noise. Examples include listening to loud music, riding motorcycles, or being around other loud machines.  Hearing loss can affect your work and home life. It can make you feel lonely or depressed. You may feel that you have lost your independence. But hearing aids and other devices can help you hear better and feel connected to others.  Follow-up care is a key part of your treatment and safety. Be sure to make and go to all appointments, and call your doctor if you are having problems. It's also a good idea to know your test results and keep a list of the medicines you take.  How can you care for yourself at home?  Avoid loud noises whenever possible. This helps keep your hearing from getting worse.  Always wear hearing protection around loud noises.  Wear a hearing aid as directed.  A professional can help you pick a hearing aid that will work best for you.  You can also get hearing aids over the counter for mild to moderate hearing loss.  Have hearing tests as your doctor suggests. They can show whether your hearing has changed. Your hearing aid may need to be adjusted.  Use other devices as needed. These may include:  Telephone amplifiers and hearing aids that can connect to a television, stereo, radio, or microphone.  Devices that use lights or vibrations. These alert you to the doorbell, a ringing telephone, or a baby monitor.  Television closed-captioning. This shows the words at the bottom of the screen. Most new TVs can do this.  TTY (text telephone). This lets you type messages back and forth on the telephone instead of talking or listening. These devices are also called TDD. When messages are typed on the keyboard, they are sent over the phone line to a receiving TTY. The message is shown on a monitor.  Use text messaging, social media, and email if it is hard for you to communicate by telephone.  Try to learn a listening technique called speechreading. It is  "not lipreading. You pay attention to people's gestures, expressions, posture, and tone of voice. These clues can help you understand what a person is saying. Face the person you are talking to, and have them face you. Make sure the lighting is good. You need to see the other person's face clearly.  Think about counseling if you need help to adjust to your hearing loss.  When should you call for help?  Watch closely for changes in your health, and be sure to contact your doctor if:    You think your hearing is getting worse.     You have new symptoms, such as dizziness or nausea.   Where can you learn more?  Go to https://www.[employer].net/patiented  Enter R798 in the search box to learn more about \"Hearing Loss: Care Instructions.\"  Current as of: September 27, 2023  Content Version: 14.3    2024 [employer].   Care instructions adapted under license by your healthcare professional. If you have questions about a medical condition or this instruction, always ask your healthcare professional. [employer] disclaims any warranty or liability for your use of this information.    Learning About Sleeping Well  What does sleeping well mean?     Sleeping well means getting enough sleep to feel good and stay healthy. How much sleep is enough varies among people.  The number of hours you sleep and how you feel when you wake up are both important. If you do not feel refreshed, you probably need more sleep. Another sign of not getting enough sleep is feeling tired during the day.  Experts recommend that adults get at least 7 or more hours of sleep per day. Children and older adults need more sleep.  Why is getting enough sleep important?  Getting enough quality sleep is a basic part of good health. When your sleep suffers, your physical health, mood, and your thoughts can suffer too. You may find yourself feeling more grumpy or stressed. Not getting enough sleep also can lead to serious problems, including " "injury, accidents, anxiety, and depression.  What might cause poor sleeping?  Many things can cause sleep problems, including:  Changes to your sleep schedule.  Stress. Stress can be caused by fear about a single event, such as giving a speech. Or you may have ongoing stress, such as worry about work or school.  Depression, anxiety, and other mental or emotional conditions.  Changes in your sleep habits or surroundings. This includes changes that happen where you sleep, such as noise, light, or sleeping in a different bed. It also includes changes in your sleep pattern, such as having jet lag or working a late shift.  Health problems, such as pain, breathing problems, and restless legs syndrome.  Lack of regular exercise.  Using alcohol, nicotine, or caffeine before bed.  How can you help yourself?  Here are some tips that may help you sleep more soundly and wake up feeling more refreshed.  Your sleeping area   Use your bedroom only for sleeping and sex. A bit of light reading may help you fall asleep. But if it doesn't, do your reading elsewhere in the house. Try not to use your TV, computer, smartphone, or tablet while you are in bed.  Be sure your bed is big enough to stretch out comfortably, especially if you have a sleep partner.  Keep your bedroom quiet, dark, and cool. Use curtains, blinds, or a sleep mask to block out light. To block out noise, use earplugs, soothing music, or a \"white noise\" machine.  Your evening and bedtime routine   Create a relaxing bedtime routine. You might want to take a warm shower or bath, or listen to soothing music.  Go to bed at the same time every night. And get up at the same time every morning, even if you feel tired.  What to avoid   Limit caffeine (coffee, tea, caffeinated sodas) during the day, and don't have any for at least 6 hours before bedtime.  Avoid drinking alcohol before bedtime. Alcohol can cause you to wake up more often during the night.  Try not to smoke or " "use tobacco, especially in the evening. Nicotine can keep you awake.  Limit naps during the day, especially close to bedtime.  Avoid lying in bed awake for too long. If you can't fall asleep or if you wake up in the middle of the night and can't get back to sleep within about 20 minutes, get out of bed and go to another room until you feel sleepy.  Avoid taking medicine right before bed that may keep you awake or make you feel hyper or energized. Your doctor can tell you if your medicine may do this and if you can take it earlier in the day.  If you can't sleep   Imagine yourself in a peaceful, pleasant scene. Focus on the details and feelings of being in a place that is relaxing.  Get up and do a quiet or boring activity until you feel sleepy.  Avoid drinking any liquids before going to bed to help prevent waking up often to use the bathroom.  Where can you learn more?  Go to https://www.[employer].net/patiented  Enter J942 in the search box to learn more about \"Learning About Sleeping Well.\"  Current as of: July 31, 2024  Content Version: 14.3    2024 [employer].   Care instructions adapted under license by your healthcare professional. If you have questions about a medical condition or this instruction, always ask your healthcare professional. [employer] disclaims any warranty or liability for your use of this information.    Substance Use Disorder: Care Instructions  Overview     You can improve your life and health by stopping your use of alcohol or drugs. When you don't drink or use drugs, you may feel and sleep better. You may get along better with your family, friends, and coworkers. There are medicines and programs that can help with substance use disorder.  How can you care for yourself at home?  Here are some ways to help you stay sober and prevent relapse.  If you have been given medicine to help keep you sober or reduce your cravings, be sure to take it exactly as " prescribed.  Talk to your doctor about programs that can help you stop using drugs or drinking alcohol.  Do not keep alcohol or drugs in your home.  Plan ahead. Think about what you'll say if other people ask you to drink or use drugs. Try not to spend time with people who drink or use drugs.  Use the time and money spent on drinking or drugs to do something that's important to you.  Preventing a relapse  Have a plan to deal with relapse. Learn to recognize changes in your thinking that lead you to drink or use drugs. Get help before you start to drink or use drugs again.  Try to stay away from situations, friends, or places that may lead you to drink or use drugs.  If you feel the need to drink alcohol or use drugs again, seek help right away. Call a trusted friend or family member. Some people get support from organizations such as Narcotics Anonymous or [employer] or from treatment facilities.  If you relapse, get help as soon as you can. Some people make a plan with another person that outlines what they want that person to do for them if they relapse. The plan usually includes how to handle the relapse and who to notify in case of relapse.  Don't give up. Remember that a relapse doesn't mean that you have failed. Use the experience to learn the triggers that lead you to drink or use drugs. Then quit again. Recovery is a lifelong process. Many people have several relapses before they are able to quit for good.  Follow-up care is a key part of your treatment and safety. Be sure to make and go to all appointments, and call your doctor if you are having problems. It's also a good idea to know your test results and keep a list of the medicines you take.  When should you call for help?   Call 911  anytime you think you may need emergency care. For example, call if you or someone else:    Has overdosed or has withdrawal signs. Be sure to tell the emergency workers that you are or someone else is using or trying to  "quit using drugs. Overdose or withdrawal signs may include:  Losing consciousness.  Seizure.  Seeing or hearing things that aren't there (hallucinations).     Is thinking or talking about suicide or harming others.   Where to get help 24 hours a day, 7 days a week   If you or someone you know talks about suicide, self-harm, a mental health crisis, a substance use crisis, or any other kind of emotional distress, get help right away. You can:    Call the Suicide and Crisis Lifeline at 534.     Call 1-811-667-[employer] (1-836.845.9186).     Text HOME to 083519 to access the Crisis Text Line.   Consider saving these numbers in your phone.  Go to [employer] for more information or to chat online.  Call your doctor now or seek immediate medical care if:    You are having withdrawal symptoms. These may include nausea or vomiting, sweating, shakiness, and anxiety.   Watch closely for changes in your health, and be sure to contact your doctor if:    You have a relapse.     You need more help or support to stop.   Where can you learn more?  Go to https://www.[employer].net/patiented  Enter H573 in the search box to learn more about \"Substance Use Disorder: Care Instructions.\"  Current as of: November 15, 2023  Content Version: 14.3    2024 [employer].   Care instructions adapted under license by your healthcare professional. If you have questions about a medical condition or this instruction, always ask your healthcare professional. [employer] disclaims any warranty or liability for your use of this information.       "

## 2025-02-10 NOTE — TELEPHONE ENCOUNTER
Writer responded via [employer].   Megan: MANOLO - advised to call with another occurrence. Please see message.    EDIL GauthierN, PHN, RN-[facility]  [phone]

## 2025-02-13 ENCOUNTER — ANCILLARY PROCEDURE (OUTPATIENT)
Dept: ULTRASOUND IMAGING | Facility: CLINIC | Age: 75
End: 2025-02-13
Attending: NURSE PRACTITIONER
Payer: COMMERCIAL

## 2025-02-13 DIAGNOSIS — I77.71 CAROTID ARTERY DISSECTION: ICD-10-CM

## 2025-02-13 LAB — RADIOLOGIST FLAGS: ABNORMAL

## 2025-02-13 PROCEDURE — 93880 EXTRACRANIAL BILAT STUDY: CPT | Performed by: RADIOLOGY

## 2025-02-13 NOTE — TELEPHONE ENCOUNTER
Right jaw pain and headaches since 2/1  Scheduled for US today, but wants to schedule something with PCP to discuss results and review plan of care.  Patient scheduled for virtual visit on 2/17 with PCP and advised to call back or be seen with any new or worsening sx. The patient indicates understanding of these issues and agrees with the plan.    EDIL PiperN, PHN, AMB-BC (she/her)  [facility] RN

## 2025-02-14 ENCOUNTER — TELEPHONE (OUTPATIENT)
Dept: OTHER | Facility: CLINIC | Age: 75
End: 2025-02-14

## 2025-02-18 ENCOUNTER — VIRTUAL VISIT (OUTPATIENT)
Facility: CLINIC | Age: 75
End: 2025-02-18
Payer: COMMERCIAL

## 2025-02-18 DIAGNOSIS — F41.1 GAD (GENERALIZED ANXIETY DISORDER): Primary | ICD-10-CM

## 2025-02-18 PROCEDURE — 90837 PSYTX W PT 60 MINUTES: CPT | Mod: 95
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
## 2025-02-24 ENCOUNTER — ANCILLARY PROCEDURE (OUTPATIENT)
Dept: CT IMAGING | Facility: CLINIC | Age: 75
End: 2025-02-24
Attending: NURSE PRACTITIONER
Payer: COMMERCIAL

## 2025-02-24 ENCOUNTER — MYC MEDICAL ADVICE (OUTPATIENT)
Dept: FAMILY MEDICINE | Facility: CLINIC | Age: 75
End: 2025-02-24

## 2025-02-24 DIAGNOSIS — I77.71 CAROTID ARTERY DISSECTION: ICD-10-CM

## 2025-02-24 PROCEDURE — 70498 CT ANGIOGRAPHY NECK: CPT | Performed by: INTERNAL MEDICINE

## 2025-02-24 PROCEDURE — 70496 CT ANGIOGRAPHY HEAD: CPT | Performed by: INTERNAL MEDICINE

## 2025-02-24 RX ORDER — IOPAMIDOL 755 MG/ML
70 INJECTION, SOLUTION INTRAVASCULAR ONCE
Status: COMPLETED | OUTPATIENT
Start: 2025-02-24 | End: 2025-02-24

## 2025-02-24 RX ORDER — IOPAMIDOL 755 MG/ML
90 INJECTION, SOLUTION INTRAVASCULAR ONCE
Status: DISCONTINUED | OUTPATIENT
Start: 2025-02-24 | End: 2025-02-24 | Stop reason: DRUGHIGH

## 2025-02-24 RX ADMIN — IOPAMIDOL 70 ML: 755 INJECTION, SOLUTION INTRAVASCULAR at 16:43

## 2025-02-24 NOTE — DISCHARGE INSTRUCTIONS

## 2025-02-26 NOTE — TELEPHONE ENCOUNTER
Bran Elizabeth MD  [facility] Rn Triage; Megan Landry, DNP  Nothing for vascular surgery to offer. Suggest evaluation by Neuro IR  Thank you,  NIRAJK          Previous Messages       ----- Message -----  From: Socorro Cooper RN  Sent: 2/25/2025   2:40 PM CST  To: Bran Elizabeth MD  Subject: Referral                                        Please review 02/24/25 CTA and advise if patient should be seen at [facility].  
RN reviewed below staff message. Referral was transferred to the [facility] IR for IR Neuro consult.    Kat Aguilar RN  [facility]    
Referral received via [employer] on 2/14/25.    Referred by Megan Landry DNP for carotid dissection.    Message received from Dr. Elizabeth that he will review patients imaging and let nursing know if we will see this patient or if he will be referred to Neuro IR.    Monet HART, RN    [facility]  [facility]ascular [facility]  Office: [phone]  Fax: [phone]      
Staff message sent to Dr. Elizabeth to review CTA completed 02/24/25.   
cold symptoms

## 2025-02-26 NOTE — TELEPHONE ENCOUNTER
Will schedule visit if needed but will pend to nurses if able to assist with Lab test questions/concerns

## 2025-02-27 NOTE — TELEPHONE ENCOUNTER
SPINE PATIENTS - NEW PROTOCOL PREVISIT     RECORDS RECEVEIVED FROM: Referred by Mgean Landry DNP in [facility]   REASON FOR VISIT: Carotid artery dissection   ICAO (internal carotid artery occlusion), right, Cerebrovascular   PROVIDER: Chauncey   DATE OF APPT: 03/05/2025     NOTES (FOR ALL VISITS) STATUS DETAILS   OFFICE NOTE from referring provider  Referral and notes in chart   OFFICE NOTE from other specialist     DISCHARGE SUMMARY from hospital     DISCHARGE REPORT from ER     OPERATIVE REPORT     EMG REPORT     Injection     Physical therapy internal PT last completed 04/10/2024     IMAGING  (FOR ALL VISITS) STATUS DETAILS   MRI (HEAD, NECK, SPINE)     XRAY (SPINE) *NEUROSURGERY*     CT (HEAD, NECK, SPINE) internal CTA head 02/24/2025

## 2025-02-27 NOTE — TELEPHONE ENCOUNTER
Nothing to pend; please schedule pt to review results.  IRENE Ramirez BSN, PHN, RN-[facility]  [phone]

## 2025-03-03 ENCOUNTER — VIRTUAL VISIT (OUTPATIENT)
Dept: FAMILY MEDICINE | Facility: CLINIC | Age: 75
End: 2025-03-03
Payer: COMMERCIAL

## 2025-03-03 DIAGNOSIS — G89.29 CHRONIC NONINTRACTABLE HEADACHE, UNSPECIFIED HEADACHE TYPE: ICD-10-CM

## 2025-03-03 DIAGNOSIS — R51.9 CHRONIC NONINTRACTABLE HEADACHE, UNSPECIFIED HEADACHE TYPE: ICD-10-CM

## 2025-03-03 DIAGNOSIS — I77.71 CAROTID ARTERY DISSECTION: Primary | ICD-10-CM

## 2025-03-03 PROCEDURE — 98014 SYNCH AUDIO-ONLY EST MOD 30: CPT | Performed by: NURSE PRACTITIONER

## 2025-03-03 NOTE — PROGRESS NOTES
"Harlan is a 74 year old who is being evaluated via a billable telephone visit.    What phone number would you like to be contacted at? [phone]  How would you like to obtain your AVS? Anithahargayle  Originating Location (pt. Location): Home    Distant Location (provider location):  Off-site  Telephone visit completed due to the patient did not have access to video, while the distant provider did.    Assessment & Plan     Carotid artery dissection  Previous left ICA dissection, now CT scan showing Right ICA near occlusion.  Has follow up with neurosurgery this week to discuss ongoing management. Reviewed CT results with the patient.    Chronic nonintractable headache, unspecified headache type  Reports improved headaches over the weekend.  Ongoing for 4 weeks. Using less ibuprofen and we discussed tapering off and continuing with tylenol only if able. Will consider referral to neurology if needed, however, uncertain if this could be related to ICA dissection above.     The longitudinal plan of care for the diagnosis(es)/condition(s) as documented were addressed during this visit. Due to the added complexity in care, I will continue to support Harlan in the subsequent management and with ongoing continuity of care.            Subjective   Harlan is a 74 year old, presenting for the following health issues:  Results (Results about US and CTA)      3/3/2025     9:01 AM   Additional Questions   Roomed by Emily Lara     History of Present Illness       Headaches:   Since the patient's last clinic visit, headaches are: no change  The patient is getting headaches:  Continuously  He is not able to do normal daily activities when he has a migraine.  The patient is taking the following rescue/relief medications:  Ibuprofen (Advil, Motrin) and Tylenol   Patient states \"I get some relief\" from the rescue/relief medications.   The patient is taking the following medications to prevent migraines:  No medications to prevent " migraines  In the past 4 weeks, the patient has gone to an Urgent Care or Emergency Room 1 time times due to headaches.    He eats 0-1 servings of fruits and vegetables daily.He consumes 1 sweetened beverage(s) daily.He exercises with enough effort to increase his heart rate 9 or less minutes per day.  He exercises with enough effort to increase his heart rate 3 or less days per week.         Was seen at [place] in 2015 for initial dissection on left side.  Headaches are better.  Taking less ibuprofen, tylenol is better.            Objective           Vitals:  No vitals were obtained today due to virtual visit.    Physical Exam   General: Alert and no distress //Respiratory: No audible wheeze, cough, or shortness of breath // Psychiatric:  Appropriate affect, tone, and pace of words            Phone call duration: 10 minutes  Signed Electronically by: Megan Landry DNP

## 2025-03-03 NOTE — PROGRESS NOTES
"Harlan is a 74 year old who is being evaluated via a billable telephone visit.    How would you like to obtain your AVS? MyChart  If the video visit is dropped, the invitation should be resent by: Text to cell phone: [phone]  Will anyone else be joining your video visit? No  Originating Location (pt. Location): {patient location:601161::\"Home\"}  {PROVIDER LOCATION On-site should be selected for visits conducted from your clinic location or adjoining [facility] hospital, academic office, or other nearby [facility] building. Off-site should be selected for all other provider locations, including home:420943}  Distant Location (provider location):  {virtual location provider:085288}  Telephone visit completed due to {audio only reason:573608}    {PROVIDER CHARTING PREFERENCE:810721}    Subjective   Harlan is a 74 year old, presenting for the following health issues:  Results (Results about US and CTA)        3/3/2025     9:01 AM   Additional Questions   Roomed by Emily Lara     History of Present Illness       Headaches:   Since the patient's last clinic visit, headaches are: no change  The patient is getting headaches:  Continuously  He is not able to do normal daily activities when he has a migraine.  The patient is taking the following rescue/relief medications:  Ibuprofen (Advil, Motrin) and Tylenol   Patient states \"I get some relief\" from the rescue/relief medications.   The patient is taking the following medications to prevent migraines:  No medications to prevent migraines  In the past 4 weeks, the patient has gone to an Urgent Care or Emergency Room 1 time times due to headaches.    He eats 0-1 servings of fruits and vegetables daily.He consumes 1 sweetened beverage(s) daily.He exercises with enough effort to increase his heart rate 9 or less minutes per day.  He exercises with enough effort to increase his heart rate 3 or less days per week.         {SUPERLIST (Optional):490368}  {additonal problems for provider to " add (Optional):418900}    {ROS Picklists (Optional):955250}      Objective           Vitals:  No vitals were obtained today due to virtual visit.    Physical Exam   General: Alert and no distress //Respiratory: No audible wheeze, cough, or shortness of breath // Psychiatric:  Appropriate affect, tone, and pace of words      {Diagnostic Test Results (Optional):705720}      Phone call duration: *** minutes  Signed Electronically by: Megan Landry DNP  {Email feedback regarding this note to [email or web address]   :040426}

## 2025-03-05 ENCOUNTER — PRE VISIT (OUTPATIENT)
Dept: NEUROLOGY | Facility: CLINIC | Age: 75
End: 2025-03-05

## 2025-03-05 ENCOUNTER — TELEPHONE (OUTPATIENT)
Dept: VASCULAR SURGERY | Facility: CLINIC | Age: 75
End: 2025-03-05

## 2025-03-05 ENCOUNTER — OFFICE VISIT (OUTPATIENT)
Dept: NEUROSURGERY | Facility: CLINIC | Age: 75
End: 2025-03-05
Attending: NURSE PRACTITIONER
Payer: COMMERCIAL

## 2025-03-05 VITALS
WEIGHT: 173 LBS | SYSTOLIC BLOOD PRESSURE: 155 MMHG | OXYGEN SATURATION: 98 % | HEART RATE: 75 BPM | HEIGHT: 69 IN | DIASTOLIC BLOOD PRESSURE: 91 MMHG | BODY MASS INDEX: 25.62 KG/M2

## 2025-03-05 DIAGNOSIS — I65.21 ICAO (INTERNAL CAROTID ARTERY OCCLUSION), RIGHT: ICD-10-CM

## 2025-03-05 DIAGNOSIS — I77.71 CAROTID ARTERY DISSECTION: ICD-10-CM
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
## 2025-03-05 NOTE — TELEPHONE ENCOUNTER
Vascular Referral Intake    Appointment note (to be pasted into appt note. Also add where additional info is located ie: outside images pushed to PACS, in Epic, sent to HIM, etc):   Referred by Darlene Grossman MD for bilateral ICA spontaneous dissections    Specialty: Vascular Medicine    Specific Provider if Necessary:  Dr. Leonora Sanchez    Visit Type: New    Time Frame: Next Available    Testing/Imaging Needed Before Consult: None    Jessy or bed needed: No    *Schedulers: Please send welcome letter to patient after appointment(s) scheduled*

## 2025-03-05 NOTE — NURSING NOTE
"Ronald Pearson is a 74 year old male who presents for:  Chief Complaint   Patient presents with    Consult For     Referred by Megan Landry DNP in [facility]  Carotid artery dissection   ICAO (internal carotid artery occlusion), right, Cerebrovascular           Initial Vitals:  BP (!) 155/91   Pulse 75   Ht 1.74 m (5' 8.5\")   Wt 78.5 kg (173 lb)   SpO2 98%   BMI 25.92 kg/m   Estimated body mass index is 25.92 kg/m  as calculated from the following:    Height as of this encounter: 1.74 m (5' 8.5\").    Weight as of this encounter: 78.5 kg (173 lb).. Body surface area is 1.95 meters squared. BP completed using cuff size: spencer Ingram CMA    "
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
## 2025-03-11 ENCOUNTER — DOCUMENTATION ONLY (OUTPATIENT)
Dept: UROLOGY | Facility: CLINIC | Age: 75
End: 2025-03-11
Payer: COMMERCIAL

## 2025-03-17 ENCOUNTER — OFFICE VISIT (OUTPATIENT)
Facility: CLINIC | Age: 75
End: 2025-03-17
Payer: COMMERCIAL

## 2025-03-17 DIAGNOSIS — F41.1 GAD (GENERALIZED ANXIETY DISORDER): Primary | ICD-10-CM

## 2025-03-17 PROCEDURE — 90834 PSYTX W PT 45 MINUTES: CPT
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
## 2025-03-23 ENCOUNTER — ANCILLARY PROCEDURE (OUTPATIENT)
Dept: MRI IMAGING | Facility: CLINIC | Age: 75
End: 2025-03-23
Attending: STUDENT IN AN ORGANIZED HEALTH CARE EDUCATION/TRAINING PROGRAM
Payer: COMMERCIAL

## 2025-03-23 DIAGNOSIS — I77.71 CAROTID ARTERY DISSECTION: ICD-10-CM

## 2025-03-23 PROCEDURE — 99207 MRA BRAIN (CIRCLE OF WILLIS) W/O CONTRAST: CPT | Performed by: RADIOLOGY

## 2025-03-23 PROCEDURE — A9585 GADOBUTROL INJECTION: HCPCS | Mod: JZ | Performed by: RADIOLOGY

## 2025-03-23 PROCEDURE — 70549 MR ANGIOGRAPH NECK W/O&W/DYE: CPT | Performed by: RADIOLOGY

## 2025-03-23 PROCEDURE — 70553 MRI BRAIN STEM W/O & W/DYE: CPT | Performed by: RADIOLOGY

## 2025-03-23 RX ORDER — GADOBUTROL 604.72 MG/ML
7.5 INJECTION INTRAVENOUS ONCE
Status: COMPLETED | OUTPATIENT
Start: 2025-03-23 | End: 2025-03-23

## 2025-03-23 RX ADMIN — GADOBUTROL 7.5 ML: 604.72 INJECTION INTRAVENOUS at 08:18

## 2025-03-26 ENCOUNTER — OFFICE VISIT (OUTPATIENT)
Dept: NEUROSURGERY | Facility: CLINIC | Age: 75
End: 2025-03-26
Payer: COMMERCIAL

## 2025-03-26 VITALS
WEIGHT: 173 LBS | HEART RATE: 73 BPM | DIASTOLIC BLOOD PRESSURE: 77 MMHG | SYSTOLIC BLOOD PRESSURE: 132 MMHG | HEIGHT: 69 IN | OXYGEN SATURATION: 99 % | BODY MASS INDEX: 25.62 KG/M2

## 2025-03-26 DIAGNOSIS — I77.71 CAROTID ARTERY DISSECTION: Primary | ICD-10-CM

## 2025-03-26 NOTE — NURSING NOTE
"Ronald Pearson is a 74 year old male who presents for:  Chief Complaint   Patient presents with    Stroke     Images follow up        Initial Vitals:  Ht 1.74 m (5' 8.5\")   Wt 78.5 kg (173 lb)   BMI 25.92 kg/m   Estimated body mass index is 25.92 kg/m  as calculated from the following:    Height as of this encounter: 1.74 m (5' 8.5\").    Weight as of this encounter: 78.5 kg (173 lb).. Body surface area is 1.95 meters squared. BP completed using cuff size: spencer Ingram CMA    "
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
## 2025-04-02 ENCOUNTER — TRANSFERRED RECORDS (OUTPATIENT)
Dept: HEALTH INFORMATION MANAGEMENT | Facility: CLINIC | Age: 75
End: 2025-04-02
Payer: COMMERCIAL

## 2025-04-03 ENCOUNTER — TELEPHONE (OUTPATIENT)
Dept: VASCULAR SURGERY | Facility: CLINIC | Age: 75
End: 2025-04-03
Payer: COMMERCIAL

## 2025-04-03 NOTE — TELEPHONE ENCOUNTER
[place] [facility] [place]  I LVM & SENT [facility] MSG PT WILL NEED TO DONNELL DUE TO PROV SCHEDULE ADJUSTMENT.  Yinka Davis on 4/3/2025 at 12:42 PM

## 2025-04-10 ENCOUNTER — OFFICE VISIT (OUTPATIENT)
Dept: OTHER | Facility: CLINIC | Age: 75
End: 2025-04-10
Attending: INTERNAL MEDICINE
Payer: COMMERCIAL

## 2025-04-10 ENCOUNTER — LAB (OUTPATIENT)
Dept: LAB | Facility: CLINIC | Age: 75
End: 2025-04-10
Attending: INTERNAL MEDICINE
Payer: COMMERCIAL

## 2025-04-10 VITALS
SYSTOLIC BLOOD PRESSURE: 152 MMHG | WEIGHT: 176 LBS | OXYGEN SATURATION: 99 % | BODY MASS INDEX: 26.37 KG/M2 | DIASTOLIC BLOOD PRESSURE: 86 MMHG | HEART RATE: 91 BPM

## 2025-04-10 DIAGNOSIS — I73.00 RAYNAUD'S DISEASE WITHOUT GANGRENE: ICD-10-CM

## 2025-04-10 DIAGNOSIS — I10 BENIGN ESSENTIAL HYPERTENSION: ICD-10-CM

## 2025-04-10 DIAGNOSIS — I73.89 ACROCYANOSIS: ICD-10-CM

## 2025-04-10 DIAGNOSIS — I77.71 CAROTID ARTERY DISSECTION: ICD-10-CM

## 2025-04-10 DIAGNOSIS — E78.5 HYPERLIPIDEMIA LDL GOAL <70: ICD-10-CM

## 2025-04-10 DIAGNOSIS — I63.89 CEREBROVASCULAR ACCIDENT (CVA) DUE TO OTHER MECHANISM (H): ICD-10-CM

## 2025-04-10 DIAGNOSIS — R23.1 LIVEDO RETICULARIS: ICD-10-CM

## 2025-04-10 DIAGNOSIS — Z87.891 FORMER SMOKER: ICD-10-CM

## 2025-04-10 DIAGNOSIS — I77.71 CAROTID ARTERY DISSECTION: Primary | ICD-10-CM

## 2025-04-10 LAB
CRP SERPL-MCNC: <3 MG/L
ERYTHROCYTE [SEDIMENTATION RATE] IN BLOOD BY WESTERGREN METHOD: 11 MM/HR (ref 0–20)
RHEUMATOID FACT SERPL-ACNC: <10 IU/ML

## 2025-04-10 PROCEDURE — G0463 HOSPITAL OUTPT CLINIC VISIT: HCPCS | Performed by: INTERNAL MEDICINE

## 2025-04-10 PROCEDURE — 85652 RBC SED RATE AUTOMATED: CPT

## 2025-04-10 PROCEDURE — 86147 CARDIOLIPIN ANTIBODY EA IG: CPT

## 2025-04-10 PROCEDURE — 82595 ASSAY OF CRYOGLOBULIN: CPT

## 2025-04-10 PROCEDURE — 86431 RHEUMATOID FACTOR QUANT: CPT

## 2025-04-10 PROCEDURE — 86140 C-REACTIVE PROTEIN: CPT

## 2025-04-10 PROCEDURE — 86038 ANTINUCLEAR ANTIBODIES: CPT

## 2025-04-10 PROCEDURE — 86036 ANCA SCREEN EACH ANTIBODY: CPT

## 2025-04-10 PROCEDURE — 85730 THROMBOPLASTIN TIME PARTIAL: CPT

## 2025-04-10 PROCEDURE — 86146 BETA-2 GLYCOPROTEIN ANTIBODY: CPT

## 2025-04-10 PROCEDURE — 36415 COLL VENOUS BLD VENIPUNCTURE: CPT

## 2025-04-10 PROCEDURE — 85390 FIBRINOLYSINS SCREEN I&R: CPT | Mod: 26 | Performed by: PATHOLOGY

## 2025-04-10 RX ORDER — AMLODIPINE BESYLATE 5 MG/1
5 TABLET ORAL DAILY
Qty: 30 TABLET | Refills: 5 | Status: SHIPPED | OUTPATIENT
Start: 2025-04-10

## 2025-04-10 NOTE — PROGRESS NOTES
[facility]        Patient is here for a consult.    Pt is currently taking Aspirin and Statin.    BP (!) 152/86 (BP Location: Left arm, Patient Position: Sitting, Cuff Size: Adult Large)   Pulse 91   Wt 176 lb (79.8 kg)   SpO2 99%   BMI 26.37 kg/m      The provider has been notified that the patient has no concerns.     Questions patient would like addressed today are: N/A.    Refills are needed: N/A    Has homecare services and agency name:  Tita Bustamante MA
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
## 2025-04-10 NOTE — PATIENT INSTRUCTIONS
Take amlodipine 5 mg daily new Rx sent     Go for labs order placed     Go for CTA of chest, abdomen, pelvis with leg run off , our staff will coordinate    Follow up after completion of  all tests    Continue rest of the medications same     Thermal protection     Raynaud Phenomenon     What is Raynaud phenomenon?   Raynaud phenomenon (RP) is a name given to episodes of color changes (usually pale or blue) in the hands and feet in response to the cold or emotional stress. The episodes usually come on suddenly and last minutes to hours. At first it might affect only one finger or toe, but may, over time, spread to other fingers and toes. There is sometimes a clear line at which the color changes from normal to discolored. It may affect both hands equally and may cause numbness, tingling, or an aching pain.  Sometimes the arms or legs may get mottled, and it can also affect other areas of the body, such as the ears, nose, and face. Upon warming, the hands and feet usually become red or flushed and they may feel swollen or itchy.     Who gets it?   RP is fairly common, especially in regions with colder climates. It affects girls somewhat more often than boys.  It often runs in families.      Most people with RP do not have an underlying rheumatic disease and will not go on to develop one. RP can be associated with rheumatic diseases including lupus, systemic sclerosis, and mixed connective tissue disease (MCTD), however these patients will have additional signs and symptoms of those diseases.     What causes it?   Normally, blood vessels constrict in response to cold temperatures in order to keep the body warm. It is thought that in RP, there is over-activation of blood vessel constriction in response to cold (and stress and exercise). This constriction leads to limited blood supply to the skin resulting in paleness. The blue color results because the tissue in the hands and feet are extracting so much oxygen from the  slow-flowing blood. The fingers and toes appear red when re-warmed because of exaggerated blood flow through the blood vessels. These episodes of decreased and increased blood flow are mainly a nuisance, and do not (with rare exceptions) damage the fingers or toes.    How is it diagnosed?   RP is diagnosed based upon the story and exam by your doctor. There are no simple tests to help diagnose RP. Your doctor would ask questions to be sure there is no associated rheumatic disease, do a general physical examination, and carefully look at your nailfold capillaries with a magnifier in the office.  Abnormalities in the nailfold capillaries may indicate an underlying rheumatic disease. Lab testing, such as an DANY, is not routinely necessary unless there are other reasons to suspect a rheumatic disease.     How is it treated?  Mild RP can be treated with simple measures, such as dressing warmly (for example wearing mittens, long underwear, and feet warmers), avoiding sudden cold exposures, minimizing emotional stress, and avoiding other aggravating factors (such as cigarette smoke, and stimulants such as decongestants and diet pills).     Children can learn through biofeedback training how to increase the blood flow to their fingers and toes and increase their skin temperatures. There are several medications that can be helpful, and the most commonly used medication is a calcium-channel blocker called nifedipine or nifedipine

## 2025-04-11 LAB
ANA SER QL IF: NEGATIVE
ANCA AB PATTERN SER IF-IMP: NORMAL
C-ANCA TITR SER IF: NORMAL {TITER}
DRVVT SCREEN RATIO: 0.87
INR PPP: 0.99 (ref 0.85–1.15)
LA PPP-IMP: NEGATIVE
LUPUS INTERPRETATION: NORMAL
PTT RATIO: 1.06
THROMBIN TIME: 17.4 SECONDS (ref 13–19)

## 2025-04-14 LAB
B2 GLYCOPROT1 IGG SERPL IA-ACNC: 1.3 U/ML
B2 GLYCOPROT1 IGM SERPL IA-ACNC: <2.4 U/ML
CARDIOLIPIN IGG SER IA-ACNC: 2.8 GPL-U/ML
CARDIOLIPIN IGG SER IA-ACNC: NEGATIVE
CARDIOLIPIN IGM SER IA-ACNC: <2 MPL-U/ML
CARDIOLIPIN IGM SER IA-ACNC: NEGATIVE

## 2025-04-15 ENCOUNTER — TELEPHONE (OUTPATIENT)
Dept: OTHER | Facility: CLINIC | Age: 75
End: 2025-04-15
Payer: COMMERCIAL

## 2025-04-15 DIAGNOSIS — I77.71 CAROTID ARTERY DISSECTION: Primary | ICD-10-CM

## 2025-04-15 DIAGNOSIS — I63.89 CEREBROVASCULAR ACCIDENT (CVA) DUE TO OTHER MECHANISM (H): ICD-10-CM

## 2025-04-15 NOTE — TELEPHONE ENCOUNTER
Routing to scheduling to coordinate the following:    CTA chest abdomen pelvis with runoff  In person follow up 1 week after tests  Please schedule this next available     Appt note: Follow up to 4/10/25    Monet HART, ABIGAIL    [facility]  Office: [phone]  Fax: [phone]

## 2025-04-16 LAB — CRYOGLOB SER QL: NEGATIVE

## 2025-04-21 ENCOUNTER — VIRTUAL VISIT (OUTPATIENT)
Facility: CLINIC | Age: 75
End: 2025-04-21
Payer: COMMERCIAL

## 2025-04-21 DIAGNOSIS — F41.1 GAD (GENERALIZED ANXIETY DISORDER): Primary | ICD-10-CM

## 2025-04-21 PROCEDURE — 90837 PSYTX W PT 60 MINUTES: CPT | Mod: 95

## 2025-04-21 ASSESSMENT — ANXIETY QUESTIONNAIRES
8. IF YOU CHECKED OFF ANY PROBLEMS, HOW DIFFICULT HAVE THESE MADE IT FOR YOU TO DO YOUR WORK, TAKE CARE OF THINGS AT HOME, OR GET ALONG WITH OTHER PEOPLE?: NOT DIFFICULT AT ALL
3. WORRYING TOO MUCH ABOUT DIFFERENT THINGS: NOT AT ALL
IF YOU CHECKED OFF ANY PROBLEMS ON THIS QUESTIONNAIRE, HOW DIFFICULT HAVE THESE PROBLEMS MADE IT FOR YOU TO DO YOUR WORK, TAKE CARE OF THINGS AT HOME, OR GET ALONG WITH OTHER PEOPLE: NOT DIFFICULT AT ALL
4. TROUBLE RELAXING: NOT AT ALL
GAD7 TOTAL SCORE: 0
GAD7 TOTAL SCORE: 0
6. BECOMING EASILY ANNOYED OR IRRITABLE: NOT AT ALL
1. FEELING NERVOUS, ANXIOUS, OR ON EDGE: NOT AT ALL
7. FEELING AFRAID AS IF SOMETHING AWFUL MIGHT HAPPEN: NOT AT ALL
GAD7 TOTAL SCORE: 0
2. NOT BEING ABLE TO STOP OR CONTROL WORRYING: NOT AT ALL
7. FEELING AFRAID AS IF SOMETHING AWFUL MIGHT HAPPEN: NOT AT ALL
5. BEING SO RESTLESS THAT IT IS HARD TO SIT STILL: NOT AT ALL

## 2025-04-21 ASSESSMENT — PATIENT HEALTH QUESTIONNAIRE - PHQ9
SUM OF ALL RESPONSES TO PHQ QUESTIONS 1-9: 3
SUM OF ALL RESPONSES TO PHQ QUESTIONS 1-9: 3
10. IF YOU CHECKED OFF ANY PROBLEMS, HOW DIFFICULT HAVE THESE PROBLEMS MADE IT FOR YOU TO DO YOUR WORK, TAKE CARE OF THINGS AT HOME, OR GET ALONG WITH OTHER PEOPLE: SOMEWHAT DIFFICULT
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
## 2025-04-23 ENCOUNTER — TELEPHONE (OUTPATIENT)
Dept: UROLOGY | Facility: CLINIC | Age: 75
End: 2025-04-23
Payer: COMMERCIAL

## 2025-04-28 ENCOUNTER — HOSPITAL ENCOUNTER (OUTPATIENT)
Dept: CT IMAGING | Facility: CLINIC | Age: 75
Discharge: HOME | End: 2025-04-28
Attending: INTERNAL MEDICINE | Admitting: INTERNAL MEDICINE
Payer: COMMERCIAL

## 2025-04-28 DIAGNOSIS — I77.71 CAROTID ARTERY DISSECTION: ICD-10-CM

## 2025-04-28 DIAGNOSIS — I63.89 CEREBROVASCULAR ACCIDENT (CVA) DUE TO OTHER MECHANISM (H): ICD-10-CM

## 2025-04-28 LAB
CREAT BLD-MCNC: 1 MG/DL (ref 0.7–1.2)
EGFRCR SERPLBLD CKD-EPI 2021: >60 ML/MIN/1.73M2

## 2025-04-28 PROCEDURE — 250N000009 HC RX 250: Performed by: INTERNAL MEDICINE

## 2025-04-28 PROCEDURE — 250N000011 HC RX IP 250 OP 636: Performed by: INTERNAL MEDICINE

## 2025-04-28 PROCEDURE — 75635 CT ANGIO ABDOMINAL ARTERIES: CPT

## 2025-04-28 PROCEDURE — 82565 ASSAY OF CREATININE: CPT

## 2025-04-28 RX ORDER — IOPAMIDOL 755 MG/ML
100 INJECTION, SOLUTION INTRAVASCULAR ONCE
Status: COMPLETED | OUTPATIENT
Start: 2025-04-28 | End: 2025-04-28

## 2025-04-28 RX ADMIN — IOPAMIDOL 100 ML: 755 INJECTION, SOLUTION INTRAVENOUS at 08:58

## 2025-04-28 RX ADMIN — SODIUM CHLORIDE 80 ML: 9 INJECTION, SOLUTION INTRAVENOUS at 08:58

## 2025-05-06 ENCOUNTER — OFFICE VISIT (OUTPATIENT)
Dept: OTHER | Facility: CLINIC | Age: 75
End: 2025-05-06
Attending: INTERNAL MEDICINE
Payer: COMMERCIAL

## 2025-05-06 VITALS
BODY MASS INDEX: 25.77 KG/M2 | HEART RATE: 67 BPM | OXYGEN SATURATION: 97 % | DIASTOLIC BLOOD PRESSURE: 76 MMHG | WEIGHT: 172 LBS | SYSTOLIC BLOOD PRESSURE: 136 MMHG

## 2025-05-06 DIAGNOSIS — I63.89 CEREBROVASCULAR ACCIDENT (CVA) DUE TO OTHER MECHANISM (H): ICD-10-CM

## 2025-05-06 DIAGNOSIS — I10 BENIGN ESSENTIAL HYPERTENSION: ICD-10-CM

## 2025-05-06 DIAGNOSIS — E78.5 HYPERLIPIDEMIA LDL GOAL <70: ICD-10-CM

## 2025-05-06 DIAGNOSIS — I73.00 RAYNAUD'S DISEASE WITHOUT GANGRENE: ICD-10-CM

## 2025-05-06 DIAGNOSIS — I77.71 CAROTID ARTERY DISSECTION: Primary | ICD-10-CM

## 2025-05-06 DIAGNOSIS — R91.8 PULMONARY NODULES: ICD-10-CM

## 2025-05-06 PROCEDURE — 99215 OFFICE O/P EST HI 40 MIN: CPT | Performed by: INTERNAL MEDICINE

## 2025-05-06 PROCEDURE — G0463 HOSPITAL OUTPT CLINIC VISIT: HCPCS | Performed by: INTERNAL MEDICINE

## 2025-05-06 PROCEDURE — 3078F DIAST BP <80 MM HG: CPT | Performed by: INTERNAL MEDICINE

## 2025-05-06 PROCEDURE — G2211 COMPLEX E/M VISIT ADD ON: HCPCS | Performed by: INTERNAL MEDICINE

## 2025-05-06 PROCEDURE — 3075F SYST BP GE 130 - 139MM HG: CPT | Performed by: INTERNAL MEDICINE

## 2025-05-06 RX ORDER — AMLODIPINE BESYLATE 5 MG/1
5 TABLET ORAL DAILY
Qty: 90 TABLET | Refills: 3 | Status: SHIPPED | OUTPATIENT
Start: 2025-05-06
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
## 2025-05-06 NOTE — PATIENT INSTRUCTIONS
Repeat CT chest for follow up nodules in 6 months etc then visit with me     Recent labs looks good     Continue current medications , refilled amlodipine

## 2025-05-06 NOTE — PROGRESS NOTES
[facility]        Patient is here for a follow up.    Pt is currently taking Aspirin and Statin.    /76 (BP Location: Right arm, Patient Position: Sitting, Cuff Size: Adult Regular)   Pulse 67   Wt 172 lb (78 kg)   SpO2 97%   BMI 25.77 kg/m      The provider has been notified that the patient has no concerns.     Questions patient would like addressed today are: N/A.    Refills are needed: No    Has homecare services and agency name:  Tita Bustamante MA

## 2025-05-19 ENCOUNTER — OFFICE VISIT (OUTPATIENT)
Facility: CLINIC | Age: 75
End: 2025-05-19
Payer: COMMERCIAL

## 2025-05-19 DIAGNOSIS — F41.1 GAD (GENERALIZED ANXIETY DISORDER): Primary | ICD-10-CM

## 2025-05-19 PROCEDURE — 90837 PSYTX W PT 60 MINUTES: CPT
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
## 2025-06-02 ENCOUNTER — OFFICE VISIT (OUTPATIENT)
Facility: CLINIC | Age: 75
End: 2025-06-02
Payer: COMMERCIAL

## 2025-06-02 DIAGNOSIS — F41.1 GAD (GENERALIZED ANXIETY DISORDER): Primary | ICD-10-CM

## 2025-06-02 PROCEDURE — 90837 PSYTX W PT 60 MINUTES: CPT
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
## 2025-06-23 ENCOUNTER — OFFICE VISIT (OUTPATIENT)
Dept: FAMILY MEDICINE | Facility: CLINIC | Age: 75
End: 2025-06-23
Payer: COMMERCIAL

## 2025-06-23 VITALS
RESPIRATION RATE: 16 BRPM | SYSTOLIC BLOOD PRESSURE: 128 MMHG | WEIGHT: 167 LBS | HEIGHT: 68 IN | OXYGEN SATURATION: 98 % | HEART RATE: 76 BPM | DIASTOLIC BLOOD PRESSURE: 62 MMHG | BODY MASS INDEX: 25.31 KG/M2 | TEMPERATURE: 97.1 F

## 2025-06-23 DIAGNOSIS — Z86.79 HISTORY OF CAROTID ARTERY DISSECTION: ICD-10-CM

## 2025-06-23 DIAGNOSIS — Z11.3 SCREENING EXAMINATION FOR VENEREAL DISEASE: ICD-10-CM

## 2025-06-23 DIAGNOSIS — R91.8 PULMONARY NODULES: ICD-10-CM

## 2025-06-23 LAB
ALBUMIN SERPL BCG-MCNC: 4.2 G/DL (ref 3.5–5.2)
ALP SERPL-CCNC: 73 U/L (ref 40–150)
ALT SERPL W P-5'-P-CCNC: 21 U/L (ref 0–70)
AST SERPL W P-5'-P-CCNC: 30 U/L (ref 0–45)
BILIRUB SERPL-MCNC: 0.6 MG/DL
BILIRUBIN DIRECT (ROCHE PRO & PURE): 0.26 MG/DL (ref 0–0.45)
CHOLEST SERPL-MCNC: 145 MG/DL
FASTING STATUS PATIENT QL REPORTED: NO
HDLC SERPL-MCNC: 54 MG/DL
HIV 1+2 AB+HIV1 P24 AG SERPL QL IA: NONREACTIVE
LDLC SERPL CALC-MCNC: 74 MG/DL
NONHDLC SERPL-MCNC: 91 MG/DL
PROT SERPL-MCNC: 7.1 G/DL (ref 6.4–8.3)
T PALLIDUM AB SER QL: NONREACTIVE
TRIGL SERPL-MCNC: 85 MG/DL

## 2025-06-23 PROCEDURE — 99214 OFFICE O/P EST MOD 30 MIN: CPT | Performed by: INTERNAL MEDICINE

## 2025-06-23 PROCEDURE — 36415 COLL VENOUS BLD VENIPUNCTURE: CPT | Performed by: INTERNAL MEDICINE

## 2025-06-23 PROCEDURE — 3074F SYST BP LT 130 MM HG: CPT | Performed by: INTERNAL MEDICINE

## 2025-06-23 PROCEDURE — 80061 LIPID PANEL: CPT | Mod: GZ | Performed by: INTERNAL MEDICINE

## 2025-06-23 PROCEDURE — 80076 HEPATIC FUNCTION PANEL: CPT | Performed by: INTERNAL MEDICINE

## 2025-06-23 PROCEDURE — 1126F AMNT PAIN NOTED NONE PRSNT: CPT | Performed by: INTERNAL MEDICINE

## 2025-06-23 PROCEDURE — 87591 N.GONORRHOEAE DNA AMP PROB: CPT | Performed by: INTERNAL MEDICINE

## 2025-06-23 PROCEDURE — 87389 HIV-1 AG W/HIV-1&-2 AB AG IA: CPT | Mod: GZ | Performed by: INTERNAL MEDICINE

## 2025-06-23 PROCEDURE — 86780 TREPONEMA PALLIDUM: CPT | Performed by: INTERNAL MEDICINE

## 2025-06-23 PROCEDURE — 3078F DIAST BP <80 MM HG: CPT | Performed by: INTERNAL MEDICINE

## 2025-06-23 PROCEDURE — 87491 CHLMYD TRACH DNA AMP PROBE: CPT | Performed by: INTERNAL MEDICINE

## 2025-06-23 ASSESSMENT — PAIN SCALES - GENERAL: PAINLEVEL_OUTOF10: NO PAIN (0)

## 2025-06-23 NOTE — PROGRESS NOTES
Assessment & Plan     (Z86.79) History of carotid artery dissection  Comment: Following with NeuroSx and Vasc- labs and CT reassuring- no FMD.  BP goal <130/80- cont current meds, on rosuvastatin and ASA.  Check labs.  Plan: Lipid panel reflex to direct LDL Non-fasting,         Hepatic panel (Albumin, ALT, AST, Bili, Alk         Phos, TP)    (R91.8) Pulmonary nodules  Comment: Repeat CT in 3-6 mos- ordered.  Plan: CT Chest w/o Contrast    (Z11.3) Screening examination for venereal disease  Comment: Ordered  Plan: Chlamydia trachomatis/Neisseria gonorrhoeae by         PCR (first-voided urine), HIV Antigen Antibody         Combo Cascade, Treponema Abs w Reflex to RPR         and Titer          MED REC REQUIRED  Post Medication Reconciliation Status:  Discharge medications reconciled, continue medications without change        Subjective   Harlan is a 75 year old, presenting for the following health issues:  Hospital F/U and Establish Care        6/23/2025    12:59 PM   Additional Questions   Roomed by Emily Lara     HPI      Here to establish care.    Vascular- Dr. Najera- left ICA dissection in 2015 with stroke and also amaurosis, then had second spontaneous ICA dissection ~Feb 2025 with pain on right side of face.  - CTA rule out FMD (rec follow up ground glass nodules in 3-6 months, fatty infiltration, large prostate)  - levido reticularis with Raynaud's- ANCA, DANY, APLA, cryolob  - Started amlodipine  Neurosurgery 3/26/25- spontaneous ICA dissection (2nd one)  - Continue aspirin 81 mg daily  - MRA head/neck 3 months- scheduled July    Preventive visit 2/6/25- carotid Duplex showed dissection/stenosis- started on statin and aspirin.    Still having weird taste in his mouth- dating back to ICA dissection.  Smell intact.  No mouth dryness.   Trigeminal nerve pain resolved.     Back was sore right after starting rosuvastatin.  But had just started riding bike- monitoring back pain.  Seems like it may be getting  "better.        Objective    /62   Pulse 76   Temp 97.1  F (36.2  C) (Temporal)   Resp 16   Ht 1.727 m (5' 8\")   Wt 75.8 kg (167 lb)   SpO2 98%   BMI 25.39 kg/m    Body mass index is 25.39 kg/m .  Physical Exam   GENERAL: alert and no distress  HEENT: Oral mucosa normal.  MS: no gross musculoskeletal defects noted, no edema  PSYCH: mentation appears normal, affect normal/bright            Signed Electronically by: Emelyn Martínez,     "

## 2025-06-23 NOTE — PATIENT INSTRUCTIONS
Please buy a blood pressure cuff, check validatebp.org if you have questions which one to buy (should go on the upper arm).    Check your blood pressure twice each morning (if you drink coffee, before you have coffee ideally) and twice daily each evening and write down your results, do this for 3 days. Send your results via [employer] or bring to your next visit. Goal blood pressure is less than 130/80.     Home Blood Pressure Monitoring:    To prepare to take your blood pressure:  Do not consume any caffeinated beverages (tea, coffee, soda), do not smoke, do not exercise for 30 minutes before measuring your blood pressure.  Sit quietly for at least 5 minutes before starting to measure your blood pressure.     To measure your blood pressure:  Sit with both feet flat on the floor and your back supported. Do not stand, do not lie down.  Place the cuff on your arm above your elbow so that your elbow can bend comfortably.  Your arm should be resting on a table.  Pull the cuff tight enough to stay in place and allow for your fingers to fit between your arm and the cuff.  Position the cuff so that the cord lies down the inside of your arm.  Do not talk while you are using the machine.  Press the START button. It will squeeze your arm and then release slowly.   When you see the numbers on the screen, you are done.   Write the numbers down and the time of day so that you can track what they are.      Hypertension is the major risk factor for chronic kidney disease and stroke.  A systolic blood pressure (the upper number) of 150 is associated with an 8-fold increased risk of stroke compared to a systolic blood pressure of 110.      Hypertension can be treated with diet, exercise, and medication.  Some patients need medication to lower their blood pressure.    The DASH diet can help lower blood pressure.  It is a plant-based diet that focuses on fruits, vegetables, whole grains, low-fat dairy products, and very little meat.  It  includes one serving of nuts, seeds, or legumes per day.  Sodiums is limited to 2400 mg per day.          Diet Change    To help reduce blood pressure, it is recommended that individuals reduce their sodium content to 2,300 mg or 1,500 mg. Below are alternatives to high-sodium and high-fat foods.  Reducing Salt Content    Foods high in salt (sodium) Low-salt alternatives   smoked, cured, salted, and canned meat, fish, poultry unsalted fresh or frozen beef, lamb, pork, fish, poultry   regular hard and processed cheese  regular peanut butter low-sodium cheese  low-sodium peanut butter   crackers with salted tops unsalted crackers   regular canned and dehydrated soups  low-sodium soups, broths, bouillons   regular canned vegetables fresh and frozen vegetables    salted snack foods unsalted snack foods       Reducing Fat Content    Food category Foods high in fat Lower fat alternatives   Dairy  whole milk  ice cream    cheese  skim, 1%, 2% milk  sorbet, sherbert, frozen yogurt  low- or reduced-fat cheese   Pasta ramen noodles  pasta with cream sauce  granola rice  pasta with tomato sauce  reduced fat granola   Meat, fish, poultry ground beef  chicken or turkey with skin  hot dogs  leal sausage   oil packed tuna   whole eggs low-fat, extra-lean meats,  skinless chicken or turkey    low-fat hot dog    turkey leal  water-packed tuna  egg whites, egg substitute   Baked goods croissants   donuts  muffins,   party crackers  cake, cookies hard rolls, English muffins  bagels  reduced fat muffins  low-fat crackers  nadine food cake   Snacks and sweets nuts  ice cream popcorn, fruits, vegetables  frozen yogurt, pudding bars   Fats, oils, and salad dressings butter, margarine  mayonnaise  salad dressings  oils, shortening, lard light margarine  light mayonnaise, mustard  fat free salad dressing  nonstick cooking spray     What are the changes that you plan to make in your diet?    Reduce salt  by:_________________________________________________________    Reduce saturated fat by:__________________________________________    Reducing salt content data from  Alternatives to High-Sodium Foods,  by the U.S. Food and Drug Administration, n.d. Retrieved January 9, 2007, from http://www.fda.gov/fdac/foodlabel/sodtabl.html. Reducing fat content data from  The Practical Guide: Identification, Evaluation and Treatment of Overweight and Obesity in Adults  (NIH Publication No. ), by the National Institutes of Health, 2000. Retrieved January 9, 2007, from http://www.nhlbi.nih.gov/ guidelines/obesity/prctgd_c.pdf.

## 2025-06-24 LAB
C TRACH DNA SPEC QL PROBE+SIG AMP: NEGATIVE
N GONORRHOEA DNA SPEC QL NAA+PROBE: NEGATIVE
SPECIMEN TYPE: NORMAL

## 2025-06-26 ENCOUNTER — RESULTS FOLLOW-UP (OUTPATIENT)
Dept: FAMILY MEDICINE | Facility: CLINIC | Age: 75
End: 2025-06-26

## 2025-07-14 ENCOUNTER — VIRTUAL VISIT (OUTPATIENT)
Facility: CLINIC | Age: 75
End: 2025-07-14
Payer: COMMERCIAL

## 2025-07-14 ENCOUNTER — ANCILLARY PROCEDURE (OUTPATIENT)
Dept: MRI IMAGING | Facility: CLINIC | Age: 75
End: 2025-07-14
Attending: STUDENT IN AN ORGANIZED HEALTH CARE EDUCATION/TRAINING PROGRAM
Payer: COMMERCIAL

## 2025-07-14 DIAGNOSIS — I77.71 CAROTID ARTERY DISSECTION: ICD-10-CM

## 2025-07-14 DIAGNOSIS — F41.1 GAD (GENERALIZED ANXIETY DISORDER): Primary | ICD-10-CM

## 2025-07-14 PROCEDURE — 70544 MR ANGIOGRAPHY HEAD W/O DYE: CPT | Mod: GC | Performed by: RADIOLOGY

## 2025-07-14 PROCEDURE — 70549 MR ANGIOGRAPH NECK W/O&W/DYE: CPT | Mod: GC | Performed by: RADIOLOGY

## 2025-07-14 PROCEDURE — A9585 GADOBUTROL INJECTION: HCPCS | Mod: JZ | Performed by: RADIOLOGY

## 2025-07-14 PROCEDURE — 90837 PSYTX W PT 60 MINUTES: CPT | Mod: 95

## 2025-07-14 RX ORDER — GADOBUTROL 604.72 MG/ML
7.5 INJECTION INTRAVENOUS ONCE
Status: COMPLETED | OUTPATIENT
Start: 2025-07-14 | End: 2025-07-14

## 2025-07-14 RX ADMIN — GADOBUTROL 7.5 ML: 604.72 INJECTION INTRAVENOUS at 10:56

## 2025-07-14 NOTE — DISCHARGE INSTRUCTIONS
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
## 2025-07-16 ENCOUNTER — OFFICE VISIT (OUTPATIENT)
Dept: NEUROSURGERY | Facility: CLINIC | Age: 75
End: 2025-07-16
Payer: COMMERCIAL

## 2025-07-16 VITALS
WEIGHT: 167 LBS | OXYGEN SATURATION: 99 % | SYSTOLIC BLOOD PRESSURE: 125 MMHG | BODY MASS INDEX: 25.31 KG/M2 | HEART RATE: 77 BPM | DIASTOLIC BLOOD PRESSURE: 73 MMHG | HEIGHT: 68 IN

## 2025-07-16 DIAGNOSIS — I77.71 CAROTID ARTERY DISSECTION: ICD-10-CM

## 2025-07-16 RX ORDER — ROSUVASTATIN CALCIUM 20 MG/1
20 TABLET, COATED ORAL DAILY
Qty: 90 TABLET | Refills: 3 | Status: SHIPPED | OUTPATIENT
Start: 2025-07-16

## 2025-07-16 NOTE — PROGRESS NOTES
"[facility] NEUROLOGY [address], [address]  [facility] [address]  Phone: [phone]  Fax: [phone]    Neuroendovascular Clinic Note:  FOLLOW UP APPOINTMENT     HPI:   Ronald Pearson is a 75 year old right-handed man who is followed in the Neuroendovascular Clinic for bilatera carotid artery dissections and a right PCoA tiny aneurysm. He returns today for 3 month follow up to discuss repeat imaging.    INTERVAL HISTORY:   He was seen by Dr. Najera in Vascular Medicine who diagnosed Raynaud's and has ruled out other systemic vascular anomalies. He started a statin, but has some new back pain that is bothersome since starting this med. He has had no new neurologic symptoms.    Vital signs:      BP: 125/73 Pulse: 77     SpO2: 99 %     Height: 172.7 cm (5' 8\") Weight: 75.8 kg (167 lb)  Estimated body mass index is 25.39 kg/m  as calculated from the following:    Height as of this encounter: 1.727 m (5' 8\").    Weight as of this encounter: 75.8 kg (167 lb).    Physical Examination:  Vitals: /73   Pulse 77   Ht 1.727 m (5' 8\")   Wt 75.8 kg (167 lb)   SpO2 99%   BMI 25.39 kg/m    General: Adult male patient, seated in chair, NAD  Chest: non-labored on RA  Skin: No visible rash or lesion   Psych: Mood pleasant, affect congruent  Neuro:  Mental status: Awake, alert, attentive, oriented to self, time, place, and circumstance. Spontaneous language is fluent and coherent with intact comprehension. No visual, tactile, or hemispatial neglect.  Cranial nerves: VFF, PERRL, conjugate gaze, EOMI, face symmetric, shoulder shrug strong, tongue/uvula midline, no dysarthria.   Motor: No abnormal movements. Symmetric antigravity strength in 4/4 extremities.   Coordination: No ataxia of observed movements     Imaging findings:  MRA head reviewed- interval resolution of the right ICA petrocavernous segment dissection  R PCoA aneurysm unchanged.    Assessment/Plan:   Bilateral " spontaneous carotid artery dissections, now resolved, no underlying collagen vascular disease identified as etiology, however suspect that he remains susceptible to dissections in the future.    Continue ASA 81 mg daily as primary stroke prevention  Okay to trial decreasing crestor dose to 20 mg from 40 mg to see if this changes the back pain. LDL came down from 133 to 74. <100 would be my goal for this non-atherosclerotic carotid disease. Of course defer to Dr. Najera if he feels 40 is necessary.  Repeat MRA head and neck in 1 year, see me (virtual okay) after.    Darlene Grossman MD  Vascular Neurology  Endovascular Surgical Neuroradiology  [phone]    I spent a total of 40 minutes on this encounter, including reviewing the chart and neuroimaging, and >50% of the time was face-to-face with the patient.

## 2025-07-16 NOTE — LETTER
"7/16/2025      Ronald Pearson  [address]  [facility] [address]      Dear Colleague,    Thank you for referring your patient, Ronald Pearson, to the [facility]. Please see a copy of my visit note below.          [facility] [address], [address]  [facility] [address]  Phone: [phone]  Fax: [phone]    Neuroendovascular Clinic Note:  FOLLOW UP APPOINTMENT     HPI:   Ronald Pearson is a 75 year old right-handed man who is followed in the Neuroendovascular Clinic for bilatera carotid artery dissections and a right PCoA tiny aneurysm. He returns today for 3 month follow up to discuss repeat imaging.    INTERVAL HISTORY:   He was seen by Dr. Najera in Vascular Medicine who diagnosed Raynaud's and has ruled out other systemic vascular anomalies. He started a statin, but has some new back pain that is bothersome since starting this med. He has had no new neurologic symptoms.    Vital signs:      BP: 125/73 Pulse: 77     SpO2: 99 %     Height: 172.7 cm (5' 8\") Weight: 75.8 kg (167 lb)  Estimated body mass index is 25.39 kg/m  as calculated from the following:    Height as of this encounter: 1.727 m (5' 8\").    Weight as of this encounter: 75.8 kg (167 lb).    Physical Examination:  Vitals: /73   Pulse 77   Ht 1.727 m (5' 8\")   Wt 75.8 kg (167 lb)   SpO2 99%   BMI 25.39 kg/m    General: Adult male patient, seated in chair, NAD  Chest: non-labored on RA  Skin: No visible rash or lesion   Psych: Mood pleasant, affect congruent  Neuro:  Mental status: Awake, alert, attentive, oriented to self, time, place, and circumstance. Spontaneous language is fluent and coherent with intact comprehension. No visual, tactile, or hemispatial neglect.  Cranial nerves: VFF, PERRL, conjugate gaze, EOMI, face symmetric, shoulder shrug strong, tongue/uvula midline, no dysarthria.   Motor: No abnormal movements. Symmetric " antigravity strength in 4/4 extremities.   Coordination: No ataxia of observed movements     Imaging findings:  MRA head reviewed- interval resolution of the right ICA petrocavernous segment dissection  R PCoA aneurysm unchanged.    Assessment/Plan:   Bilateral spontaneous carotid artery dissections, now resolved, no underlying collagen vascular disease identified as etiology, however suspect that he remains susceptible to dissections in the future.    Continue ASA 81 mg daily as primary stroke prevention  Okay to trial decreasing crestor dose to 20 mg from 40 mg to see if this changes the back pain. LDL came down from 133 to 74. <100 would be my goal for this non-atherosclerotic carotid disease. Of course defer to Dr. Najera if he feels 40 is necessary.  Repeat MRA head and neck in 1 year, see me (virtual okay) after.    Darlene Grossman MD  Vascular Neurology  Endovascular Surgical Neuroradiology  [phone]    I spent a total of 40 minutes on this encounter, including reviewing the chart and neuroimaging, and >50% of the time was face-to-face with the patient.      Again, thank you for allowing me to participate in the care of your patient.        Sincerely,        Darlene Grossman MD    Electronically signed

## 2025-07-16 NOTE — NURSING NOTE
"Ronald Pearson is a 75 year old male who presents for:  Chief Complaint   Patient presents with    Follow Up     Images follow up        Initial Vitals:  /73   Pulse 77   Ht 1.727 m (5' 8\")   Wt 75.8 kg (167 lb)   SpO2 99%   BMI 25.39 kg/m   Estimated body mass index is 25.39 kg/m  as calculated from the following:    Height as of this encounter: 1.727 m (5' 8\").    Weight as of this encounter: 75.8 kg (167 lb).. Body surface area is 1.91 meters squared. BP completed using cuff size: spencer Ingram CMA    "

## 2025-08-19 ENCOUNTER — ANCILLARY PROCEDURE (OUTPATIENT)
Dept: CT IMAGING | Facility: CLINIC | Age: 75
End: 2025-08-19
Attending: INTERNAL MEDICINE
Payer: COMMERCIAL

## 2025-08-19 DIAGNOSIS — R91.8 PULMONARY NODULES: ICD-10-CM

## 2025-08-19 PROCEDURE — 71250 CT THORAX DX C-: CPT | Performed by: RADIOLOGY

## 2025-08-20 ENCOUNTER — VIRTUAL VISIT (OUTPATIENT)
Facility: CLINIC | Age: 75
End: 2025-08-20
Payer: COMMERCIAL

## 2025-08-20 DIAGNOSIS — F41.1 GAD (GENERALIZED ANXIETY DISORDER): Primary | ICD-10-CM

## 2025-08-20 PROCEDURE — 90837 PSYTX W PT 60 MINUTES: CPT | Mod: 95

## 2025-08-25 ENCOUNTER — PRE VISIT (OUTPATIENT)
Dept: UROLOGY | Facility: CLINIC | Age: 75
End: 2025-08-25
Payer: COMMERCIAL

## 2025-08-26 ENCOUNTER — LAB (OUTPATIENT)
Dept: LAB | Facility: CLINIC | Age: 75
End: 2025-08-26
Payer: COMMERCIAL

## 2025-08-26 DIAGNOSIS — R39.11 BENIGN PROSTATIC HYPERPLASIA WITH URINARY HESITANCY: ICD-10-CM

## 2025-08-26 DIAGNOSIS — N40.1 BENIGN PROSTATIC HYPERPLASIA WITH URINARY HESITANCY: ICD-10-CM

## 2025-08-26 LAB — PSA SERPL DL<=0.01 NG/ML-MCNC: 3.06 NG/ML (ref 0–6.5)

## 2025-08-26 PROCEDURE — 36415 COLL VENOUS BLD VENIPUNCTURE: CPT | Performed by: PATHOLOGY

## 2025-08-26 PROCEDURE — 84153 ASSAY OF PSA TOTAL: CPT | Performed by: PATHOLOGY

## 2025-09-03 ENCOUNTER — OFFICE VISIT (OUTPATIENT)
Dept: UROLOGY | Facility: CLINIC | Age: 75
End: 2025-09-03
Payer: COMMERCIAL

## 2025-09-03 VITALS — DIASTOLIC BLOOD PRESSURE: 88 MMHG | HEART RATE: 78 BPM | SYSTOLIC BLOOD PRESSURE: 164 MMHG | OXYGEN SATURATION: 98 %

## 2025-09-03 DIAGNOSIS — R39.11 BENIGN PROSTATIC HYPERPLASIA WITH URINARY HESITANCY: ICD-10-CM

## 2025-09-03 DIAGNOSIS — Z12.5 SCREENING FOR PROSTATE CANCER: Primary | ICD-10-CM

## 2025-09-03 DIAGNOSIS — N52.9 ERECTILE DYSFUNCTION, UNSPECIFIED ERECTILE DYSFUNCTION TYPE: ICD-10-CM

## 2025-09-03 DIAGNOSIS — N40.1 BENIGN PROSTATIC HYPERPLASIA WITH URINARY HESITANCY: ICD-10-CM

## 2025-09-03 PROCEDURE — 3077F SYST BP >= 140 MM HG: CPT

## 2025-09-03 PROCEDURE — 1125F AMNT PAIN NOTED PAIN PRSNT: CPT

## 2025-09-03 PROCEDURE — 99213 OFFICE O/P EST LOW 20 MIN: CPT

## 2025-09-03 PROCEDURE — 3079F DIAST BP 80-89 MM HG: CPT

## 2025-09-03 ASSESSMENT — PAIN SCALES - GENERAL: PAINLEVEL_OUTOF10: MODERATE PAIN (4)

## (undated) DEVICE — GOWN IMPERVIOUS 2XL BLUE

## (undated) DEVICE — SPECIMEN CONTAINER 3OZ W/FORMALIN 59901

## (undated) DEVICE — ENDO SNARE EXACTO COLD 9MM LOOP 2.4MMX230CM 00711115

## (undated) DEVICE — TUBING SUCTION MEDI-VAC 1/4"X20' N620A

## (undated) DEVICE — SOL WATER IRRIG 500ML BOTTLE 2F7113

## (undated) DEVICE — KIT ENDO TURNOVER/PROCEDURE CARRY-ON 101822

## (undated) DEVICE — SUCTION MANIFOLD NEPTUNE 2 SYS 1 PORT 702-025-000